# Patient Record
Sex: MALE | Race: ASIAN | NOT HISPANIC OR LATINO | Employment: FULL TIME | URBAN - METROPOLITAN AREA
[De-identification: names, ages, dates, MRNs, and addresses within clinical notes are randomized per-mention and may not be internally consistent; named-entity substitution may affect disease eponyms.]

---

## 2017-03-23 DIAGNOSIS — F51.01 PRIMARY INSOMNIA: ICD-10-CM

## 2017-03-23 NOTE — TELEPHONE ENCOUNTER
traZODone (DESYREL) 50 MG tablet  Last Written Prescription Date: 2/1/16  Last Fill Quantity: 90; # refills: 3  Last Office Visit with [facility], [facility] or [facility] prescribing provider:  2/1/16        Last PHQ-9 score on record= No flowsheet data found.    No results found for: AST  No results found for: ALT

## 2017-03-27 RX ORDER — TRAZODONE HYDROCHLORIDE 50 MG/1
TABLET, FILM COATED ORAL
Qty: 30 TABLET | Refills: 0 | Status: SHIPPED | OUTPATIENT
Start: 2017-03-27 | End: 2017-06-27

## 2017-03-27 NOTE — TELEPHONE ENCOUNTER
Medication is being filled for 1 time refill only due to:  Patient needs to be seen because it has been more than one year since last visit.   Please contact patient to schedule medication check.   Maame Moran RN  Triage Nurse

## 2017-04-03 ENCOUNTER — OFFICE VISIT (OUTPATIENT)
Dept: PEDIATRICS | Facility: CLINIC | Age: 37
End: 2017-04-03
Payer: COMMERCIAL

## 2017-04-03 VITALS
SYSTOLIC BLOOD PRESSURE: 102 MMHG | TEMPERATURE: 98.3 F | HEART RATE: 75 BPM | HEIGHT: 68 IN | BODY MASS INDEX: 23.64 KG/M2 | WEIGHT: 156 LBS | OXYGEN SATURATION: 97 % | DIASTOLIC BLOOD PRESSURE: 60 MMHG

## 2017-04-03 DIAGNOSIS — M79.2 NEUROPATHIC PAIN OF HAND, LEFT: ICD-10-CM

## 2017-04-03 DIAGNOSIS — F51.01 PRIMARY INSOMNIA: ICD-10-CM

## 2017-04-03 DIAGNOSIS — Z00.00 ENCOUNTER FOR ROUTINE ADULT HEALTH EXAMINATION WITHOUT ABNORMAL FINDINGS: Primary | ICD-10-CM

## 2017-04-03 DIAGNOSIS — S68.629S: ICD-10-CM

## 2017-04-03 DIAGNOSIS — N52.9 ERECTILE DYSFUNCTION, UNSPECIFIED ERECTILE DYSFUNCTION TYPE: ICD-10-CM

## 2017-04-03 PROCEDURE — 99213 OFFICE O/P EST LOW 20 MIN: CPT | Mod: 25 | Performed by: INTERNAL MEDICINE

## 2017-04-03 PROCEDURE — 99395 PREV VISIT EST AGE 18-39: CPT | Performed by: INTERNAL MEDICINE

## 2017-04-03 RX ORDER — SILDENAFIL CITRATE 20 MG/1
TABLET ORAL
Qty: 30 TABLET | Refills: 3 | Status: SHIPPED | OUTPATIENT
Start: 2017-04-03 | End: 2017-06-27

## 2017-04-03 RX ORDER — ZOLPIDEM TARTRATE 10 MG/1
TABLET ORAL
Qty: 90 TABLET | Refills: 1 | Status: SHIPPED | OUTPATIENT
Start: 2017-04-03 | End: 2017-06-27
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
## 2017-04-03 NOTE — NURSING NOTE
"Chief Complaint   Patient presents with     Physical       Initial /60 (Cuff Size: Adult Regular)  Pulse 75  Temp 98.3  F (36.8  C) (Oral)  Ht 5' 8\" (1.727 m)  Wt 156 lb (70.8 kg)  SpO2 97%  BMI 23.72 kg/m2 Estimated body mass index is 23.72 kg/(m^2) as calculated from the following:    Height as of this encounter: 5' 8\" (1.727 m).    Weight as of this encounter: 156 lb (70.8 kg).  Medication Reconciliation: isiah Cho CMA    "

## 2017-04-03 NOTE — MR AVS SNAPSHOT
After Visit Summary   4/3/2017    Edgardo Mustafa    MRN: [id number]           Patient Information     Date Of Birth          1980        Visit Information        Provider Department      4/3/2017 3:40 PM Mesfin Rosales MD [place] [facility]        Today's Diagnoses     Encounter for routine adult health examination without abnormal findings    -  1    Primary insomnia        Erectile dysfunction, unspecified erectile dysfunction type          Care Instructions      Preventive Health Recommendations  Male Ages 26 - 39    Yearly exam:             See your health care provider every year in order to  o   Review health changes.   o   Discuss preventive care.    o   Review your medicines if your doctor has prescribed any.    You should be tested each year for STDs (sexually transmitted diseases), if you re at risk.     After age 35, talk to your provider about cholesterol testing. If you are at risk for heart disease, have your cholesterol tested at least every 5 years.     If you are at risk for diabetes, you should have a diabetes test (fasting glucose).  Shots: Get a flu shot each year. Get a tetanus shot every 10 years.     Nutrition:    Eat at least 5 servings of fruits and vegetables daily.     Eat whole-grain bread, whole-wheat pasta and brown rice instead of white grains and rice.     Talk to your provider about Calcium and Vitamin D.     Lifestyle    Exercise for at least 150 minutes a week (30 minutes a day, 5 days a week). This will help you control your weight and prevent disease.     Limit alcohol to one drink per day.     No smoking.     Wear sunscreen to prevent skin cancer.     See your dentist every six months for an exam and cleaning.           Follow-ups after your visit        Who to contact     If you have questions or need follow up information about today's clinic visit or your schedule please contact [facility] [place] directly at [phone].  Normal or  "non-critical lab and imaging results will be communicated to you by MyChart, letter or phone within 4 business days after the clinic has received the results. If you do not hear from us within 7 days, please contact the clinic through [employer] or phone. If you have a critical or abnormal lab result, we will notify you by phone as soon as possible.  Submit refill requests through [employer] or call your pharmacy and they will forward the refill request to us. Please allow 3 business days for your refill to be completed.          Additional Information About Your Visit        Cytoo Information     [employer]too lets you send messages to your doctor, view your test results, renew your prescriptions, schedule appointments and more. To sign up, go to [email or web address] . Click on \"Log in\" on the left side of the screen, which will take you to the Welcome page. Then click on \"Sign up Now\" on the right side of the page.     You will be asked to enter the access code listed below, as well as some personal information. Please follow the directions to create your username and password.     Your access code is: [email or web address]  Expires: 2017  4:36 PM     Your access code will  in 90 days. If you need help or a new code, please call your [place] clinic or [phone].        Care EveryWhere ID     This is your Care EveryWhere ID. This could be used by other organizations to access your [place] medical records  [id number]        Your Vitals Were     Pulse Temperature Height Pulse Oximetry BMI (Body Mass Index)       75 98.3  F (36.8  C) (Oral) 5' 8\" (1.727 m) 97% 23.72 kg/m2        Blood Pressure from Last 3 Encounters:   17 102/60   16 (!) 138/92   16 120/82    Weight from Last 3 Encounters:   17 156 lb (70.8 kg)   16 155 lb (70.3 kg)   16 151 lb 6.4 oz (68.7 kg)              Today, you had the following     No orders found for display         Today's Medication Changes        "   These changes are accurate as of: 4/3/17  4:36 PM.  If you have any questions, ask your nurse or doctor.               Start taking these medicines.        Dose/Directions    sildenafil 20 MG tablet   Commonly known as:  REVATIO/VIAGRA   Used for:  Erectile dysfunction, unspecified erectile dysfunction type   Started by:  Mesfin Rosales MD        Take 1-3 tablets daily as needed 1-2 hours prior to activity   Quantity:  30 tablet   Refills:  3       zolpidem 10 MG tablet   Commonly known as:  AMBIEN   Used for:  Primary insomnia   Started by:  Mesfin Rosales MD        One half to one tab QHS prn   Quantity:  90 tablet   Refills:  1         Stop taking these medicines if you haven't already. Please contact your care team if you have questions.     oxyCODONE-acetaminophen 5-325 MG per tablet   Commonly known as:  PERCOCET   Stopped by:  Mesfin Rosales MD                Where to get your medicines      These medications were sent to [id number] - [place] - [address] [facility]  [address] [place] [address] [address]     Phone:  [phone]     sildenafil 20 MG tablet         Some of these will need a paper prescription and others can be bought over the counter.  Ask your nurse if you have questions.     Bring a paper prescription for each of these medications     zolpidem 10 MG tablet                Primary Care Provider Office Phone # Fax #    Mesfin Rosales -[phone]       [address] [place] [address]        Thank you!     Thank you for choosing [facility]  for your care. Our goal is always to provide you with excellent care. Hearing back from our patients is one way we can continue to improve our services. Please take a few minutes to complete the written survey that you may receive in the mail after your visit with us. Thank you!             Your Updated Medication List - Protect others  around you: Learn how to safely use, store and throw away your medicines at www.disposemymeds.org.          This list is accurate as of: 4/3/17  4:36 PM.  Always use your most recent med list.                   Brand Name Dispense Instructions for use    aluminum chloride 20 % external solution    DRYSOL    60 mL    To improve effect, cover area of application with plastic wrap,  and wash area in morning. As sweating improves, decrease use to 1-2 times weekly.       betamethasone dipropionate 0.05 % cream    DIPROSONE    45 g    Apply sparingly to affected area twice daily as needed.  Do not apply to face.       sildenafil 20 MG tablet    REVATIO/VIAGRA    30 tablet    Take 1-3 tablets daily as needed 1-2 hours prior to activity       traZODone 50 MG tablet    DESYREL    30 tablet    One half to one tab QHS prn       zolpidem 10 MG tablet    AMBIEN    90 tablet    One half to one tab QHS prn

## 2017-05-08 DIAGNOSIS — H57.052: Primary | ICD-10-CM

## 2017-05-08 RX ORDER — PILOCARPINE HYDROCHLORIDE 10 MG/ML
1 SOLUTION/ DROPS OPHTHALMIC 2 TIMES DAILY
Qty: 1 BOTTLE | Refills: 3 | Status: SHIPPED | OUTPATIENT
Start: 2017-05-08 | End: 2018-07-18

## 2017-05-08 NOTE — TELEPHONE ENCOUNTER
Patient calling to see if Dr. Rosales would prescribe his eye drops now that his eye physician has retired? Confirmed dosage. He gives verbal ok to speak to wife if necessary. Her number is: [phone] His number is [phone] but he will be in meetings most of the day.   Routing refill request to provider for review/approval because:  Medication is reported/historical  Jessie Claire RN

## 2017-06-08 ENCOUNTER — TELEPHONE (OUTPATIENT)
Dept: PEDIATRICS | Facility: CLINIC | Age: 37
End: 2017-06-08

## 2017-06-08 DIAGNOSIS — G47.00 INSOMNIA, UNSPECIFIED TYPE: Primary | ICD-10-CM

## 2017-06-08 NOTE — TELEPHONE ENCOUNTER
"Patient doesn't thinks the Ambien is working well.  Takes about one hour to fall asleep, and then doesn't feel like he sleeps well.  Feels like a \"zombie\" the next morning, and gets more and more tired as the day goes on.    Has tried Trazodone in the past and had similar symptoms.  Never had sleep study done, and would like to proceed with this now.  Order pending for sleep study, please sign if in agreement.  MITZI Tomlinson RN    "

## 2017-06-26 ENCOUNTER — OFFICE VISIT (OUTPATIENT)
Dept: SLEEP MEDICINE | Facility: CLINIC | Age: 37
End: 2017-06-26
Attending: INTERNAL MEDICINE
Payer: COMMERCIAL

## 2017-06-26 VITALS
BODY MASS INDEX: 22.73 KG/M2 | RESPIRATION RATE: 15 BRPM | HEART RATE: 77 BPM | WEIGHT: 150 LBS | DIASTOLIC BLOOD PRESSURE: 68 MMHG | OXYGEN SATURATION: 100 % | HEIGHT: 68 IN | SYSTOLIC BLOOD PRESSURE: 121 MMHG

## 2017-06-26 DIAGNOSIS — F51.04 PSYCHOPHYSIOLOGIC INSOMNIA: Primary | ICD-10-CM

## 2017-06-26 PROCEDURE — 99204 OFFICE O/P NEW MOD 45 MIN: CPT | Performed by: INTERNAL MEDICINE

## 2017-06-26 NOTE — PATIENT INSTRUCTIONS
"MY TREATMENT INFORMATION FOR INSOMNIA and SLEEP HYGIENE-  Edgardo Mustafa    MY CONTACT NUMBERS ARE:  [phone]  DOCTOR : Yosef Evans  SLEEP CENTER :  [facility]    BASELINE -- No insomnia  SHORT TERM INSOMNIA: Develops because of changes or events in life  CHRONIC INSOMNIA: Is maintained by habits and routines that develop over time.    THE SOLUTION: is to change those habits and routines to improve your sleep over long caroline.    Sleep is controlled by 2 factors:  1. Sleep drive: The longer you are awake, the more sleepy you feel. You get hungry for sleep as time passes.    2. Biological clock: This part of your brain tells you to be alert at some times of day and allows you to sleep at other times.    \"This best course: is to line up your sleep drive with your biological clock so you can fall and stay asleep more easily\".    Recommendations:  CBT-I: Stimulus control  Fill sleep logs.  Good sleep hygiene  Follow up dr. Rosales for evaluation for anxiety and the need for treatment.      General recommendations for sleep problems (Insomnia)  Allow 2-4 weeks to see results     Establish a regular sleep schedule   Go to bed at same time each night   Get up at same time each day   Avoid sleeping-in on Sunday morning   Cut down time in bed (if not asleep, get up)   Avoid trying to force yourself to sleep   Use your bed only for sleep and sex   Do not read or watch television in bed   Make the bedroom comfortable   Keep temperature in your bedroom comfortable   Keep bedroom quiet when sleeping   Consider ear plugs (silicon)   Keep bedroom dark enough   Use dark blinds or wear an eye mask if needed     Relax at bedtime   Relax each muscle group individually   Begin with your feet   Work toward your head     Deal with your worries before bedtime   Set aside a worry time earlier in the day for 15 minutes.    You may either list or journal about your concerns.    Thought stopping methods include:  1.  Acknowledge " the thought and let it go.  2.  Visual imagery.    Listen to relaxation tapes   Classical Music or Nature sounds   Imagine a tranquil scene (e.g. waterfall or beach)   Back Massage   Gentle 5-minute back rub prior to bedtime   Perform measures to make you tired at bedtime   Get regular exercise each day (6 hours before bedtime)   Take medications only as directed   Eat a light bedtime snack or warm drink   Warm milk   Warm herbal tea (non-caffeinated)     Stimulus Control   Do not lie awake for more than 15-20 minutes   Get out of bed and perform a quiet activity   Return to bed when sleepy   Repeat as many times per night as needed   No napping during day       Things to avoid   Do not exercise just before bedtime   No overstimulating activities just before bed   No competitive games before bedtime   No exciting television programs before bedtime   Avoid caffeine after lunchtime   Avoid chocolate   Avoid coffee, tea, or soda   Do not use alcohol to induce sleep (worsens Insomnia)   Do not take someone else's sleeping pills   Do not look at the clock when awakening   Do not turn on light when getting up to use bathroom     Read the book Say Good Night To Insomnia      Anxiety/rumination tools: Daytime use- https://www.inMarket.Dyn/            Evening use- https://www.youtube.com/watch?v=Kptd8vwAeZh      Good Sleep hygiene tips (American Academy of Sleep Medicine):  Maintain a regular sleep-wake routine    Go to bed at the same time. Wake up at the same time. Ideally, your schedule will remain the same (+/- 20 minutes) every night of the week.  Avoid naps if possible    Naps decrease the  Sleep Debt  that is so necessary for easy sleep onset.    Each of us needs a certain amount of sleep per 24-hour period. We need that amount, and we don t need more than that.    When we take naps, it decreases the amount of sleep that we need the next night - which may cause sleep fragmentation and difficulty initiating sleep, and  may lead to insomnia.  Don t stay in bed awake for more than 15-20 minutes. STIMULUS CONTROL.    If you find your mind racing, or worrying about not being able to sleep during the middle of the night, get out of bed, and sit in a chair in the dark. Do your mind racing in the chair until you are sleepy, then return to bed. No TV or internet or eat during these periods! That will just stimulate you more than desired.    If this happens several times during the night, that is OK. Just maintain your regular wake time, and try to avoid naps.  Don t watch TV, IPad, Phone or read in bed or eat in bed.    When you watch TV or read in bed or eat in bed, you associate the bed with wakefulness.    The bed is reserved for two things - sleep and hanky panky.  Drink caffeinated drinks with caution    The effects of caffeine may last for several hours after ingestion. Caffeine can fragment sleep, and cause difficulty initiating sleep. If you drink caffeine, use it only before noon.    Remember that soda and tea contain caffeine as well.  Avoid inappropriate substances that interfere with sleep    Cigarettes, alcohol, and over-the-counter medications may cause fragmented sleep.  Exercise regularly    Exercise before 2 pm every day. Exercise promotes continuous sleep.    Avoid rigorous exercise before bedtime. Rigorous exercise circulates endorphins into the body which may cause difficulty initiating sleep.   Have a quiet, comfortable bedroom    Set your bedroom thermostat at a comfortable temperature. Generally, a little cooler is better than a little warmer.    Turn off the TV and other extraneous noise that may disrupt sleep. Background  white noise  like a fan is OK.    If your pets awaken you, keep them outside the bedroom.    Your bedroom should be dark. Turn off bright lights.    Have a comfortable mattress.  If you are a  clock watcher  at night, hide the clock.      Have a comfortable pre-bedtime routine    A warm bath,  shower    Meditation, or quiet time    Wind-down 20 minutes prior of bedtime.            Your BMI is Body mass index is 22.81 kg/(m^2).  Weight management is a personal decision.  If you are interested in exploring weight loss strategies, the following discussion covers the approaches that may be successful. Body mass index (BMI) is one way to tell whether you are at a healthy weight, overweight, or obese. It measures your weight in relation to your height.  A BMI of 18.5 to 24.9 is in the healthy range. A person with a BMI of 25 to 29.9 is considered overweight, and someone with a BMI of 30 or greater is considered obese. More than two-thirds of American adults are considered overweight or obese.  Being overweight or obese increases the risk for further weight gain. Excess weight may lead to heart disease and diabetes.  Creating and following plans for healthy eating and physical activity may help you improve your health.  Weight control is part of healthy lifestyle and includes exercise, emotional health, and healthy eating habits. Careful eating habits lifelong are the mainstay of weight control. Though there are significant health benefits from weight loss, long-term weight loss with diet alone may be very difficult to achieve- studies show long-term success with dietary management in less than 10% of people. Attaining a healthy weight may be especially difficult to achieve in those with severe obesity. In some cases, medications, devices and surgical management might be considered.  What can you do?  If you are overweight or obese and are interested in methods for weight loss, you should discuss this with your provider.     Consider reducing daily calorie intake by 500 calories.     Keep a food journal.     Avoiding skipping meals, consider cutting portions instead.    Diet combined with exercise helps maintain muscle while optimizing fat loss. Strength training is particularly important for building and  maintaining muscle mass. Exercise helps reduce stress, increase energy, and improves fitness. Increasing exercise without diet control, however, may not burn enough calories to loose weight.       Start walking three days a week 10-20 minutes at a time    Work towards walking thirty minutes five days a week     Eventually, increase the speed of your walking for 1-2 minutes at time    In addition, we recommend that you review healthy lifestyles and methods for weight loss available through the National Institutes of Health patient information sites:  http://win.niddk.nih.gov/publications/index.htm    And look into health and wellness programs that may be available through your health insurance provider, employer, local community center, or nahid club.      Your blood pressure was checked while you were in clinic today.  Please read the guidelines below about what these numbers mean and what you should do about them.  Your systolic blood pressure is the top number.  This is the pressure when the heart is pumping.  Your diastolic blood pressure is the bottom number.  This is the pressure in between beats.  If your systolic blood pressure is less than 120 and your diastolic blood pressure is less than 80, then your blood pressure is normal. There is nothing more that you need to do about it  If your systolic blood pressure is 120-139 or your diastolic blood pressure is 80-89, your blood pressure may be higher than it should be.  You should have your blood pressure re-checked within a year by a primary care provider.  If your systolic blood pressure is 140 or greater or your diastolic blood pressure is 90 or greater, you may have high blood pressure.  High blood pressure is treatable, but if left untreated over time it can put you at risk for heart attack, stroke, or kidney failure.  You should have your blood pressure re-checked by a primary care provider within the next four weeks.
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
## 2017-06-26 NOTE — MR AVS SNAPSHOT
"              After Visit Summary   6/26/2017    Edgardo Mustafa    MRN: [id number]           Patient Information     Date Of Birth          1980        Visit Information        Provider Department      6/26/2017 9:00 AM Yosfe Evans MD [facility]        Today's Diagnoses     Insomnia, unspecified type          Care Instructions    MY TREATMENT INFORMATION FOR INSOMNIA and SLEEP HYGIENE-  Edgardo Mustafa    MY CONTACT NUMBERS ARE:  [phone]  DOCTOR : Yosef GOMEZ. Cristina  SLEEP CENTER :  [facility]    BASELINE -- No insomnia  SHORT TERM INSOMNIA: Develops because of changes or events in life  CHRONIC INSOMNIA: Is maintained by habits and routines that develop over time.    THE SOLUTION: is to change those habits and routines to improve your sleep over long caroline.    Sleep is controlled by 2 factors:  1. Sleep drive: The longer you are awake, the more sleepy you feel. You get hungry for sleep as time passes.    2. Biological clock: This part of your brain tells you to be alert at some times of day and allows you to sleep at other times.    \"This best course: is to line up your sleep drive with your biological clock so you can fall and stay asleep more easily\".    Recommendations:  CBT-I: Stimulus control  Fill sleep logs.  Good sleep hygiene  Follow up dr. Rosales for evaluation for anxiety and the need for treatment.      General recommendations for sleep problems (Insomnia)  Allow 2-4 weeks to see results     Establish a regular sleep schedule   Go to bed at same time each night   Get up at same time each day   Avoid sleeping-in on Sunday morning   Cut down time in bed (if not asleep, get up)   Avoid trying to force yourself to sleep   Use your bed only for sleep and sex   Do not read or watch television in bed   Make the bedroom comfortable   Keep temperature in your bedroom comfortable   Keep bedroom quiet when sleeping   Consider ear plugs (silicon)   Keep bedroom dark " enough   Use dark blinds or wear an eye mask if needed     Relax at bedtime   Relax each muscle group individually   Begin with your feet   Work toward your head     Deal with your worries before bedtime   Set aside a worry time earlier in the day for 15 minutes.    You may either list or journal about your concerns.    Thought stopping methods include:  1.  Acknowledge the thought and let it go.  2.  Visual imagery.    Listen to relaxation tapes   Classical Music or Nature sounds   Imagine a tranquil scene (e.g. waterfall or beach)   Back Massage   Gentle 5-minute back rub prior to bedtime   Perform measures to make you tired at bedtime   Get regular exercise each day (6 hours before bedtime)   Take medications only as directed   Eat a light bedtime snack or warm drink   Warm milk   Warm herbal tea (non-caffeinated)     Stimulus Control   Do not lie awake for more than 15-20 minutes   Get out of bed and perform a quiet activity   Return to bed when sleepy   Repeat as many times per night as needed   No napping during day       Things to avoid   Do not exercise just before bedtime   No overstimulating activities just before bed   No competitive games before bedtime   No exciting television programs before bedtime   Avoid caffeine after lunchtime   Avoid chocolate   Avoid coffee, tea, or soda   Do not use alcohol to induce sleep (worsens Insomnia)   Do not take someone else's sleeping pills   Do not look at the clock when awakening   Do not turn on light when getting up to use bathroom     Read the book Say Good Night To Insomnia      Anxiety/rumination tools: Daytime use- https://www.[employer].ABC Live/            Evening use- https://www.youtube.com/watch?v=Ttuz8guFkYr      Good Sleep hygiene tips (American Academy of Sleep Medicine):  Maintain a regular sleep-wake routine    Go to bed at the same time. Wake up at the same time. Ideally, your schedule will remain the same (+/- 20 minutes) every night of the week.  Avoid  naps if possible    Naps decrease the  Sleep Debt  that is so necessary for easy sleep onset.    Each of us needs a certain amount of sleep per 24-hour period. We need that amount, and we don t need more than that.    When we take naps, it decreases the amount of sleep that we need the next night - which may cause sleep fragmentation and difficulty initiating sleep, and may lead to insomnia.  Don t stay in bed awake for more than 15-20 minutes. STIMULUS CONTROL.    If you find your mind racing, or worrying about not being able to sleep during the middle of the night, get out of bed, and sit in a chair in the dark. Do your mind racing in the chair until you are sleepy, then return to bed. No TV or internet or eat during these periods! That will just stimulate you more than desired.    If this happens several times during the night, that is OK. Just maintain your regular wake time, and try to avoid naps.  Don t watch TV, IPad, Phone or read in bed or eat in bed.    When you watch TV or read in bed or eat in bed, you associate the bed with wakefulness.    The bed is reserved for two things - sleep and hanky panky.  Drink caffeinated drinks with caution    The effects of caffeine may last for several hours after ingestion. Caffeine can fragment sleep, and cause difficulty initiating sleep. If you drink caffeine, use it only before noon.    Remember that soda and tea contain caffeine as well.  Avoid inappropriate substances that interfere with sleep    Cigarettes, alcohol, and over-the-counter medications may cause fragmented sleep.  Exercise regularly    Exercise before 2 pm every day. Exercise promotes continuous sleep.    Avoid rigorous exercise before bedtime. Rigorous exercise circulates endorphins into the body which may cause difficulty initiating sleep.   Have a quiet, comfortable bedroom    Set your bedroom thermostat at a comfortable temperature. Generally, a little cooler is better than a little  warmer.    Turn off the TV and other extraneous noise that may disrupt sleep. Background  white noise  like a fan is OK.    If your pets awaken you, keep them outside the bedroom.    Your bedroom should be dark. Turn off bright lights.    Have a comfortable mattress.  If you are a  clock watcher  at night, hide the clock.      Have a comfortable pre-bedtime routine    A warm bath, shower    Meditation, or quiet time    Wind-down 20 minutes prior of bedtime.            Your BMI is Body mass index is 22.81 kg/(m^2).  Weight management is a personal decision.  If you are interested in exploring weight loss strategies, the following discussion covers the approaches that may be successful. Body mass index (BMI) is one way to tell whether you are at a healthy weight, overweight, or obese. It measures your weight in relation to your height.  A BMI of 18.5 to 24.9 is in the healthy range. A person with a BMI of 25 to 29.9 is considered overweight, and someone with a BMI of 30 or greater is considered obese. More than two-thirds of American adults are considered overweight or obese.  Being overweight or obese increases the risk for further weight gain. Excess weight may lead to heart disease and diabetes.  Creating and following plans for healthy eating and physical activity may help you improve your health.  Weight control is part of healthy lifestyle and includes exercise, emotional health, and healthy eating habits. Careful eating habits lifelong are the mainstay of weight control. Though there are significant health benefits from weight loss, long-term weight loss with diet alone may be very difficult to achieve- studies show long-term success with dietary management in less than 10% of people. Attaining a healthy weight may be especially difficult to achieve in those with severe obesity. In some cases, medications, devices and surgical management might be considered.  What can you do?  If you are overweight or obese and  are interested in methods for weight loss, you should discuss this with your provider.     Consider reducing daily calorie intake by 500 calories.     Keep a food journal.     Avoiding skipping meals, consider cutting portions instead.    Diet combined with exercise helps maintain muscle while optimizing fat loss. Strength training is particularly important for building and maintaining muscle mass. Exercise helps reduce stress, increase energy, and improves fitness. Increasing exercise without diet control, however, may not burn enough calories to loose weight.       Start walking three days a week 10-20 minutes at a time    Work towards walking thirty minutes five days a week     Eventually, increase the speed of your walking for 1-2 minutes at time    In addition, we recommend that you review healthy lifestyles and methods for weight loss available through the National Institutes of Health patient information sites:  http://win.niddk.nih.gov/publications/index.htm    And look into health and wellness programs that may be available through your health insurance provider, employer, local community center, or nahid club.      Your blood pressure was checked while you were in clinic today.  Please read the guidelines below about what these numbers mean and what you should do about them.  Your systolic blood pressure is the top number.  This is the pressure when the heart is pumping.  Your diastolic blood pressure is the bottom number.  This is the pressure in between beats.  If your systolic blood pressure is less than 120 and your diastolic blood pressure is less than 80, then your blood pressure is normal. There is nothing more that you need to do about it  If your systolic blood pressure is 120-139 or your diastolic blood pressure is 80-89, your blood pressure may be higher than it should be.  You should have your blood pressure re-checked within a year by a primary care provider.  If your systolic blood pressure  "is 140 or greater or your diastolic blood pressure is 90 or greater, you may have high blood pressure.  High blood pressure is treatable, but if left untreated over time it can put you at risk for heart attack, stroke, or kidney failure.  You should have your blood pressure re-checked by a primary care provider within the next four weeks.                Follow-ups after your visit        Who to contact     If you have questions or need follow up information about today's clinic visit or your schedule please contact [facility] directly at [phone].  Normal or non-critical lab and imaging results will be communicated to you by BrainSINShart, letter or phone within 4 business days after the clinic has received the results. If you do not hear from us within 7 days, please contact the clinic through Synthesiot or phone. If you have a critical or abnormal lab result, we will notify you by phone as soon as possible.  Submit refill requests through Babelgum or call your pharmacy and they will forward the refill request to us. Please allow 3 business days for your refill to be completed.          Additional Information About Your Visit        Babelgum Information     Babelgum lets you send messages to your doctor, view your test results, renew your prescriptions, schedule appointments and more. To sign up, go to [email or web address] . Click on \"Log in\" on the left side of the screen, which will take you to the Welcome page. Then click on \"Sign up Now\" on the right side of the page.     You will be asked to enter the access code listed below, as well as some personal information. Please follow the directions to create your username and password.     Your access code is: [email or web address]  Expires: 2017  4:36 PM     Your access code will  in 90 days. If you need help or a new code, please call your [place] clinic or [phone].        Care EveryWhere ID     This is your Care EveryWhere ID. This " "could be used by other organizations to access your [facility] medical records  [id number]        Your Vitals Were     Pulse Respirations Height Pulse Oximetry BMI (Body Mass Index)       77 15 1.727 m (5' 8\") 100% 22.81 kg/m2        Blood Pressure from Last 3 Encounters:   06/26/17 121/68   04/03/17 102/60   12/03/16 (!) 138/92    Weight from Last 3 Encounters:   06/26/17 68 kg (150 lb)   04/03/17 70.8 kg (156 lb)   12/03/16 70.3 kg (155 lb)              We Performed the Following     SLEEP EVALUATION & MANAGEMENT REFERRAL - ADULT        Primary Care Provider Office Phone # Fax #    Mesfin Rosales [phone]       [facility] [address] [place] [address]        Equal Access to Services     [facility] SHIVA : Hadii aad ku hadasho Soomaali, waaxda luqadaha, qaybta kaalmada adeegyada, joleen brennan hayharrison herrera . So [facility] [place] [phone].    ATENCIÓN: Si habla español, tiene a montemayor disposición servicios gratuitos de asistencia lingüística. Jani al [phone].    We comply with applicable federal civil rights laws and [place] laws. We do not discriminate on the basis of race, color, national origin, age, disability sex, sexual orientation or gender identity.            Thank you!     Thank you for choosing [place] [facility]  for your care. Our goal is always to provide you with excellent care. Hearing back from our patients is one way we can continue to improve our services. Please take a few minutes to complete the written survey that you may receive in the mail after your visit with us. Thank you!             Your Updated Medication List - Protect others around you: Learn how to safely use, store and throw away your medicines at www.disposemymeds.org.          This list is accurate as of: 6/26/17 10:04 AM.  Always use your most recent med list.                   Brand Name Dispense Instructions for use Diagnosis    aluminum chloride 20 % " external solution    DRYSOL    60 mL    To improve effect, cover area of application with plastic wrap,  and wash area in morning. As sweating improves, decrease use to 1-2 times weekly.    Generalized hyperhidrosis       betamethasone dipropionate 0.05 % cream    DIPROSONE    45 g    Apply sparingly to affected area twice daily as needed.  Do not apply to face.    Seborrheic dermatitis       OXYCODONE HCL PO      Take 5 mg by mouth daily as needed        pilocarpine 1 % ophthalmic solution    PILOCAR    1 Bottle    Place 1 drop Into the left eye 2 times daily    Adie's pupil, left       sildenafil 20 MG tablet    REVATIO/VIAGRA    30 tablet    Take 1-3 tablets daily as needed 1-2 hours prior to activity    Erectile dysfunction, unspecified erectile dysfunction type       traZODone 50 MG tablet    DESYREL    30 tablet    One half to one tab QHS prn    Primary insomnia       zolpidem 10 MG tablet    AMBIEN    90 tablet    One half to one tab QHS prn    Primary insomnia

## 2017-06-26 NOTE — NURSING NOTE
"Chief Complaint   Patient presents with     Consult     Insomnia, Ref by Dr Rosales,       Initial /68  Pulse 77  Resp 15  Ht 1.727 m (5' 8\")  Wt 68 kg (150 lb)  SpO2 100%  BMI 22.81 kg/m2 Estimated body mass index is 22.81 kg/(m^2) as calculated from the following:    Height as of this encounter: 1.727 m (5' 8\").    Weight as of this encounter: 68 kg (150 lb).  Medication Reconciliation: complete       Neck 38cm  15in  Ess 2      Morenita Zuñiga LPN/MA  "

## 2017-06-27 ENCOUNTER — OFFICE VISIT (OUTPATIENT)
Dept: PEDIATRICS | Facility: CLINIC | Age: 37
End: 2017-06-27
Payer: COMMERCIAL

## 2017-06-27 VITALS
HEART RATE: 60 BPM | SYSTOLIC BLOOD PRESSURE: 114 MMHG | WEIGHT: 150 LBS | TEMPERATURE: 98.1 F | BODY MASS INDEX: 22.81 KG/M2 | DIASTOLIC BLOOD PRESSURE: 70 MMHG

## 2017-06-27 DIAGNOSIS — G47.00 INSOMNIA, UNSPECIFIED TYPE: ICD-10-CM

## 2017-06-27 DIAGNOSIS — F41.9 ANXIETY: Primary | ICD-10-CM

## 2017-06-27 PROCEDURE — 99214 OFFICE O/P EST MOD 30 MIN: CPT | Performed by: INTERNAL MEDICINE

## 2017-06-27 RX ORDER — LORAZEPAM 0.5 MG/1
TABLET ORAL
Qty: 20 TABLET | Refills: 0 | Status: SHIPPED | OUTPATIENT
Start: 2017-06-27 | End: 2017-08-02

## 2017-06-27 RX ORDER — ESCITALOPRAM OXALATE 10 MG/1
TABLET ORAL
Qty: 30 TABLET | Refills: 1 | Status: SHIPPED | OUTPATIENT
Start: 2017-06-27 | End: 2017-08-02

## 2017-06-27 ASSESSMENT — PATIENT HEALTH QUESTIONNAIRE - PHQ9: 5. POOR APPETITE OR OVEREATING: SEVERAL DAYS

## 2017-06-27 ASSESSMENT — ANXIETY QUESTIONNAIRES
1. FEELING NERVOUS, ANXIOUS, OR ON EDGE: MORE THAN HALF THE DAYS
GAD7 TOTAL SCORE: 8
5. BEING SO RESTLESS THAT IT IS HARD TO SIT STILL: SEVERAL DAYS
6. BECOMING EASILY ANNOYED OR IRRITABLE: SEVERAL DAYS
2. NOT BEING ABLE TO STOP OR CONTROL WORRYING: SEVERAL DAYS
7. FEELING AFRAID AS IF SOMETHING AWFUL MIGHT HAPPEN: NOT AT ALL
3. WORRYING TOO MUCH ABOUT DIFFERENT THINGS: MORE THAN HALF THE DAYS
IF YOU CHECKED OFF ANY PROBLEMS ON THIS QUESTIONNAIRE, HOW DIFFICULT HAVE THESE PROBLEMS MADE IT FOR YOU TO DO YOUR WORK, TAKE CARE OF THINGS AT HOME, OR GET ALONG WITH OTHER PEOPLE: SOMEWHAT DIFFICULT

## 2017-06-27 NOTE — PATIENT INSTRUCTIONS
INSTRUCTIONS FOR TODAY:     stop ambien.    start lexapro   lorazepam as needed for insomnia   follow-up visit in 4-6 weeks    Dr Rosales

## 2017-06-27 NOTE — PROGRESS NOTES
"  SUBJECTIVE:                                                    Edgardo Mustafa is a 36 year old male who presents to clinic today for the following health issues:    Anxiety, poor sleep      Duration: on going problem    Description:  Depression: no  Anxiety: YES  Panic attacks: possible     Accompanying signs and symptoms: see PHQ-9 and MARU scores    History (similar episodes/previous evaluation):   36-year-old [employer] presents today for evaluation of ongoing sleep difficulty and concerns regarding anxiety. Patient has previously used Ambien for sleep. Complains of residual somnolence during the daytime after taking Ambien, interested in stopping the medication. Significant ongoing problems with insomnia. Recently referred back to primary care by sleep center regarding possible anxiety. Patient is unclear whether he feels anxious but does state his \"mind keeps running\" at night when he tries to sleep. 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 Does have difficult memories related to combat but has never been evaluated for PTSD. Does have a prior history of anxiety and had previously taken Paxil with some improvement. He is a bit concerned regarding the stigma around depression and anxiety /reluctant regarding medication today.    Precipitating or alleviating factors: None    Therapies tried and outcome: none      Patient Active Problem List   Diagnosis     Primary insomnia     Generalized hyperhidrosis     Adie's pupil, left     Partial traumatic transphalangeal amputation of finger, sequela (H)     Neuropathic pain of hand, left     Past Surgical History:   Procedure Laterality Date     SURGICAL PATHOLOGY EXAM      repair deviated septum       Social History   Substance Use Topics     Smoking status: Never Smoker     Smokeless tobacco: Never Used     Alcohol use Yes      Comment: rare     Family History   Problem Relation Age of Onset "     DIABETES No family hx of      Coronary Artery Disease No family hx of      Cancer - colorectal No family hx of      Prostate Cancer No family hx of      Hypertension Mother          Current Outpatient Prescriptions   Medication Sig Dispense Refill     escitalopram (LEXAPRO) 10 MG tablet One half tab daily for 2 weeks then increase to 1 tab daily 30 tablet 1     LORazepam (ATIVAN) 0.5 MG tablet 1 tab QHS prn insomnia 20 tablet 0     pilocarpine (PILOCAR) 1 % ophthalmic solution Place 1 drop Into the left eye 2 times daily 1 Bottle 3     aluminum chloride (DRYSOL) 20 % external solution To improve effect, cover area of application with plastic wrap,  and wash area in morning. As sweating improves, decrease use to 1-2 times weekly. 60 mL 6     betamethasone dipropionate (DIPROSONE) 0.05 % cream Apply sparingly to affected area twice daily as needed.  Do not apply to face. 45 g 2       ROS: The following systems have been completely reviewed and are negative except as noted in the HPI: CONSTITUTIONAL, CARDIOVASCULAR, PULMONARY, GASTROINTESTINAL, PSYCHIATRIC.     OBJECTIVE:                                                    /70 (Cuff Size: Adult Regular)  Pulse 60  Temp 98.1  F (36.7  C) (Oral)  Wt 150 lb (68 kg)  BMI 22.81 kg/m2 Body mass index is 22.81 kg/(m^2).  GENERAL:  alert,  no distress  Psychiatric: Normal affect  NECK: no tenderness, no adenopathy thyroid exam normal  RESP: lungs clear to auscultation - no rales, no rhonchi, no wheezes  CV: regular rates and rhythm, normal S1 S2, no S3 or S4 and no murmur, no click or rub   MS: extremities- no edema     ASSESSMENT/PLAN:                                                        ICD-10-CM    1. Anxiety F41.9 escitalopram (LEXAPRO) 10 MG tablet      Anxiety, possibly generalized anxiety disorder. May also be a component of PTSD related to  service. Recommended trial of Lexapro-side effects reviewed. Return for clinic follow-up 4-6 weeks or call  sooner if side effects become a problem.      2. Insomnia, unspecified type G47.00 LORazepam (ATIVAN) 0.5 MG tablet     Suspect insomnia will improve with anxiety management. Limited amount of lorazepam to use as needed-side effects reviewed. Instructed to discontinue Ambien.        25 minutes spent face-to-face with patient today with greater than 50% of that time spent in counseling and coordination of care regarding the issues above.     Mesfin Rosales MD  [facility]

## 2017-06-27 NOTE — NURSING NOTE
"Chief Complaint   Patient presents with     Anxiety       Initial /70 (Cuff Size: Adult Regular)  Pulse 60  Temp 98.1  F (36.7  C) (Oral)  Wt 150 lb (68 kg)  BMI 22.81 kg/m2 Estimated body mass index is 22.81 kg/(m^2) as calculated from the following:    Height as of 6/26/17: 5' 8\" (1.727 m).    Weight as of this encounter: 150 lb (68 kg).  Medication Reconciliation: isiah Cho CMA    "

## 2017-06-28 ASSESSMENT — ANXIETY QUESTIONNAIRES: GAD7 TOTAL SCORE: 8

## 2017-06-28 ASSESSMENT — PATIENT HEALTH QUESTIONNAIRE - PHQ9: SUM OF ALL RESPONSES TO PHQ QUESTIONS 1-9: 10

## 2017-08-02 ENCOUNTER — OFFICE VISIT (OUTPATIENT)
Dept: PEDIATRICS | Facility: CLINIC | Age: 37
End: 2017-08-02
Payer: COMMERCIAL

## 2017-08-02 VITALS
DIASTOLIC BLOOD PRESSURE: 70 MMHG | BODY MASS INDEX: 24.94 KG/M2 | HEART RATE: 64 BPM | WEIGHT: 164 LBS | TEMPERATURE: 98 F | SYSTOLIC BLOOD PRESSURE: 118 MMHG

## 2017-08-02 DIAGNOSIS — M54.9 UPPER BACK PAIN: ICD-10-CM

## 2017-08-02 DIAGNOSIS — F41.1 GAD (GENERALIZED ANXIETY DISORDER): Primary | ICD-10-CM
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
## 2017-08-02 DIAGNOSIS — G47.00 INSOMNIA, UNSPECIFIED TYPE: ICD-10-CM

## 2017-08-02 PROCEDURE — 99214 OFFICE O/P EST MOD 30 MIN: CPT | Performed by: INTERNAL MEDICINE

## 2017-08-02 RX ORDER — LORAZEPAM 0.5 MG/1
TABLET ORAL
Qty: 20 TABLET | Refills: 0 | Status: SHIPPED | OUTPATIENT
Start: 2017-08-02 | End: 2017-10-17

## 2017-08-02 RX ORDER — BUSPIRONE HYDROCHLORIDE 5 MG/1
TABLET ORAL
Qty: 150 TABLET | Refills: 1 | Status: SHIPPED | OUTPATIENT
Start: 2017-08-02 | End: 2017-08-14

## 2017-08-02 NOTE — NURSING NOTE
"Chief Complaint   Patient presents with     Anxiety       Initial /70 (Cuff Size: Adult Regular)  Pulse 64  Temp 98  F (36.7  C) (Oral)  Wt 164 lb (74.4 kg)  BMI 24.94 kg/m2 Estimated body mass index is 24.94 kg/(m^2) as calculated from the following:    Height as of 6/26/17: 5' 8\" (1.727 m).    Weight as of this encounter: 164 lb (74.4 kg).  Medication Reconciliation: isiah Cho CMA    "

## 2017-08-02 NOTE — PATIENT INSTRUCTIONS
INSTRUCTIONS FOR TODAY:     stop lexapro   start buspar and return for follow-up after 5-6 weeks   lorazepam as needed for sleep     Dr Rosales

## 2017-08-02 NOTE — MR AVS SNAPSHOT
"              After Visit Summary   8/2/2017    Edgardo Mustafa    MRN: [id number]           Patient Information     Date Of Birth          1980        Visit Information        Provider Department      8/2/2017 8:20 AM Mesfin Rosales MD [facility]        Today's Diagnoses     MARU (generalized anxiety disorder)    -  1    Insomnia, unspecified type          Care Instructions    INSTRUCTIONS FOR TODAY:     stop lexapro   start buspar and return for follow-up after 5-6 weeks   lorazepam as needed for sleep     Dr Rosales            Follow-ups after your visit        Your next 10 appointments already scheduled     Aug 22, 2017  4:00 PM CDT   Return Sleep Patient with Yosef Evans MD   [facility] ([facility] [place]    [address]  [place] [address] [phone]              Who to contact     If you have questions or need follow up information about today's clinic visit or your schedule please contact [facility] directly at [phone].  Normal or non-critical lab and imaging results will be communicated to you by MyChart, letter or phone within 4 business days after the clinic has received the results. If you do not hear from us within 7 days, please contact the clinic through [employer] or phone. If you have a critical or abnormal lab result, we will notify you by phone as soon as possible.  Submit refill requests through [employer] or call your pharmacy and they will forward the refill request to us. Please allow 3 business days for your refill to be completed.          Additional Information About Your Visit        [facility] Information     [employer] lets you send messages to your doctor, view your test results, renew your prescriptions, schedule appointments and more. To sign up, go to [email or web address] . Click on \"Log in\" on the left side of the screen, which will take you to the Welcome page. Then click " "on \"Sign up Now\" on the right side of the page.     You will be asked to enter the access code listed below, as well as some personal information. Please follow the directions to create your username and password.     Your access code is: [email or web address]  Expires: 10/31/2017  8:48 AM     Your access code will  in 90 days. If you need help or a new code, please call your [place] clinic or [phone].        Care EveryWhere ID     This is your Care EveryWhere ID. This could be used by other organizations to access your [place] medical records  [id number]        Your Vitals Were     Pulse Temperature BMI (Body Mass Index)             64 98  F (36.7  C) (Oral) 24.94 kg/m2          Blood Pressure from Last 3 Encounters:   17 118/70   17 114/70   17 121/68    Weight from Last 3 Encounters:   17 164 lb (74.4 kg)   17 150 lb (68 kg)   17 150 lb (68 kg)              Today, you had the following     No orders found for display         Today's Medication Changes          These changes are accurate as of: 17  8:48 AM.  If you have any questions, ask your nurse or doctor.               Start taking these medicines.        Dose/Directions    busPIRone 5 MG tablet   Commonly known as:  BUSPAR   Used for:  MARU (generalized anxiety disorder)   Started by:  Mesfin Rosales MD        Start at 5 mg twice daily for 3 days, then 7.5 mg (1.5 tabs) twice daily for 3 days, then 10 mg (2 tabs) twice daily for 3 days, then 12.5 mg (2.5 tabs) twice daily for 3 days, then 15 mg (3 tabs) twice daily and stay at that dose   Quantity:  150 tablet   Refills:  1         Stop taking these medicines if you haven't already. Please contact your care team if you have questions.     escitalopram 10 MG tablet   Commonly known as:  LEXAPRO   Stopped by:  Mesfin Rosales MD                Where to get your medicines      Some of these will need a paper prescription and others can be bought over the " counter.  Ask your nurse if you have questions.     Bring a paper prescription for each of these medications     busPIRone 5 MG tablet    LORazepam 0.5 MG tablet                Primary Care Provider Office Phone # Fax #    Mesfin Rosales [phone]       [facility] [address] [place] [address]        Equal Access to Services     [facility]: Hadii aad ku hadasho Soomaali, waaxda luqadaha, qaybta kaalmada adeegyada, waxay idiin hayaan adeeg khshankristy lajose . So [facility] [phone].    ATENCIÓN: Si habla español, tiene a montemayor disposición servicios gratuitos de asistencia lingüística. Llame al [phone].    We comply with applicable federal civil rights laws and [place] laws. We do not discriminate on the basis of race, color, national origin, age, disability sex, sexual orientation or gender identity.            Thank you!     Thank you for choosing [facility]  for your care. Our goal is always to provide you with excellent care. Hearing back from our patients is one way we can continue to improve our services. Please take a few minutes to complete the written survey that you may receive in the mail after your visit with us. Thank you!             Your Updated Medication List - Protect others around you: Learn how to safely use, store and throw away your medicines at www.disposemymeds.org.          This list is accurate as of: 8/2/17  8:48 AM.  Always use your most recent med list.                   Brand Name Dispense Instructions for use Diagnosis    aluminum chloride 20 % external solution    DRYSOL    60 mL    To improve effect, cover area of application with plastic wrap,  and wash area in morning. As sweating improves, decrease use to 1-2 times weekly.    Generalized hyperhidrosis       betamethasone dipropionate 0.05 % cream    DIPROSONE    45 g    Apply sparingly to affected area twice daily as needed.  Do not apply to face.    Seborrheic  dermatitis       busPIRone 5 MG tablet    BUSPAR    150 tablet    Start at 5 mg twice daily for 3 days, then 7.5 mg (1.5 tabs) twice daily for 3 days, then 10 mg (2 tabs) twice daily for 3 days, then 12.5 mg (2.5 tabs) twice daily for 3 days, then 15 mg (3 tabs) twice daily and stay at that dose    MARU (generalized anxiety disorder)       LORazepam 0.5 MG tablet    ATIVAN    20 tablet    1 tab QHS prn insomnia    Insomnia, unspecified type       pilocarpine 1 % ophthalmic solution    PILOCAR    1 Bottle    Place 1 drop Into the left eye 2 times daily    Gregg's pupil, left

## 2017-08-02 NOTE — PROGRESS NOTES
SUBJECTIVE:                                                    Edgardo Mustafa is a 36 year old male who presents to clinic today for the following health issues:    MARU Follow-Up    Status since last visit: Follow-up Lexapro.  Anxiety has improved.  However patient complains of loose stool and recent weight gain also some persistent disruption in sleep pattern.  Has prior history of insomnia.  Continues to have a true panic attacks    Other associated symptoms:None    Complicating factors:   Significant life event: No   Current substance abuse: None  Depression symptoms: No  MARU-7 SCORE 6/27/2017   Total Score 8     Insomnia, unspecified type     Chronic insomnia, has tried lorazepam since most  Recent visit with some improvement.    Did not tolerate Ambien    Upper back pain    Additional concerns regarding pain across upper back, has had intermittent problems with upper back pain for more than 10 years.  Symptoms have been more of a problem over the past few weeks.  Denies numbness paresthesias or radicular symptoms.  Denies recent injury.    Insect bite, initial encounter     Questions about a mosquito bite left axilla-region is pruritic.      Amount of exercise or physical activity: 2-3 days/week for an average of 45-60 minutes    Problems taking medications regularly: No    Medication side effects: none  Diet: regular (no restrictions)      Patient Active Problem List   Diagnosis     Primary insomnia     Generalized hyperhidrosis     Adie's pupil, left     Partial traumatic transphalangeal amputation of finger, sequela (H)     Neuropathic pain of hand, left     MARU (generalized anxiety disorder)     Past Surgical History:   Procedure Laterality Date     SURGICAL PATHOLOGY EXAM      repair deviated septum       Social History   Substance Use Topics     Smoking status: Never Smoker     Smokeless tobacco: Never Used     Alcohol use Yes      Comment: rare     Family History   Problem Relation Age of Onset      DIABETES No family hx of      Coronary Artery Disease No family hx of      Cancer - colorectal No family hx of      Prostate Cancer No family hx of      Hypertension Mother          Current Outpatient Prescriptions   Medication Sig Dispense Refill     LORazepam (ATIVAN) 0.5 MG tablet 1 tab QHS prn insomnia 20 tablet 0     lexapro        pilocarpine (PILOCAR) 1 % ophthalmic solution Place 1 drop Into the left eye 2 times daily 1 Bottle 3     aluminum chloride (DRYSOL) 20 % external solution To improve effect, cover area of application with plastic wrap,  and wash area in morning. As sweating improves, decrease use to 1-2 times weekly. 60 mL 6     betamethasone dipropionate (DIPROSONE) 0.05 % cream Apply sparingly to affected area twice daily as needed.  Do not apply to face. 45 g 2       ROS: The following systems have been completely reviewed and are negative except as noted in the HPI: CONSTITUTIONAL, CARDIOVASCULAR, PULMONARY, NEUROLOGIC    OBJECTIVE:                                                    /70 (Cuff Size: Adult Regular)  Pulse 64  Temp 98  F (36.7  C) (Oral)  Wt 164 lb (74.4 kg)  BMI 24.94 kg/m2 Body mass index is 24.94 kg/(m^2).  GENERAL:  alert,  no distress\  Psych: normal affect  NECK: no tenderness, no adenopathy  RESP: lungs clear to auscultation - no rales, no rhonchi, no wheezes  CV: regular rates and rhythm, normal S1 S2  Back:   Tenderness to palpation over  Thoracic paraspinous muscles without midline thoracic spinous tenderness.  Motor exam upper and lower extremities, DTRs normal  MS: extremities- no edema  Derm: 2cm  erythematous well-demarcated rash left axilla-nontender without fluctuance     ASSESSMENT/PLAN:                                                        ICD-10-CM    1. MARU (generalized anxiety disorder) F41.1 busPIRone (BUSPAR) 5 MG tablet       Discontinue Lexapro secondary to side effects.  Start trial of buspirone-side effects reviewed       Follow-up 5-6  weeks     2. Insomnia, unspecified type G47.00 LORazepam (ATIVAN) 0.5 MG tablet       Chronic insomnia, MARU contributing to ongoing issues with  Insomnia.  Continue lorazepam as needed/ short-term use     3. Upper back pain M54.9 [facility] PT, HAND, AND CHIROPRACTIC REFERRAL        Chronic intermittent upper back pain-recommended course of physical therapy     4. Insect bite, initial encounter W57.XXXA  no evidence of secondary infection, symptomatic cares/follow-up prn        Mesfin Rosales MD  [facility] [place]

## 2017-08-14 ENCOUNTER — TELEPHONE (OUTPATIENT)
Dept: PEDIATRICS | Facility: CLINIC | Age: 37
End: 2017-08-14

## 2017-08-14 NOTE — TELEPHONE ENCOUNTER
"Patient calling because he is experiencing side effects from Buspar.  Initially noted dizziness.  As he increased the dose, started feeling ill-like extreme dehydration (extremely dizzy and nauseous).  Also feeling angry.  Sunday decreased the dose again, and symptoms were not as extreme.  Is getting more \"ups and downs with everyday emotions.\"  Patient was at 12.5 mg, and then yesterday decreased to 5 mg.  Today tried 7.5 mg.  Patient states that Dr. Rosales thought this may help him sleep, but it hasn't helped with sleep issues either.  Patient asking if there may be an alternative medication?  MITZI Tomlinson RN    "

## 2017-08-14 NOTE — TELEPHONE ENCOUNTER
LM with this recommendation.  Advised to call with any other questions or concerns.  MITZI Tomlinson RN

## 2017-08-14 NOTE — TELEPHONE ENCOUNTER
Need more information--please call.  pt can stop buspar   Please recommend telephone visit to discuss other options to manage his anxiety  (consider zoloft, paxil)

## 2017-08-17 ENCOUNTER — VIRTUAL VISIT (OUTPATIENT)
Dept: PEDIATRICS | Facility: CLINIC | Age: 37
End: 2017-08-17
Payer: COMMERCIAL

## 2017-08-17 DIAGNOSIS — F51.01 PRIMARY INSOMNIA: ICD-10-CM

## 2017-08-17 DIAGNOSIS — F41.1 GAD (GENERALIZED ANXIETY DISORDER): Primary | ICD-10-CM

## 2017-08-17 PROCEDURE — 99442 ZZC PHYSICIAN TELEPHONE EVALUATION 11-20 MIN: CPT | Performed by: INTERNAL MEDICINE

## 2017-08-17 RX ORDER — FLUOXETINE 10 MG/1
10 CAPSULE ORAL DAILY
Qty: 30 CAPSULE | Refills: 1 | Status: SHIPPED | OUTPATIENT
Start: 2017-08-17 | End: 2017-10-17

## 2017-08-17 NOTE — PROGRESS NOTES
"SUBJECTIVE:  Edgardo Mustafa is a 36 year old who presents for telephone encounter.  recent nursing notes:    \"...Patient calling because he is experiencing side effects from Buspar.  Initially noted dizziness.  As he increased the dose, started feeling ill- (extremely dizzy and nauseous).  Also feeling angry.  Sunday decreased the dose again, and symptoms were not as extreme.  Is getting more \"ups and downs with everyday emotions.\"  Patient states that Dr. Rosales thought this may help him sleep, but it hasn't helped with sleep issues either...\"    Also resumed ambien for sleep which has helped  Doesn't think ativan is helpful.   Anxiety persists  lexapro stopped due to side effects.    OBJECTIVE:  Deferred    ASSESSMENT:      ICD-10-CM    1. MARU (generalized anxiety disorder) F41.1 FLUoxetine (PROZAC) 10 MG capsule      D/c buspar.  Start fluoxetine--Medication side effects discussed. rtc 4-6 weeks for follow-up\     2. Primary insomnia F51.01 Resume ambien as needed      Mesfin Rosales MD   15 minutes spent with patient by phone today with greater than 50% of that time spent in counseling and coordination of care regarding the issues above.   "

## 2017-08-17 NOTE — MR AVS SNAPSHOT
"              After Visit Summary   8/17/2017    Edagrdo Mustafa    MRN: [id number]           Patient Information     Date Of Birth          1980        Visit Information        Provider Department      8/17/2017 9:20 AM Mesfin Rosales MD [facility]        Today's Diagnoses     MARU (generalized anxiety disorder)    -  1    Primary insomnia           Follow-ups after your visit        Follow-up notes from your care team     Return in about 4 weeks (around 9/14/2017).      Your next 10 appointments already scheduled     Aug 22, 2017  4:00 PM CDT   Return Sleep Patient with Yosef Evans MD   [facility]    [address]  [place] [address] [phone]              Who to contact     If you have questions or need follow up information about today's clinic visit or your schedule please contact [facility] directly at [phone].  Normal or non-critical lab and imaging results will be communicated to you by MyChart, letter or phone within 4 business days after the clinic has received the results. If you do not hear from us within 7 days, please contact the clinic through MyPublisher[employer] or phone. If you have a critical or abnormal lab result, we will notify you by phone as soon as possible.  Submit refill requests through [employer] or call your pharmacy and they will forward the refill request to us. Please allow 3 business days for your refill to be completed.          Additional Information About Your Visit        [employer] Information     [employer] lets you send messages to your doctor, view your test results, renew your prescriptions, schedule appointments and more. To sign up, go to [email or web address] . Click on \"Log in\" on the left side of the screen, which will take you to the Welcome page. Then click on \"Sign up Now\" on the right side of the page.     You will be asked to enter the access " code listed below, as well as some personal information. Please follow the directions to create your username and password.     Your access code is: [email or web address]  Expires: 10/31/2017  8:48 AM     Your access code will  in 90 days. If you need help or a new code, please call your [place] clinic or [phone].        Care EveryWhere ID     This is your Care EveryWhere ID. This could be used by other organizations to access your [place] medical records  [id number]         Blood Pressure from Last 3 Encounters:   17 118/70   17 114/70   17 121/68    Weight from Last 3 Encounters:   17 164 lb (74.4 kg)   17 150 lb (68 kg)   17 150 lb (68 kg)              Today, you had the following     No orders found for display         Today's Medication Changes          These changes are accurate as of: 17 11:08 AM.  If you have any questions, ask your nurse or doctor.               Start taking these medicines.        Dose/Directions    FLUoxetine 10 MG capsule   Commonly known as:  PROzac   Used for:  MARU (generalized anxiety disorder)        Dose:  10 mg   Take 1 capsule (10 mg) by mouth daily   Quantity:  30 capsule   Refills:  1            Where to get your medicines      [facility] [address]  [place], [place] [address] AT [address]   [place], [place] [address]     Phone:  [phone]     FLUoxetine 10 MG capsule                Primary Care Provider Office Phone # Fax #    Mesfin Rosales -[phone]       [facility] [place] [address]        Equal Access to Services     [facility]: Hadii albino millan Soesteban, waaxda luqadaha, qaybta kaalmada enrike, joleen garcia. So [facility] [phone].    ATENCIÓN: Si habla español, tiene a montemayor disposición servicios gratuitos de asistencia lingüística. Llame al [phone].    We comply with applicable federal  civil rights laws and [place] laws. We do not discriminate on the basis of race, color, national origin, age, disability sex, sexual orientation or gender identity.            Thank you!     Thank you for choosing [facility] [place]  for your care. Our goal is always to provide you with excellent care. Hearing back from our patients is one way we can continue to improve our services. Please take a few minutes to complete the written survey that you may receive in the mail after your visit with us. Thank you!             Your Updated Medication List - Protect others around you: Learn how to safely use, store and throw away your medicines at www.disposemymeds.org.          This list is accurate as of: 8/17/17 11:08 AM.  Always use your most recent med list.                   Brand Name Dispense Instructions for use Diagnosis    aluminum chloride 20 % external solution    DRYSOL    60 mL    To improve effect, cover area of application with plastic wrap,  and wash area in morning. As sweating improves, decrease use to 1-2 times weekly.    Generalized hyperhidrosis       betamethasone dipropionate 0.05 % cream    DIPROSONE    45 g    Apply sparingly to affected area twice daily as needed.  Do not apply to face.    Seborrheic dermatitis       FLUoxetine 10 MG capsule    PROzac    30 capsule    Take 1 capsule (10 mg) by mouth daily    MARU (generalized anxiety disorder)       LORazepam 0.5 MG tablet    ATIVAN    20 tablet    1 tab QHS prn insomnia    Insomnia, unspecified type       pilocarpine 1 % ophthalmic solution    PILOCAR    1 Bottle    Place 1 drop Into the left eye 2 times daily    Treverie's pupil, left

## 2017-08-22 ENCOUNTER — OFFICE VISIT (OUTPATIENT)
Dept: SLEEP MEDICINE | Facility: CLINIC | Age: 37
End: 2017-08-22
Payer: COMMERCIAL

## 2017-08-22 VITALS
DIASTOLIC BLOOD PRESSURE: 74 MMHG | WEIGHT: 159 LBS | HEIGHT: 68 IN | BODY MASS INDEX: 24.1 KG/M2 | OXYGEN SATURATION: 100 % | HEART RATE: 60 BPM | SYSTOLIC BLOOD PRESSURE: 115 MMHG | RESPIRATION RATE: 10 BRPM

## 2017-08-22 DIAGNOSIS — F51.12 INSUFFICIENT SLEEP SYNDROME: ICD-10-CM

## 2017-08-22 DIAGNOSIS — F51.04 CHRONIC INSOMNIA: Primary | ICD-10-CM

## 2017-08-22 DIAGNOSIS — G47.21 DELAYED SLEEP PHASE SYNDROME: ICD-10-CM

## 2017-08-22 PROCEDURE — 99214 OFFICE O/P EST MOD 30 MIN: CPT | Performed by: INTERNAL MEDICINE
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
## 2017-08-22 NOTE — NURSING NOTE
"Chief Complaint   Patient presents with     RECHECK     f/u sleep logs       Initial /74  Pulse 60  Resp 10  Ht 1.727 m (5' 7.99\")  Wt 72.1 kg (159 lb)  SpO2 100%  BMI 24.18 kg/m2 Estimated body mass index is 24.18 kg/(m^2) as calculated from the following:    Height as of this encounter: 1.727 m (5' 7.99\").    Weight as of this encounter: 72.1 kg (159 lb).  Medication Reconciliation: complete     Zakiya Mcintyre CNA, Sleep Clinic Specialist  "

## 2017-08-22 NOTE — PATIENT INSTRUCTIONS
"MY TREATMENT INFORMATION FOR INSOMNIA and SLEEP HYGIENE-  Edgardo Mustafa    MY CONTACT NUMBERS ARE:  [phone]  DOCTOR : Yosef Evans  SLEEP CENTER :  [facility]    BASELINE -- No insomnia  SHORT TERM INSOMNIA: Develops because of changes or events in life  CHRONIC INSOMNIA: Is maintained by habits and routines that develop over time.    THE SOLUTION: is to change those habits and routines to improve your sleep over long caroline.    Sleep is controlled by 2 factors:  1. Sleep drive: The longer you are awake, the more sleepy you feel. You get hungry for sleep as time passes.    2. Biological clock: This part of your brain tells you to be alert at some times of day and allows you to sleep at other times.    \"This best course: is to line up your sleep drive with your biological clock so you can fall and stay asleep more easily\".    Recommendations:  Stimulus control  Fill sleep logs and actigraphy to be given for 2 weeks.  Good sleep hygiene      General recommendations for sleep problems (Insomnia)  Allow 2-4 weeks to see results     Establish a regular sleep schedule   Go to bed at same time each night   Get up at same time each day   Avoid sleeping-in on Sunday morning   Cut down time in bed (if not asleep, get up)   Avoid trying to force yourself to sleep   Use your bed only for sleep and sex   Do not read or watch television in bed   Make the bedroom comfortable   Keep temperature in your bedroom comfortable   Keep bedroom quiet when sleeping   Consider ear plugs (silicon)   Keep bedroom dark enough   Use dark blinds or wear an eye mask if needed     Relax at bedtime   Relax each muscle group individually   Begin with your feet   Work toward your head     Deal with your worries before bedtime   Set aside a worry time earlier in the day for 15 minutes.    You may either list or journal about your concerns.    Thought stopping methods include:  1.  Acknowledge the thought and let it go.  2.  Visual " imagery.    Listen to relaxation tapes   Classical Music or Nature sounds   Imagine a tranquil scene (e.g. waterfall or beach)   Back Massage   Gentle 5-minute back rub prior to bedtime   Perform measures to make you tired at bedtime   Get regular exercise each day (6 hours before bedtime)   Take medications only as directed   Eat a light bedtime snack or warm drink   Warm milk   Warm herbal tea (non-caffeinated)     Stimulus Control   Do not lie awake for more than 15-20 minutes   Get out of bed and perform a quiet activity   Return to bed when sleepy   Repeat as many times per night as needed   No napping during day       Things to avoid   Do not exercise just before bedtime   No overstimulating activities just before bed   No competitive games before bedtime   No exciting television programs before bedtime   Avoid caffeine after lunchtime   Avoid chocolate   Avoid coffee, tea, or soda   Do not use alcohol to induce sleep (worsens Insomnia)   Do not take someone else's sleeping pills   Do not look at the clock when awakening   Do not turn on light when getting up to use bathroom     Read the book Say Good Night To Insomnia      Anxiety/rumination tools: Daytime use- https://www.Inside.WOWash/            Evening use- https://www.youAria Analyticsube.com/watch?v=Qqpt7bzYoGu      Good Sleep hygiene tips (American Academy of Sleep Medicine):  Maintain a regular sleep-wake routine    Go to bed at the same time. Wake up at the same time. Ideally, your schedule will remain the same (+/- 20 minutes) every night of the week.  Avoid naps if possible    Naps decrease the  Sleep Debt  that is so necessary for easy sleep onset.    Each of us needs a certain amount of sleep per 24-hour period. We need that amount, and we don t need more than that.    When we take naps, it decreases the amount of sleep that we need the next night - which may cause sleep fragmentation and difficulty initiating sleep, and may lead to insomnia.  Don t stay in  bed awake for more than 15-20 minutes (Stimulus control)    If you find your mind racing, or worrying about not being able to sleep during the middle of the night, get out of bed, and sit in a chair in the dark. Do your mind racing in the chair until you are sleepy, then return to bed. No TV or internet or eat during these periods! That will just stimulate you more than desired.    If this happens several times during the night, that is OK. Just maintain your regular wake time, and try to avoid naps.  Don t watch TV, phone, computer, video games or read in bed or eat in bed.    When you watch TV, phone or read in bed or eat in bed, you associate the bed with wakefulness.    The bed is reserved for two things - sleep and hanky panky.  Drink caffeinated drinks with caution    The effects of caffeine may last for several hours after ingestion. Caffeine can fragment sleep, and cause difficulty initiating sleep. If you drink caffeine, use it only before noon.    Remember that soda and tea contain caffeine as well.  Avoid inappropriate substances that interfere with sleep    Cigarettes, alcohol, and over-the-counter medications may cause fragmented sleep.  Exercise regularly    Exercise promotes continuous sleep.    Avoid rigorous exercise before bedtime. Rigorous exercise circulates endorphins into the body which may cause difficulty initiating sleep.   Have a quiet, comfortable bedroom    Set your bedroom thermostat at a comfortable temperature. Generally, a little cooler is better than a little warmer.    Turn off the TV and other extraneous noise that may disrupt sleep. Background  white noise  like a fan is OK.    If your pets awaken you, keep them outside the bedroom.    Your bedroom should be dark. Turn off bright lights.    Have a comfortable mattress.  If you are a  clock watcher  at night, hide the clock.      Have a comfortable pre-bedtime routine    A warm bath, shower    Meditation, or quiet time    Wind-down  "20 minutes prior of bedtime.      Each of us has a built in tendency to find it easier to stay up late at night or get up early in the morning.  Being a  night owl  or  early bird  is a function of our internal body clock.  When you are forced to keep a sleep schedule that goes against your typical habits (because a job or other responsibilities) insomnia can be the result.     DELAYED SLEEP PHASE  What is it?   Delayed sleep phase disorder (DSP) is a circadian rhythm disorder. It consists of a typical sleep pattern that is \"delayed\" by two or more hours. This delay occurs when one s internal sleep clock (circadian rhythm) is shifted later at night and later in the morning. Once sleep occurs, the sleep is generally normal. But the delay leads to a pattern of sleep that is later than what is desired or what is considered socially acceptable. This pattern can be a problem when it interferes with work or social demands.  A person with DSP is likely to prefer late bedtimes and late wake-up times. When left to his or her own schedule, a person with DSP is likely to have a normal amount and quality of sleep. It simply occurs at a delayed time. One sign of this disorder is difficulty falling asleep until late at night. Another sign is having a hard time getting out of bed in the morning for work or school. These signs can make DSP look like insomnia. Daytime functioning can be severely impaired by DSP. It can lead to excessive sleepiness and fatigue. When able to sleep on their own schedules, people with DSP often stay up until they get tired and then sleep until they awaken late in the morning. In this case, they tend to have no complaint of difficulty falling to sleep or feeling poorly during the day.         Who gets it?   The exact rate of DSP is unknown in the general population. It is much more common in teens and young adults. From 7% to 16% of them may have it. DSP is likely to be found in 10% of people with a " "complaint of chronic insomnia. People who tend to be \"evening types\" or \"night owls\" are likely to develop DSP.     There is likely to be some genetic component. Some environmental factors may also be involved. A lack of exposure to morning sunlight may make it worse. Too much exposure to bright evening sunlight may also increase symptoms of DSP. A family history of DSP is common in about 40% of people with the disorder.     How do I know if I have it?   1. Is there a delay in your sleep pattern in relation to your desired sleep and wake times? Do you have trouble falling asleep at the desired time of night? Are you then unable to awaken at the desired or socially acceptable time?   2. When left to your own sleep schedule, do you have a normal duration and quality of sleep? Does this sleep occur in a stable, but delayed time period in relation to what is desired or socially acceptable?   3. Have you had this kind of stable but delayed sleep time for at least seven days?   If you answered  yes  to these questions, then you may have delayed sleep phase disorder.  It is also important to know if there is something else that is causing your sleep problems. They may be a result of one of the following:  Another sleep disorder   A medical condition   Medication use   A mental health disorder   Substance abuse            Do I need to see a sleep specialist?       LIGHT THERAPY  What is it?  Light therapy is a treatment used for people who suffer from circadian rhythm sleep disorders. Your body has an internal clock that tells it when it is time to be asleep and when it is time to be awake.     This clock is located in the brain just above an area where the nerves travel to the eyes. This area is called the SCN. Your clock controls the  circadian rhythms  in your body. These rhythms include body temperature, alertness and the daily cycle of many hormones.     The word  circadian  means to occur in a cycle of about 24 hours. " Circadian rhythms make you feel sleepy or alert at regular times every day. Some people have a circadian rhythm sleep disorder. This causes their natural sleep time to overlap with regular awake activities such as work or school.   Among other factors, your clock is  set  by your exposure to bright light such as sunlight. Exposure to bright light or  light therapy  is one method used to treat people with a circadian rhythm sleep disorder.     The goal for treating patients who have circadian rhythm problems is to combine a healthy sleep pattern with an internal clock that is set at the right time. This will allow them to enjoy the benefits of good sleep.     Light therapy can help someone  re-set  a clock that is off. Regular sleep patterns help to keep the clock set at the new time. Light therapy is only part of a treatment plan that should be guided by a doctor who is familiar with sleep disorders.   Light therapy is used to expose your eyes to intense but safe amounts of light for a specific and regular length of time. In many places, sunlight is not available at the proper time to be used as treatment.     Artificial light may be used to affect the body clock in the same way that sunlight does. New advances continue to be made in this field. Currently, products that are used for light therapy fit into four basic groups:   1. Light Box   This is the most common tool that is used in light therapy. The box houses several tubes that produce extremely bright light. It sits on top of a table or desk and plugs into the wall.     During a treatment session, you have to keep within a certain distance of the box. Usually, you will be about 18 to 24 inches away from it. It does not require you to look directly into the light. Instead, you simply face in the direction of the box.     You are able to do other activities during the session. Ideally, you would work on papers or read something that is in the area being lit up.  This will allow the light to be received by your eyes. Your body takes in this information and uses it to regulate the rhythms that control when you sleep and when you wake.   Earlier models of light boxes put out 2,500 to 5,000 lux of light. Lux is a measure of how much light falls on your eyes. These sessions could take two or three hours. Now, many boxes produce 10,000 lux of light. This allows sessions to take as little as 15 to 30 minutes.     More than one session may be needed each day. It depends upon your body, your need, and the strength of light being used. The key is to use the light at the right time of day and for the right amount of time. This is based upon the sleep disorder you want to correct.     New models are also safer, protecting you from harmful UV rays. Some models are now focusing on a specific bandwidth of light. Light boxes can be purchased in a variety of makes and models. Some are now being made much smaller so they are easier to take with you. General prices range from $200 - $500 per light box.   2. Desk Lamp   This serves the same purpose as a light box, but it is made to look like a normal lamp. It blends in better when used in an office setting.   3. Light Visor   This is a light source that is worn on your head and hangs over your eyes. It looks much like a tennis visor. It is made so that you can move around during sessions. The strength of visor lights also varies from 3,000 to 10,000 lux.   4. Karon Simulator     These lights gradually make a dark room brighter over a set period of time. This is meant to mimic the sunrise. Some people may find that this helps them wake up in the morning. Models may also slowly dim to copy a sunset.          Who gets it?  Bright light therapy is used for people who suffer from circadian rhythm disorders. The time of day when the light is used will depend upon the disorder it is meant to correct. These disorders include the following:     Your  blood pressure was checked while you were in clinic today.  Please read the guidelines below about what these numbers mean and what you should do about them.  Your systolic blood pressure is the top number.  This is the pressure when the heart is pumping.  Your diastolic blood pressure is the bottom number.  This is the pressure in between beats.  If your systolic blood pressure is less than 120 and your diastolic blood pressure is less than 80, then your blood pressure is normal. There is nothing more that you need to do about it  If your systolic blood pressure is 120-139 or your diastolic blood pressure is 80-89, your blood pressure may be higher than it should be.  You should have your blood pressure re-checked within a year by a primary care provider.  If your systolic blood pressure is 140 or greater or your diastolic blood pressure is 90 or greater, you may have high blood pressure.  High blood pressure is treatable, but if left untreated over time it can put you at risk for heart attack, stroke, or kidney failure.  You should have your blood pressure re-checked by a primary care provider within the next four weeks.    Your BMI is Body mass index is 24.18 kg/(m^2).  Weight management is a personal decision.  If you are interested in exploring weight loss strategies, the following discussion covers the approaches that may be successful. Body mass index (BMI) is one way to tell whether you are at a healthy weight, overweight, or obese. It measures your weight in relation to your height.  A BMI of 18.5 to 24.9 is in the healthy range. A person with a BMI of 25 to 29.9 is considered overweight, and someone with a BMI of 30 or greater is considered obese. More than two-thirds of American adults are considered overweight or obese.  Being overweight or obese increases the risk for further weight gain. Excess weight may lead to heart disease and diabetes.  Creating and following plans for healthy eating and physical  activity may help you improve your health.  Weight control is part of healthy lifestyle and includes exercise, emotional health, and healthy eating habits. Careful eating habits lifelong are the mainstay of weight control. Though there are significant health benefits from weight loss, long-term weight loss with diet alone may be very difficult to achieve- studies show long-term success with dietary management in less than 10% of people. Attaining a healthy weight may be especially difficult to achieve in those with severe obesity. In some cases, medications, devices and surgical management might be considered.  What can you do?  If you are overweight or obese and are interested in methods for weight loss, you should discuss this with your provider.     Consider reducing daily calorie intake by 500 calories.     Keep a food journal.     Avoiding skipping meals, consider cutting portions instead.    Diet combined with exercise helps maintain muscle while optimizing fat loss. Strength training is particularly important for building and maintaining muscle mass. Exercise helps reduce stress, increase energy, and improves fitness. Increasing exercise without diet control, however, may not burn enough calories to loose weight.       Start walking three days a week 10-20 minutes at a time    Work towards walking thirty minutes five days a week     Eventually, increase the speed of your walking for 1-2 minutes at time    In addition, we recommend that you review healthy lifestyles and methods for weight loss available through the National Institutes of Health patient information sites:  http://win.niddk.nih.gov/publications/index.htm    And look into health and wellness programs that may be available through your health insurance provider, employer, local community center, or nahid club.

## 2017-08-22 NOTE — MR AVS SNAPSHOT
"              After Visit Summary   8/22/2017    Edgardo Mustafa    MRN: [id number]           Patient Information     Date Of Birth          1980        Visit Information        Provider Department      8/22/2017 4:00 PM Yosef Evans MD [facility] [place]        Today's Diagnoses     Chronic insomnia    -  1    Delayed sleep phase syndrome          Care Instructions    MY TREATMENT INFORMATION FOR INSOMNIA and SLEEP HYGIENE-  Edgardo Mustafa    MY CONTACT NUMBERS ARE:  [phone]  DOCTOR : Yosef Evans  SLEEP CENTER :  [place]    BASELINE -- No insomnia  SHORT TERM INSOMNIA: Develops because of changes or events in life  CHRONIC INSOMNIA: Is maintained by habits and routines that develop over time.    THE SOLUTION: is to change those habits and routines to improve your sleep over long caroline.    Sleep is controlled by 2 factors:  1. Sleep drive: The longer you are awake, the more sleepy you feel. You get hungry for sleep as time passes.    2. Biological clock: This part of your brain tells you to be alert at some times of day and allows you to sleep at other times.    \"This best course: is to line up your sleep drive with your biological clock so you can fall and stay asleep more easily\".    Recommendations:  Stimulus control  Fill sleep logs and actigraphy to be given for 2 weeks.  Good sleep hygiene      General recommendations for sleep problems (Insomnia)  Allow 2-4 weeks to see results     Establish a regular sleep schedule   Go to bed at same time each night   Get up at same time each day   Avoid sleeping-in on Sunday morning   Cut down time in bed (if not asleep, get up)   Avoid trying to force yourself to sleep   Use your bed only for sleep and sex   Do not read or watch television in bed   Make the bedroom comfortable   Keep temperature in your bedroom comfortable   Keep bedroom quiet when sleeping   Consider ear plugs (silicon)   Keep bedroom dark enough   Use " dark blinds or wear an eye mask if needed     Relax at bedtime   Relax each muscle group individually   Begin with your feet   Work toward your head     Deal with your worries before bedtime   Set aside a worry time earlier in the day for 15 minutes.    You may either list or journal about your concerns.    Thought stopping methods include:  1.  Acknowledge the thought and let it go.  2.  Visual imagery.    Listen to relaxation tapes   Classical Music or Nature sounds   Imagine a tranquil scene (e.g. waterfall or beach)   Back Massage   Gentle 5-minute back rub prior to bedtime   Perform measures to make you tired at bedtime   Get regular exercise each day (6 hours before bedtime)   Take medications only as directed   Eat a light bedtime snack or warm drink   Warm milk   Warm herbal tea (non-caffeinated)     Stimulus Control   Do not lie awake for more than 15-20 minutes   Get out of bed and perform a quiet activity   Return to bed when sleepy   Repeat as many times per night as needed   No napping during day       Things to avoid   Do not exercise just before bedtime   No overstimulating activities just before bed   No competitive games before bedtime   No exciting television programs before bedtime   Avoid caffeine after lunchtime   Avoid chocolate   Avoid coffee, tea, or soda   Do not use alcohol to induce sleep (worsens Insomnia)   Do not take someone else's sleeping pills   Do not look at the clock when awakening   Do not turn on light when getting up to use bathroom     Read the book Say Good Night To Insomnia      Anxiety/rumination tools: Daytime use- https://www.[employer]/            Evening use- https://www.youtube.com/watch?v=Visi1waAkGa      Good Sleep hygiene tips (American Academy of Sleep Medicine):  Maintain a regular sleep-wake routine    Go to bed at the same time. Wake up at the same time. Ideally, your schedule will remain the same (+/- 20 minutes) every night of the week.  Avoid naps if  possible    Naps decrease the  Sleep Debt  that is so necessary for easy sleep onset.    Each of us needs a certain amount of sleep per 24-hour period. We need that amount, and we don t need more than that.    When we take naps, it decreases the amount of sleep that we need the next night - which may cause sleep fragmentation and difficulty initiating sleep, and may lead to insomnia.  Don t stay in bed awake for more than 15-20 minutes (Stimulus control)    If you find your mind racing, or worrying about not being able to sleep during the middle of the night, get out of bed, and sit in a chair in the dark. Do your mind racing in the chair until you are sleepy, then return to bed. No TV or internet or eat during these periods! That will just stimulate you more than desired.    If this happens several times during the night, that is OK. Just maintain your regular wake time, and try to avoid naps.  Don t watch TV, phone, computer, video games or read in bed or eat in bed.    When you watch TV, phone or read in bed or eat in bed, you associate the bed with wakefulness.    The bed is reserved for two things - sleep and hanky panky.  Drink caffeinated drinks with caution    The effects of caffeine may last for several hours after ingestion. Caffeine can fragment sleep, and cause difficulty initiating sleep. If you drink caffeine, use it only before noon.    Remember that soda and tea contain caffeine as well.  Avoid inappropriate substances that interfere with sleep    Cigarettes, alcohol, and over-the-counter medications may cause fragmented sleep.  Exercise regularly    Exercise promotes continuous sleep.    Avoid rigorous exercise before bedtime. Rigorous exercise circulates endorphins into the body which may cause difficulty initiating sleep.   Have a quiet, comfortable bedroom    Set your bedroom thermostat at a comfortable temperature. Generally, a little cooler is better than a little warmer.    Turn off the TV and  "other extraneous noise that may disrupt sleep. Background  white noise  like a fan is OK.    If your pets awaken you, keep them outside the bedroom.    Your bedroom should be dark. Turn off bright lights.    Have a comfortable mattress.  If you are a  clock watcher  at night, hide the clock.      Have a comfortable pre-bedtime routine    A warm bath, shower    Meditation, or quiet time    Wind-down 20 minutes prior of bedtime.      Each of us has a built in tendency to find it easier to stay up late at night or get up early in the morning.  Being a  night owl  or  early bird  is a function of our internal body clock.  When you are forced to keep a sleep schedule that goes against your typical habits (because a job or other responsibilities) insomnia can be the result.     DELAYED SLEEP PHASE  What is it?   Delayed sleep phase disorder (DSP) is a circadian rhythm disorder. It consists of a typical sleep pattern that is \"delayed\" by two or more hours. This delay occurs when one s internal sleep clock (circadian rhythm) is shifted later at night and later in the morning. Once sleep occurs, the sleep is generally normal. But the delay leads to a pattern of sleep that is later than what is desired or what is considered socially acceptable. This pattern can be a problem when it interferes with work or social demands.  A person with DSP is likely to prefer late bedtimes and late wake-up times. When left to his or her own schedule, a person with DSP is likely to have a normal amount and quality of sleep. It simply occurs at a delayed time. One sign of this disorder is difficulty falling asleep until late at night. Another sign is having a hard time getting out of bed in the morning for work or school. These signs can make DSP look like insomnia. Daytime functioning can be severely impaired by DSP. It can lead to excessive sleepiness and fatigue. When able to sleep on their own schedules, people with DSP often stay up until " "they get tired and then sleep until they awaken late in the morning. In this case, they tend to have no complaint of difficulty falling to sleep or feeling poorly during the day.         Who gets it?   The exact rate of DSP is unknown in the general population. It is much more common in teens and young adults. From 7% to 16% of them may have it. DSP is likely to be found in 10% of people with a complaint of chronic insomnia. People who tend to be \"evening types\" or \"night owls\" are likely to develop DSP.     There is likely to be some genetic component. Some environmental factors may also be involved. A lack of exposure to morning sunlight may make it worse. Too much exposure to bright evening sunlight may also increase symptoms of DSP. A family history of DSP is common in about 40% of people with the disorder.     How do I know if I have it?   1. Is there a delay in your sleep pattern in relation to your desired sleep and wake times? Do you have trouble falling asleep at the desired time of night? Are you then unable to awaken at the desired or socially acceptable time?   2. When left to your own sleep schedule, do you have a normal duration and quality of sleep? Does this sleep occur in a stable, but delayed time period in relation to what is desired or socially acceptable?   3. Have you had this kind of stable but delayed sleep time for at least seven days?   If you answered  yes  to these questions, then you may have delayed sleep phase disorder.  It is also important to know if there is something else that is causing your sleep problems. They may be a result of one of the following:  Another sleep disorder   A medical condition   Medication use   A mental health disorder   Substance abuse            Do I need to see a sleep specialist?       LIGHT THERAPY  What is it?  Light therapy is a treatment used for people who suffer from circadian rhythm sleep disorders. Your body has an internal clock that tells it " when it is time to be asleep and when it is time to be awake.     This clock is located in the brain just above an area where the nerves travel to the eyes. This area is called the SCN. Your clock controls the  circadian rhythms  in your body. These rhythms include body temperature, alertness and the daily cycle of many hormones.     The word  circadian  means to occur in a cycle of about 24 hours. Circadian rhythms make you feel sleepy or alert at regular times every day. Some people have a circadian rhythm sleep disorder. This causes their natural sleep time to overlap with regular awake activities such as work or school.   Among other factors, your clock is  set  by your exposure to bright light such as sunlight. Exposure to bright light or  light therapy  is one method used to treat people with a circadian rhythm sleep disorder.     The goal for treating patients who have circadian rhythm problems is to combine a healthy sleep pattern with an internal clock that is set at the right time. This will allow them to enjoy the benefits of good sleep.     Light therapy can help someone  re-set  a clock that is off. Regular sleep patterns help to keep the clock set at the new time. Light therapy is only part of a treatment plan that should be guided by a doctor who is familiar with sleep disorders.   Light therapy is used to expose your eyes to intense but safe amounts of light for a specific and regular length of time. In many places, sunlight is not available at the proper time to be used as treatment.     Artificial light may be used to affect the body clock in the same way that sunlight does. New advances continue to be made in this field. Currently, products that are used for light therapy fit into four basic groups:   1. Light Box   This is the most common tool that is used in light therapy. The box houses several tubes that produce extremely bright light. It sits on top of a table or desk and plugs into the wall.      During a treatment session, you have to keep within a certain distance of the box. Usually, you will be about 18 to 24 inches away from it. It does not require you to look directly into the light. Instead, you simply face in the direction of the box.     You are able to do other activities during the session. Ideally, you would work on papers or read something that is in the area being lit up. This will allow the light to be received by your eyes. Your body takes in this information and uses it to regulate the rhythms that control when you sleep and when you wake.   Earlier models of light boxes put out 2,500 to 5,000 lux of light. Lux is a measure of how much light falls on your eyes. These sessions could take two or three hours. Now, many boxes produce 10,000 lux of light. This allows sessions to take as little as 15 to 30 minutes.     More than one session may be needed each day. It depends upon your body, your need, and the strength of light being used. The key is to use the light at the right time of day and for the right amount of time. This is based upon the sleep disorder you want to correct.     New models are also safer, protecting you from harmful UV rays. Some models are now focusing on a specific bandwidth of light. Light boxes can be purchased in a variety of makes and models. Some are now being made much smaller so they are easier to take with you. General prices range from $200 - $500 per light box.   2. Desk Lamp   This serves the same purpose as a light box, but it is made to look like a normal lamp. It blends in better when used in an office setting.   3. Light Visor   This is a light source that is worn on your head and hangs over your eyes. It looks much like a tennis visor. It is made so that you can move around during sessions. The strength of visor lights also varies from 3,000 to 10,000 lux.   4. Karno Simulator     These lights gradually make a dark room brighter over a set period of  time. This is meant to mimic the sunrise. Some people may find that this helps them wake up in the morning. Models may also slowly dim to copy a sunset.          Who gets it?  Bright light therapy is used for people who suffer from circadian rhythm disorders. The time of day when the light is used will depend upon the disorder it is meant to correct. These disorders include the following:     Your blood pressure was checked while you were in clinic today.  Please read the guidelines below about what these numbers mean and what you should do about them.  Your systolic blood pressure is the top number.  This is the pressure when the heart is pumping.  Your diastolic blood pressure is the bottom number.  This is the pressure in between beats.  If your systolic blood pressure is less than 120 and your diastolic blood pressure is less than 80, then your blood pressure is normal. There is nothing more that you need to do about it  If your systolic blood pressure is 120-139 or your diastolic blood pressure is 80-89, your blood pressure may be higher than it should be.  You should have your blood pressure re-checked within a year by a primary care provider.  If your systolic blood pressure is 140 or greater or your diastolic blood pressure is 90 or greater, you may have high blood pressure.  High blood pressure is treatable, but if left untreated over time it can put you at risk for heart attack, stroke, or kidney failure.  You should have your blood pressure re-checked by a primary care provider within the next four weeks.    Your BMI is Body mass index is 24.18 kg/(m^2).  Weight management is a personal decision.  If you are interested in exploring weight loss strategies, the following discussion covers the approaches that may be successful. Body mass index (BMI) is one way to tell whether you are at a healthy weight, overweight, or obese. It measures your weight in relation to your height.  A BMI of 18.5 to 24.9 is in  the healthy range. A person with a BMI of 25 to 29.9 is considered overweight, and someone with a BMI of 30 or greater is considered obese. More than two-thirds of American adults are considered overweight or obese.  Being overweight or obese increases the risk for further weight gain. Excess weight may lead to heart disease and diabetes.  Creating and following plans for healthy eating and physical activity may help you improve your health.  Weight control is part of healthy lifestyle and includes exercise, emotional health, and healthy eating habits. Careful eating habits lifelong are the mainstay of weight control. Though there are significant health benefits from weight loss, long-term weight loss with diet alone may be very difficult to achieve- studies show long-term success with dietary management in less than 10% of people. Attaining a healthy weight may be especially difficult to achieve in those with severe obesity. In some cases, medications, devices and surgical management might be considered.  What can you do?  If you are overweight or obese and are interested in methods for weight loss, you should discuss this with your provider.     Consider reducing daily calorie intake by 500 calories.     Keep a food journal.     Avoiding skipping meals, consider cutting portions instead.    Diet combined with exercise helps maintain muscle while optimizing fat loss. Strength training is particularly important for building and maintaining muscle mass. Exercise helps reduce stress, increase energy, and improves fitness. Increasing exercise without diet control, however, may not burn enough calories to loose weight.       Start walking three days a week 10-20 minutes at a time    Work towards walking thirty minutes five days a week     Eventually, increase the speed of your walking for 1-2 minutes at time    In addition, we recommend that you review healthy lifestyles and methods for weight loss available through the  "National Institutes of Health patient information sites:  http://win.niddk.nih.gov/publications/index.htm    And look into health and wellness programs that may be available through your health insurance provider, employer, local community center, or nahid club.                Follow-ups after your visit        Future tests that were ordered for you today     Open Future Orders        Priority Expected Expires Ordered    Comprehensive Sleep Study Routine  2018            Who to contact     If you have questions or need follow up information about today's clinic visit or your schedule please contact [facility] directly at [phone].  Normal or non-critical lab and imaging results will be communicated to you by [employer], letter or phone within 4 business days after the clinic has received the results. If you do not hear from us within 7 days, please contact the clinic through [employer] or phone. If you have a critical or abnormal lab result, we will notify you by phone as soon as possible.  Submit refill requests through [employer] or call your pharmacy and they will forward the refill request to us. Please allow 3 business days for your refill to be completed.          Additional Information About Your Visit        MyChart Information     [employer] lets you send messages to your doctor, view your test results, renew your prescriptions, schedule appointments and more. To sign up, go to [email or web address] . Click on \"Log in\" on the left side of the screen, which will take you to the Welcome page. Then click on \"Sign up Now\" on the right side of the page.     You will be asked to enter the access code listed below, as well as some personal information. Please follow the directions to create your username and password.     Your access code is: [email or web address]  Expires: 10/31/2017  8:48 AM     Your access code will  in 90 days. If you need help or a new code, please call your " "[facility] or [phone].        Care EveryWhere ID     This is your Care EveryWhere ID. This could be used by other organizations to access your [place] medical records  [id number]        Your Vitals Were     Pulse Respirations Height Pulse Oximetry BMI (Body Mass Index)       60 10 1.727 m (5' 7.99\") 100% 24.18 kg/m2        Blood Pressure from Last 3 Encounters:   08/22/17 115/74   08/02/17 118/70   06/27/17 114/70    Weight from Last 3 Encounters:   08/22/17 72.1 kg (159 lb)   08/02/17 74.4 kg (164 lb)   06/27/17 68 kg (150 lb)               Primary Care Provider Office Phone # Fax #    Mesfin Rosales [phone] [place] [id number]        Equal Access to Services     [facility]: Hadii aad ku hadasho Soomaali, waaxda luqadaha, qaybta kaalmada adeegyada, joleen dickersonin hayharrison herrera . So [facility] [phone].    ATENCIÓN: Si habla español, tiene a montemayor disposición servicios gratuitos de asistencia lingüística. Angelinafady al [phone].    We comply with applicable federal civil rights laws and [place] laws. We do not discriminate on the basis of race, color, national origin, age, disability sex, sexual orientation or gender identity.            Thank you!     Thank you for choosing [facility]  for your care. Our goal is always to provide you with excellent care. Hearing back from our patients is one way we can continue to improve our services. Please take a few minutes to complete the written survey that you may receive in the mail after your visit with us. Thank you!             Your Updated Medication List - Protect others around you: Learn how to safely use, store and throw away your medicines at www.disposemymeds.org.          This list is accurate as of: 8/22/17  4:35 PM.  Always use your most recent med list.                   Brand Name Dispense Instructions for use Diagnosis    aluminum chloride 20 % external " solution    DRYSOL    60 mL    To improve effect, cover area of application with plastic wrap,  and wash area in morning. As sweating improves, decrease use to 1-2 times weekly.    Generalized hyperhidrosis       betamethasone dipropionate 0.05 % cream    DIPROSONE    45 g    Apply sparingly to affected area twice daily as needed.  Do not apply to face.    Seborrheic dermatitis       FLUoxetine 10 MG capsule    PROzac    30 capsule    Take 1 capsule (10 mg) by mouth daily    MARU (generalized anxiety disorder)       LORazepam 0.5 MG tablet    ATIVAN    20 tablet    1 tab QHS prn insomnia    Insomnia, unspecified type       pilocarpine 1 % ophthalmic solution    PILOCAR    1 Bottle    Place 1 drop Into the left eye 2 times daily    Gregg's pupil, left

## 2017-08-29 ENCOUNTER — OFFICE VISIT (OUTPATIENT)
Dept: SLEEP MEDICINE | Facility: CLINIC | Age: 37
End: 2017-08-29
Payer: COMMERCIAL

## 2017-08-29 DIAGNOSIS — G47.21 DELAYED SLEEP PHASE SYNDROME: Primary | ICD-10-CM

## 2017-08-29 NOTE — PROGRESS NOTES
Patient picked up actiwatch and sleep logs and was instructed on use. They showed understanding by demonstrating it back. HE will return for download and appointment on 9/14/17 @ 3:30P

## 2017-08-29 NOTE — MR AVS SNAPSHOT
"              After Visit Summary   8/29/2017    Edgardo Mustafa    MRN: [id number]           Patient Information     Date Of Birth          1980        Visit Information        Provider Department      8/29/2017 8:00 AM  SLEEP LAB [facility]        Today's Diagnoses     Delayed sleep phase syndrome    -  1       Follow-ups after your visit        Your next 10 appointments already scheduled     Sep 14, 2017  3:30 PM CDT   Return Sleep Patient with Yosef Evans MD   [facility] ([facility])    [address] [place] [id number] [phone]              Who to contact     If you have questions or need follow up information about today's clinic visit or your schedule please contact [facility] directly at [phone].  Normal or non-critical lab and imaging results will be communicated to you by MyChart, letter or phone within 4 business days after the clinic has received the results. If you do not hear from us within 7 days, please contact the clinic through MyChart or phone. If you have a critical or abnormal lab result, we will notify you by phone as soon as possible.  Submit refill requests through OpenWhere or call your pharmacy and they will forward the refill request to us. Please allow 3 business days for your refill to be completed.          Additional Information About Your Visit        MyChart Information     OpenWhere lets you send messages to your doctor, view your test results, renew your prescriptions, schedule appointments and more. To sign up, go to [email or web address] . Click on \"Log in\" on the left side of the screen, which will take you to the Welcome page. Then click on \"Sign up Now\" on the right side of the page.     You will be asked to enter the access code listed below, as well as some personal information. Please follow the directions to create your " username and password.     Your access code is: [email or web address]  Expires: 10/31/2017  8:48 AM     Your access code will  in 90 days. If you need help or a new code, please call your [place] clinic or [phone].        Care EveryWhere ID     This is your Care EveryWhere ID. This could be used by other organizations to access your [place] medical records  [id number]         Blood Pressure from Last 3 Encounters:   17 115/74   17 118/70   17 114/70    Weight from Last 3 Encounters:   17 72.1 kg (159 lb)   17 74.4 kg (164 lb)   17 68 kg (150 lb)              Today, you had the following     No orders found for display       Primary Care Provider Office Phone # Fax #    Mesfin Rosales [phone] [address] [place] [address]        Equal Access to Services     [facility]: Hadii aad ku hadasho Soomaali, waaxda luqadaha, qaybta kaalmada adeegyada, waxay idiin hayaan adeeg kharakristy la'tjn . So [facility] [phone].    ATENCIÓN: Si habla español, tiene a montemayor disposición servicios gratuitos de asistencia lingüística. Ll[facility] [phone].    We comply with applicable federal civil rights laws and [place] laws. We do not discriminate on the basis of race, color, national origin, age, disability sex, sexual orientation or gender identity.            Thank you!     Thank you for choosing [facility]  for your care. Our goal is always to provide you with excellent care. Hearing back from our patients is one way we can continue to improve our services. Please take a few minutes to complete the written survey that you may receive in the mail after your visit with us. Thank you!             Your Updated Medication List - Protect others around you: Learn how to safely use, store and throw away your medicines at www.disposemymeds.org.          This list is accurate as of: 17  8:47 AM.  Always use your most recent med  list.                   Brand Name Dispense Instructions for use Diagnosis    aluminum chloride 20 % external solution    DRYSOL    60 mL    To improve effect, cover area of application with plastic wrap,  and wash area in morning. As sweating improves, decrease use to 1-2 times weekly.    Generalized hyperhidrosis       betamethasone dipropionate 0.05 % cream    DIPROSONE    45 g    Apply sparingly to affected area twice daily as needed.  Do not apply to face.    Seborrheic dermatitis       FLUoxetine 10 MG capsule    PROzac    30 capsule    Take 1 capsule (10 mg) by mouth daily    MARU (generalized anxiety disorder)       LORazepam 0.5 MG tablet    ATIVAN    20 tablet    1 tab QHS prn insomnia    Insomnia, unspecified type       pilocarpine 1 % ophthalmic solution    PILOCAR    1 Bottle    Place 1 drop Into the left eye 2 times daily    Gregg's pupil, left

## 2017-09-14 ENCOUNTER — OFFICE VISIT (OUTPATIENT)
Dept: SLEEP MEDICINE | Facility: CLINIC | Age: 37
End: 2017-09-14
Payer: COMMERCIAL

## 2017-09-14 VITALS
OXYGEN SATURATION: 97 % | HEART RATE: 65 BPM | DIASTOLIC BLOOD PRESSURE: 74 MMHG | RESPIRATION RATE: 12 BRPM | BODY MASS INDEX: 23.04 KG/M2 | SYSTOLIC BLOOD PRESSURE: 116 MMHG | WEIGHT: 152 LBS | HEIGHT: 68 IN

## 2017-09-14 DIAGNOSIS — F51.04 PSYCHOPHYSIOLOGIC INSOMNIA: Primary | ICD-10-CM

## 2017-09-14 PROCEDURE — 95803 ACTIGRAPHY TESTING: CPT | Performed by: INTERNAL MEDICINE

## 2017-09-14 PROCEDURE — 99214 OFFICE O/P EST MOD 30 MIN: CPT | Performed by: INTERNAL MEDICINE
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
## 2017-09-14 NOTE — PATIENT INSTRUCTIONS
Your blood pressure was checked while you were in clinic today.  Please read the guidelines below about what these numbers mean and what you should do about them.  Your systolic blood pressure is the top number.  This is the pressure when the heart is pumping.  Your diastolic blood pressure is the bottom number.  This is the pressure in between beats.  If your systolic blood pressure is less than 120 and your diastolic blood pressure is less than 80, then your blood pressure is normal. There is nothing more that you need to do about it  If your systolic blood pressure is 120-139 or your diastolic blood pressure is 80-89, your blood pressure may be higher than it should be.  You should have your blood pressure re-checked within a year by a primary care provider.  If your systolic blood pressure is 140 or greater or your diastolic blood pressure is 90 or greater, you may have high blood pressure.  High blood pressure is treatable, but if left untreated over time it can put you at risk for heart attack, stroke, or kidney failure.  You should have your blood pressure re-checked by a primary care provider within the next four weeks.  Your BMI is Body mass index is 23.12 kg/(m^2).  Weight management is a personal decision.  If you are interested in exploring weight loss strategies, the following discussion covers the approaches that may be successful. Body mass index (BMI) is one way to tell whether you are at a healthy weight, overweight, or obese. It measures your weight in relation to your height.  A BMI of 18.5 to 24.9 is in the healthy range. A person with a BMI of 25 to 29.9 is considered overweight, and someone with a BMI of 30 or greater is considered obese. More than two-thirds of American adults are considered overweight or obese.  Being overweight or obese increases the risk for further weight gain. Excess weight may lead to heart disease and diabetes.  Creating and following plans for healthy eating and  physical activity may help you improve your health.  Weight control is part of healthy lifestyle and includes exercise, emotional health, and healthy eating habits. Careful eating habits lifelong are the mainstay of weight control. Though there are significant health benefits from weight loss, long-term weight loss with diet alone may be very difficult to achieve- studies show long-term success with dietary management in less than 10% of people. Attaining a healthy weight may be especially difficult to achieve in those with severe obesity. In some cases, medications, devices and surgical management might be considered.  What can you do?  If you are overweight or obese and are interested in methods for weight loss, you should discuss this with your provider.     Consider reducing daily calorie intake by 500 calories.     Keep a food journal.     Avoiding skipping meals, consider cutting portions instead.    Diet combined with exercise helps maintain muscle while optimizing fat loss. Strength training is particularly important for building and maintaining muscle mass. Exercise helps reduce stress, increase energy, and improves fitness. Increasing exercise without diet control, however, may not burn enough calories to loose weight.       Start walking three days a week 10-20 minutes at a time    Work towards walking thirty minutes five days a week     Eventually, increase the speed of your walking for 1-2 minutes at time    In addition, we recommend that you review healthy lifestyles and methods for weight loss available through the National Institutes of Health patient information sites:  http://win.niddk.nih.gov/publications/index.htm    And look into health and wellness programs that may be available through your health insurance provider, employer, local community center, or nahid club.

## 2017-09-14 NOTE — NURSING NOTE
"Chief Complaint   Patient presents with     RECHECK     d/l actiwatch, sleep diary       Initial /74  Pulse 65  Resp 12  Ht 1.727 m (5' 7.99\")  Wt 68.9 kg (152 lb)  SpO2 97%  BMI 23.12 kg/m2 Estimated body mass index is 23.12 kg/(m^2) as calculated from the following:    Height as of this encounter: 1.727 m (5' 7.99\").    Weight as of this encounter: 68.9 kg (152 lb).  Medication Reconciliation: complete     Zakiya Mcintyre CNA, Sleep Clinic Specialist    "

## 2017-09-14 NOTE — MR AVS SNAPSHOT
After Visit Summary   9/14/2017    Edgardo Mustafa    MRN: [id number]           Patient Information     Date Of Birth          1980        Visit Information        Provider Department      9/14/2017 3:30 PM Yosef Evans MD [facility]        Today's Diagnoses     Psychophysiologic insomnia    -  1      Care Instructions    Your blood pressure was checked while you were in clinic today.  Please read the guidelines below about what these numbers mean and what you should do about them.  Your systolic blood pressure is the top number.  This is the pressure when the heart is pumping.  Your diastolic blood pressure is the bottom number.  This is the pressure in between beats.  If your systolic blood pressure is less than 120 and your diastolic blood pressure is less than 80, then your blood pressure is normal. There is nothing more that you need to do about it  If your systolic blood pressure is 120-139 or your diastolic blood pressure is 80-89, your blood pressure may be higher than it should be.  You should have your blood pressure re-checked within a year by a primary care provider.  If your systolic blood pressure is 140 or greater or your diastolic blood pressure is 90 or greater, you may have high blood pressure.  High blood pressure is treatable, but if left untreated over time it can put you at risk for heart attack, stroke, or kidney failure.  You should have your blood pressure re-checked by a primary care provider within the next four weeks.  Your BMI is Body mass index is 23.12 kg/(m^2).  Weight management is a personal decision.  If you are interested in exploring weight loss strategies, the following discussion covers the approaches that may be successful. Body mass index (BMI) is one way to tell whether you are at a healthy weight, overweight, or obese. It measures your weight in relation to your height.  A BMI of 18.5 to 24.9 is in the healthy range. A  person with a BMI of 25 to 29.9 is considered overweight, and someone with a BMI of 30 or greater is considered obese. More than two-thirds of American adults are considered overweight or obese.  Being overweight or obese increases the risk for further weight gain. Excess weight may lead to heart disease and diabetes.  Creating and following plans for healthy eating and physical activity may help you improve your health.  Weight control is part of healthy lifestyle and includes exercise, emotional health, and healthy eating habits. Careful eating habits lifelong are the mainstay of weight control. Though there are significant health benefits from weight loss, long-term weight loss with diet alone may be very difficult to achieve- studies show long-term success with dietary management in less than 10% of people. Attaining a healthy weight may be especially difficult to achieve in those with severe obesity. In some cases, medications, devices and surgical management might be considered.  What can you do?  If you are overweight or obese and are interested in methods for weight loss, you should discuss this with your provider.     Consider reducing daily calorie intake by 500 calories.     Keep a food journal.     Avoiding skipping meals, consider cutting portions instead.    Diet combined with exercise helps maintain muscle while optimizing fat loss. Strength training is particularly important for building and maintaining muscle mass. Exercise helps reduce stress, increase energy, and improves fitness. Increasing exercise without diet control, however, may not burn enough calories to loose weight.       Start walking three days a week 10-20 minutes at a time    Work towards walking thirty minutes five days a week     Eventually, increase the speed of your walking for 1-2 minutes at time    In addition, we recommend that you review healthy lifestyles and methods for weight loss available through the National Institutes  "of Health patient information sites:  http://win.niddk.nih.gov/publications/index.htm    And look into health and wellness programs that may be available through your health insurance provider, employer, local community center, or [employer].                Follow-ups after your visit        Who to contact     If you have questions or need follow up information about today's clinic visit or your schedule please contact [facility] directly at [phone].  Normal or non-critical lab and imaging results will be communicated to you by MyChart, letter or phone within 4 business days after the clinic has received the results. If you do not hear from us within 7 days, please contact the clinic through BitAccesshart or phone. If you have a critical or abnormal lab result, we will notify you by phone as soon as possible.  Submit refill requests through Obsorb or call your pharmacy and they will forward the refill request to us. Please allow 3 business days for your refill to be completed.          Additional Information About Your Visit        BitAccessharTele[employer] Communications Information     Obsorb lets you send messages to your doctor, view your test results, renew your prescriptions, schedule appointments and more. To sign up, go to [email or web address] . Click on \"Log in\" on the left side of the screen, which will take you to the Welcome page. Then click on \"Sign up Now\" on the right side of the page.     You will be asked to enter the access code listed below, as well as some personal information. Please follow the directions to create your username and password.     Your access code is: [email or web address]  Expires: 10/31/2017  8:48 AM     Your access code will  in 90 days. If you need help or a new code, please call your [place] clinic or [phone].        Care EveryWhere ID     This is your Care EveryWhere ID. This could be used by other organizations to access your [place] medical records  [id number]      " "  Your Vitals Were     Pulse Respirations Height Pulse Oximetry BMI (Body Mass Index)       65 12 1.727 m (5' 7.99\") 97% 23.12 kg/m2        Blood Pressure from Last 3 Encounters:   09/14/17 116/74   08/22/17 115/74   08/02/17 118/70    Weight from Last 3 Encounters:   09/14/17 68.9 kg (152 lb)   08/22/17 72.1 kg (159 lb)   08/02/17 74.4 kg (164 lb)              Today, you had the following     No orders found for display       Primary Care Provider Office Phone # Fax #    Mesfin Rosales [phone] [address] [place] [address]        Equal Access to Services     [facility] : Hadii albino szymanski hadasho Soomaali, waaxda luqadaha, qaybta kaalmada adeegyada, waxaleksander dickersonin solomon herrera . So [facility] [phone].    ATENCIÓN: Si habla español, tiene a montemayor disposición servicios gratuitos de asistencia lingüística. Ll[facility] [phone].    We comply with applicable federal civil rights laws and [place] laws. We do not discriminate on the basis of race, color, national origin, age, disability sex, sexual orientation or gender identity.            Thank you!     Thank you for choosing [facility]  for your care. Our goal is always to provide you with excellent care. Hearing back from our patients is one way we can continue to improve our services. Please take a few minutes to complete the written survey that you may receive in the mail after your visit with us. Thank you!             Your Updated Medication List - Protect others around you: Learn how to safely use, store and throw away your medicines at www.disposemymeds.org.          This list is accurate as of: 9/14/17  4:41 PM.  Always use your most recent med list.                   Brand Name Dispense Instructions for use Diagnosis    aluminum chloride 20 % external solution    DRYSOL    60 mL    To improve effect, cover area of application with plastic wrap,  and wash area in morning. As " sweating improves, decrease use to 1-2 times weekly.    Generalized hyperhidrosis       AMBIEN PO      Take 10 mg by mouth At Bedtime        betamethasone dipropionate 0.05 % cream    DIPROSONE    45 g    Apply sparingly to affected area twice daily as needed.  Do not apply to face.    Seborrheic dermatitis       FLUoxetine 10 MG capsule    PROzac    30 capsule    Take 1 capsule (10 mg) by mouth daily    MARU (generalized anxiety disorder)       LORazepam 0.5 MG tablet    ATIVAN    20 tablet    1 tab QHS prn insomnia    Insomnia, unspecified type       pilocarpine 1 % ophthalmic solution    PILOCAR    1 Bottle    Place 1 drop Into the left eye 2 times daily    Gregg's pupil, left

## 2017-09-14 NOTE — NURSING NOTE
"Chief Complaint   Patient presents with     RECHECK     d/l actiwatch, sleep diary       Initial /74  Pulse 65  Resp 12  Ht 1.727 m (5' 7.99\")  Wt 72.1 kg (159 lb)  SpO2 97%  BMI 24.18 kg/m2 Estimated body mass index is 24.18 kg/(m^2) as calculated from the following:    Height as of this encounter: 1.727 m (5' 7.99\").    Weight as of this encounter: 72.1 kg (159 lb).  Medication Reconciliation: complete     Zakiya Mcintyre CNA, SLeep Clinic specialist  "

## 2017-09-14 NOTE — PROCEDURES
PHYSICIAN INTERPRETATION  [facility] Sleep Schedule-Actigraphy      Patient:  Edgardo Mustafa  MRN:  [id number]   Ext. ID: [id number]   YOB: 1980  Study Date:  8/29/17 to 9/14/17  Referring Physician:  Mesfin Rosales      Dates of recording: from 8/29/17 to 9/14/17                 Quality: good      Sleep diary:   correlates well    Sleep onset typically/or range at 2 minutes to 97 minutes        Sleep efficiency  78%         Total sleep time estimated is 6.15 hrs, with shortest sleep period time of 4.58 hrs and longest sleep period time of 8.09 hrs.    Sleep periods are consolidated    Light exposure periods are appropriately timed to sleep schedule    Bedtime: average  11.15 PM (range 10.10 PM to 12:50 AM)    Get up time:  Average  7.14 AM (range 6.11 AM to 8.32 AM)    Napping is not noted    Interpretation: Sleep wake pattern is consistent with       regular sleep pattern with insufficient sleep      Interpreted and electronically signed by:  Yosef Evans MD

## 2017-10-16 DIAGNOSIS — F51.01 PRIMARY INSOMNIA: ICD-10-CM

## 2017-10-16 DIAGNOSIS — F41.1 GAD (GENERALIZED ANXIETY DISORDER): ICD-10-CM

## 2017-10-16 RX ORDER — FLUOXETINE 10 MG/1
CAPSULE ORAL
Qty: 30 CAPSULE | Refills: 0 | Status: CANCELLED | OUTPATIENT
Start: 2017-10-16

## 2017-10-16 NOTE — TELEPHONE ENCOUNTER
Patient is due for follow-up appointment after beginning Fluoxetine.  Appointment scheduled tomorrow am.  Has enough medication to last until appointment.  MITZI Tomlinson RN

## 2017-10-16 NOTE — TELEPHONE ENCOUNTER
FLUoxetine (PROZAC) 10 MG capsule     Last Written Prescription Date: 8/17/2017  Last Fill Quantity: 30, # refills: 1  Last Office Visit with [facility] primary care provider:  8/17/2017   Next 5 appointments (look out 90 days)     Oct 17, 2017  7:40 AM CDT   [facility] Mes[facility]   [facility]    [address] [place] [id number] [phone]                   Last PHQ-9 score on record=   PHQ-9 SCORE 6/27/2017   Total Score 10

## 2017-10-17 ENCOUNTER — OFFICE VISIT (OUTPATIENT)
Dept: PEDIATRICS | Facility: CLINIC | Age: 37
End: 2017-10-17
Payer: COMMERCIAL

## 2017-10-17 VITALS
SYSTOLIC BLOOD PRESSURE: 100 MMHG | HEART RATE: 62 BPM | TEMPERATURE: 98.7 F | BODY MASS INDEX: 23.57 KG/M2 | WEIGHT: 155 LBS | DIASTOLIC BLOOD PRESSURE: 60 MMHG

## 2017-10-17 DIAGNOSIS — G47.00 INSOMNIA, UNSPECIFIED TYPE: ICD-10-CM

## 2017-10-17 DIAGNOSIS — F41.1 GAD (GENERALIZED ANXIETY DISORDER): Primary | ICD-10-CM

## 2017-10-17 DIAGNOSIS — R21 RASH: ICD-10-CM

## 2017-10-17 PROCEDURE — 99213 OFFICE O/P EST LOW 20 MIN: CPT | Performed by: INTERNAL MEDICINE

## 2017-10-17 RX ORDER — ZOLPIDEM TARTRATE 10 MG/1
TABLET ORAL
Qty: 30 TABLET | Refills: 0 | Status: SHIPPED | OUTPATIENT
Start: 2017-10-17 | End: 2017-11-14

## 2017-10-17 RX ORDER — FLUOXETINE 10 MG/1
10 CAPSULE ORAL DAILY
Qty: 90 CAPSULE | Refills: 3 | Status: SHIPPED | OUTPATIENT
Start: 2017-10-17 | End: 2018-10-15

## 2017-10-17 ASSESSMENT — ANXIETY QUESTIONNAIRES
7. FEELING AFRAID AS IF SOMETHING AWFUL MIGHT HAPPEN: NOT AT ALL
2. NOT BEING ABLE TO STOP OR CONTROL WORRYING: SEVERAL DAYS
GAD7 TOTAL SCORE: 6
5. BEING SO RESTLESS THAT IT IS HARD TO SIT STILL: MORE THAN HALF THE DAYS
IF YOU CHECKED OFF ANY PROBLEMS ON THIS QUESTIONNAIRE, HOW DIFFICULT HAVE THESE PROBLEMS MADE IT FOR YOU TO DO YOUR WORK, TAKE CARE OF THINGS AT HOME, OR GET ALONG WITH OTHER PEOPLE: NOT DIFFICULT AT ALL
1. FEELING NERVOUS, ANXIOUS, OR ON EDGE: SEVERAL DAYS
3. WORRYING TOO MUCH ABOUT DIFFERENT THINGS: SEVERAL DAYS
6. BECOMING EASILY ANNOYED OR IRRITABLE: NOT AT ALL

## 2017-10-17 ASSESSMENT — PATIENT HEALTH QUESTIONNAIRE - PHQ9
5. POOR APPETITE OR OVEREATING: SEVERAL DAYS
SUM OF ALL RESPONSES TO PHQ QUESTIONS 1-9: 6

## 2017-10-17 NOTE — PATIENT INSTRUCTIONS
INSTRUCTIONS FOR TODAY:     continue fluoxetine 10mg daily.   In 1 month if anxiety persists we can increase to 2 tabs daily or 20mg   ambien as needed, try to wean off if possible, can transition to melatonin 10mg each evening as needed       Dr Rosales

## 2017-10-17 NOTE — NURSING NOTE
"Chief Complaint   Patient presents with     Anxiety       Initial /60 (Cuff Size: Adult Regular)  Pulse 62  Temp 98.7  F (37.1  C) (Oral)  Wt 155 lb (70.3 kg)  BMI 23.57 kg/m2 Estimated body mass index is 23.57 kg/(m^2) as calculated from the following:    Height as of 9/14/17: 5' 7.99\" (1.727 m).    Weight as of this encounter: 155 lb (70.3 kg).  Medication Reconciliation: isiah Cho CMA    "

## 2017-10-17 NOTE — PROGRESS NOTES
SUBJECTIVE:   Edgardo Mustafa is a 37 year old male who presents to clinic today for the following health issues:    Anxiety Follow-Up    Status since last visit: Improved.  Current rx fluoxetine 10mg    Started improving after 1 month, better at 2 months--some recurrent sx in past 1-2 weeks    Other associated symptoms:   Insomnia-continues ambien prn    Complicating factors:   Significant life event: No   Current substance abuse: None  Depression symptoms: No  MARU-7 SCORE 6/27/2017 10/17/2017   Total Score 8 6         Amount of exercise or physical activity: 2-3 days/week for an average of 45-60 minutes    Problems taking medications regularly: No    Medication side effects: none  Diet: regular (no restrictions)      Patient Active Problem List   Diagnosis     Primary insomnia     Generalized hyperhidrosis     Adie's pupil, left     Partial traumatic transphalangeal amputation of finger, sequela (H)     Neuropathic pain of hand, left     MARU (generalized anxiety disorder)     Past Surgical History:   Procedure Laterality Date     SURGICAL PATHOLOGY EXAM      repair deviated septum       Social History   Substance Use Topics     Smoking status: Never Smoker     Smokeless tobacco: Never Used     Alcohol use Yes      Comment: rare     Family History   Problem Relation Age of Onset     DIABETES No family hx of      Coronary Artery Disease No family hx of      Cancer - colorectal No family hx of      Prostate Cancer No family hx of      Hypertension Mother          Current Outpatient Prescriptions   Medication Sig Dispense Refill     zolpidem (AMBIEN) 10 MG tablet One half to one tab QHS prn 30 tablet 0     FLUoxetine (PROZAC) 10 MG capsule Take 1 capsule (10 mg) by mouth daily 90 capsule 3     pilocarpine (PILOCAR) 1 % ophthalmic solution Place 1 drop Into the left eye 2 times daily 1 Bottle 3     aluminum chloride (DRYSOL) 20 % external solution To improve effect, cover area of application with plastic wrap,   and wash area in morning. As sweating improves, decrease use to 1-2 times weekly. 60 mL 6     betamethasone dipropionate (DIPROSONE) 0.05 % cream Apply sparingly to affected area twice daily as needed.  Do not apply to face. 45 g 2     OBJECTIVE:                                                    /60 (Cuff Size: Adult Regular)  Pulse 62  Temp 98.7  F (37.1  C) (Oral)  Wt 155 lb (70.3 kg)  BMI 23.57 kg/m2 Body mass index is 23.57 kg/(m^2).  GENERAL:  alert,  no distress  Psych: normal affect     ASSESSMENT/PLAN:                                                        ICD-10-CM    1. MARU (generalized anxiety disorder) F41.1 FLUoxetine (PROZAC) 10 MG capsule   2. Insomnia, unspecified type G47.00 zolpidem (AMBIEN) 10 MG tablet      Patient Instructions   INSTRUCTIONS FOR TODAY:     continue fluoxetine 10mg daily.   In 1 month if anxiety persists, pt will increase to 20mg daily   insomnia.   ambien as needed, try to wean off , can transition to melatonin 10mg each evening as needed   15 minutes spent face-to-face with patient today with greater than 50% of that time spent in counseling and coordination of care regarding the issues above.      Mesfin Rosales MD  [facility]

## 2017-10-17 NOTE — MR AVS SNAPSHOT
"              After Visit Summary   10/17/2017    Edgardo Mustafa    MRN: [id number]           Patient Information     Date Of Birth          1980        Visit Information        Provider Department      10/17/2017 7:40 AM [facility] MD [facility]        Today's Diagnoses     Insomnia, unspecified type    -  1    MARU (generalized anxiety disorder)          Care Instructions    INSTRUCTIONS FOR TODAY:     continue fluoxetine 10mg daily.   In 1 month if anxiety persists we can increase to 2 tabs daily or 20mg   ambien as needed, try to wean off if possible, can transition to melatonin 10mg each evening as needed       Dr Rosales            Follow-ups after your visit        Who to contact     If you have questions or need follow up information about today's clinic visit or your schedule please contact [facility] directly at [phone].  Normal or non-critical lab and imaging results will be communicated to you by Soundstachehart, letter or phone within 4 business days after the clinic has received the results. If you do not hear from us within 7 days, please contact the clinic through Soundstachehart or phone. If you have a critical or abnormal lab result, we will notify you by phone as soon as possible.  Submit refill requests through Jolicloud or call your pharmacy and they will forward the refill request to us. Please allow 3 business days for your refill to be completed.          Additional Information About Your Visit        MyChart Information     Jolicloud lets you send messages to your doctor, view your test results, renew your prescriptions, schedule appointments and more. To sign up, go to [email or web address] . Click on \"Log in\" on the left side of the screen, which will take you to the Welcome page. Then click on \"Sign up Now\" on the right side of the page.     You will be asked to enter the access code listed below, as well as some personal information. Please follow the " directions to create your username and password.     Your access code is: [email or web address]  Expires: 10/31/2017  8:48 AM     Your access code will  in 90 days. If you need help or a new code, please call your [facility] clinic or [phone].        Care EveryWhere ID     This is your Care EveryWhere ID. This could be used by other organizations to access your [facility] medical records  [id number]        Your Vitals Were     Pulse Temperature BMI (Body Mass Index)             62 98.7  F (37.1  C) (Oral) 23.57 kg/m2          Blood Pressure from Last 3 Encounters:   10/17/17 100/60   17 116/74   17 115/74    Weight from Last 3 Encounters:   10/17/17 155 lb (70.3 kg)   17 152 lb (68.9 kg)   17 159 lb (72.1 kg)              Today, you had the following     No orders found for display         Today's Medication Changes          These changes are accurate as of: 10/17/17  8:07 AM.  If you have any questions, ask your nurse or doctor.               These medicines have changed or have updated prescriptions.        Dose/Directions    zolpidem 10 MG tablet   Commonly known as:  AMBIEN   This may have changed:    - medication strength  - how much to take  - how to take this  - when to take this  - additional instructions   Used for:  Insomnia, unspecified type   Changed by:  Mesfin Rosales MD        One half to one tab QHS prn   Quantity:  30 tablet   Refills:  0         Stop taking these medicines if you haven't already. Please contact your care team if you have questions.     LORazepam 0.5 MG tablet   Commonly known as:  ATIVAN   Stopped by:  Mesfin Rosales MD                Where to get your medicines      These medications were sent to [facility] [id number] [facility] [address]   [place] [address] [address]     Phone:  [phone]     FLUoxetine 10 MG capsule         Some of these will need a paper prescription and others can be bought  over the counter.  Ask your nurse if you have questions.     Bring a paper prescription for each of these medications     zolpidem 10 MG tablet                Primary Care Provider Office Phone # Fax #    Mesfin Rosales [phone] [address] [place] [address]        Equal Access to Services     [facility]: Hadii aad ku hadasho Soomaali, waaxda luqadaha, qaybta kaalmada adeegyada, waxay jaylain haytjn adejody abdias sharon garcia. So [facility] [phone].    ATENCIÓN: Si habla español, tiene a montemayor disposición servicios gratuitos de asistencia lingüística. [facility] [phone].    We comply with applicable federal civil rights laws and [place] laws. We do not discriminate on the basis of race, color, national origin, age, disability, sex, sexual orientation, or gender identity.            Thank you!     Thank you for choosing [facility] [place]  for your care. Our goal is always to provide you with excellent care. Hearing back from our patients is one way we can continue to improve our services. Please take a few minutes to complete the written survey that you may receive in the mail after your visit with us. Thank you!             Your Updated Medication List - Protect others around you: Learn how to safely use, store and throw away your medicines at www.disposemymeds.org.          This list is accurate as of: 10/17/17  8:07 AM.  Always use your most recent med list.                   Brand Name Dispense Instructions for use Diagnosis    aluminum chloride 20 % external solution    DRYSOL    60 mL    To improve effect, cover area of application with plastic wrap,  and wash area in morning. As sweating improves, decrease use to 1-2 times weekly.    Generalized hyperhidrosis       betamethasone dipropionate 0.05 % cream    DIPROSONE    45 g    Apply sparingly to affected area twice daily as needed.  Do not apply to face.    Seborrheic dermatitis       FLUoxetine 10 MG capsule     PROzac    90 capsule    Take 1 capsule (10 mg) by mouth daily    MARU (generalized anxiety disorder)       pilocarpine 1 % ophthalmic solution    PILOCAR    1 Bottle    Place 1 drop Into the left eye 2 times daily    Adie's pupil, left       zolpidem 10 MG tablet    AMBIEN    30 tablet    One half to one tab QHS prn    Insomnia, unspecified type

## 2017-10-18 ASSESSMENT — ANXIETY QUESTIONNAIRES: GAD7 TOTAL SCORE: 6

## 2017-11-07 ENCOUNTER — OFFICE VISIT (OUTPATIENT)
Dept: FAMILY MEDICINE | Facility: CLINIC | Age: 37
End: 2017-11-07
Payer: COMMERCIAL

## 2017-11-07 DIAGNOSIS — L30.9 DERMATITIS: ICD-10-CM

## 2017-11-07 DIAGNOSIS — B36.0 TINEA VERSICOLOR: Primary | ICD-10-CM

## 2017-11-07 LAB
KOH PREP SPEC: ABNORMAL
SPECIMEN SOURCE: ABNORMAL

## 2017-11-07 PROCEDURE — 87220 TISSUE EXAM FOR FUNGI: CPT | Performed by: FAMILY MEDICINE

## 2017-11-07 PROCEDURE — 99214 OFFICE O/P EST MOD 30 MIN: CPT | Performed by: FAMILY MEDICINE

## 2017-11-07 RX ORDER — FLUCONAZOLE 150 MG/1
300 TABLET ORAL
Qty: 8 TABLET | Refills: 0 | Status: SHIPPED | OUTPATIENT
Start: 2017-11-07 | End: 2017-11-29
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
## 2017-11-07 NOTE — PATIENT INSTRUCTIONS
FUTURE APPOINTMENTS  Follow up as needed.    FLUCONAZOLE FOR TINEA VERSICOLOR INSTRUCTIONS  1. Take 2 capsules/tablets of fluconazole 150 mg with a carbonated beverage or orange juice. This increases the absorption by your body of the medication.    2. One hour after taking, exercise to a light sweat, as the medication works best when secreted in sweat. Do not shower for 6 hours, to allow the medication time to work.    3. Repeat this same process every 7 days for 4 weeks in total.     Note: Although the problem has been adequately treated, the skin discoloration may not resolve for several weeks.    Recurrent Cases:  It is very common for this to recur with exercise or hot weather. If this continues to be a problem, Selsun blue, ketoconazole or another shampoo may be used as a body wash once a week or so to prevent it from coming back.

## 2017-11-07 NOTE — MR AVS SNAPSHOT
After Visit Summary   11/7/2017    Edgardo Mustafa    MRN: [id number]           Patient Information     Date Of Birth          1980        Visit Information        Provider Department      11/7/2017 2:20 PM Heather Last MD [facility] - Primary Care Skin        Today's Diagnoses     Dermatitis    -  1      Care Instructions    FUTURE APPOINTMENTS  Follow up as needed.    FLUCONAZOLE FOR TINEA VERSICOLOR INSTRUCTIONS  1. Take 2 capsules/tablets of fluconazole 150 mg with a carbonated beverage or orange juice. This increases the absorption by your body of the medication.    2. One hour after taking, exercise to a light sweat, as the medication works best when secreted in sweat. Do not shower for 6 hours, to allow the medication time to work.    3. Repeat this same process every 7 days for 4 weeks in total.     Note: Although the problem has been adequately treated, the skin discoloration may not resolve for several weeks.    Recurrent Cases:  It is very common for this to recur with exercise or hot weather. If this continues to be a problem, Selsun blue, ketoconazole or another shampoo may be used as a body wash once a week or so to prevent it from coming back.          Follow-ups after your visit        Who to contact     If you have questions or need follow up information about today's clinic visit or your schedule please contact [facility] - PRIMARY CARE SKIN directly at [phone].  Normal or non-critical lab and imaging results will be communicated to you by MyChart, letter or phone within 4 business days after the clinic has received the results. If you do not hear from us within 7 days, please contact the clinic through MyChart or phone. If you have a critical or abnormal lab result, we will notify you by phone as soon as possible.  Submit refill requests through [employer] or call your pharmacy and they will forward the refill request to us. Please allow 3  "business days for your refill to be completed.          Additional Information About Your Visit        MyChart Information     [employer] lets you send messages to your doctor, view your test results, renew your prescriptions, schedule appointments and more. To sign up, go to [email or web address] . Click on \"Log in\" on the left side of the screen, which will take you to the Welcome page. Then click on \"Sign up Now\" on the right side of the page.     You will be asked to enter the access code listed below, as well as some personal information. Please follow the directions to create your username and password.     Your access code is: [email or web address]  Expires: 2018  2:53 PM     Your access code will  in 90 days. If you need help or a new code, please call your [facility] clinic or [phone].        Care EveryWhere ID     This is your Care EveryWhere ID. This could be used by other organizations to access your [facility] medical records  [id number]         Blood Pressure from Last 3 Encounters:   10/17/17 100/60   17 116/74   17 115/74    Weight from Last 3 Encounters:   10/17/17 155 lb (70.3 kg)   17 152 lb (68.9 kg)   17 159 lb (72.1 kg)              We Performed the Following     KOH skin hair or nails        Primary Care Provider Office Phone # Fax #    Mesfin Rosales [phone] [address] [place] [address]        Equal Access to Services     [facility] : Hadii albino ku hadasho Sonikolaiali, waaxda luqadaha, qaybta kaalmada adeegyavalerie, joleen garcia. So [facility] [phone].    ATENCIÓN: Si habla español, tiene a montemayor disposición servicios gratuitos de asistencia lingüística. Llame al [phone].    We comply with applicable federal civil rights laws and [place] laws. We do not discriminate on the basis of race, color, national origin, age, disability, sex, sexual orientation, or gender identity.            Thank you! "     Thank you for choosing [facility] - PRIMARY CARE SKIN  for your care. Our goal is always to provide you with excellent care. Hearing back from our patients is one way we can continue to improve our services. Please take a few minutes to complete the written survey that you may receive in the mail after your visit with us. Thank you!             Your Updated Medication List - Protect others around you: Learn how to safely use, store and throw away your medicines at www.disposemymeds.org.          This list is accurate as of: 11/7/17  2:53 PM.  Always use your most recent med list.                   Brand Name Dispense Instructions for use Diagnosis    aluminum chloride 20 % external solution    DRYSOL    60 mL    To improve effect, cover area of application with plastic wrap,  and wash area in morning. As sweating improves, decrease use to 1-2 times weekly.    Generalized hyperhidrosis       betamethasone dipropionate 0.05 % cream    DIPROSONE    45 g    Apply sparingly to affected area twice daily as needed.  Do not apply to face.    Seborrheic dermatitis       FLUoxetine 10 MG capsule    PROzac    90 capsule    Take 1 capsule (10 mg) by mouth daily    MARU (generalized anxiety disorder)       pilocarpine 1 % ophthalmic solution    PILOCAR    1 Bottle    Place 1 drop Into the left eye 2 times daily    Adie's pupil, left       zolpidem 10 MG tablet    AMBIEN    30 tablet    One half to one tab QHS prn    Insomnia, unspecified type
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
## 2017-11-14 DIAGNOSIS — F51.04 CHRONIC INSOMNIA: Primary | ICD-10-CM

## 2017-11-14 RX ORDER — ZOLPIDEM TARTRATE 10 MG/1
TABLET ORAL
Qty: 90 TABLET | Refills: 0 | Status: SHIPPED | OUTPATIENT
Start: 2017-11-14 | End: 2018-02-09

## 2017-11-14 NOTE — TELEPHONE ENCOUNTER
pts wife called back with info on faxing the script.  [facility] in the town. Telephone # [phone] she does not have the fax #   Pharmacy line # [phone]. Will have to call.         [facility] [place] [address]

## 2017-11-14 NOTE — TELEPHONE ENCOUNTER
Notified patient rx is ready but he doesn't know what pharmacy to fax it to. He is in [place] and will call with the info.  EMILIANO Cho

## 2017-11-14 NOTE — TELEPHONE ENCOUNTER
Patient calling that he had been advised to try to wean off the Ambien and he has been trying for about a month and when he tries he has difficulty falling asleep but if he doesn't he is doing fine. Wants to continue on it. Needs a refill at this time. He is going to be out of town until the end of the week but will be out of Ambien tomorrow. [phone] ok to .  He will call back with what pharmacy to send to if ok'd.   Jessie Claire, RN

## 2017-12-19 ENCOUNTER — TRANSFERRED RECORDS (OUTPATIENT)
Dept: HEALTH INFORMATION MANAGEMENT | Facility: CLINIC | Age: 37
End: 2017-12-19

## 2018-02-09 DIAGNOSIS — F51.04 CHRONIC INSOMNIA: ICD-10-CM

## 2018-02-09 NOTE — TELEPHONE ENCOUNTER
Patient states he has enough to get through the weekend.  Controlled Substance Refill Request for Ambien 10 MG    Last refill: 11/14/17    Last clinic visit: 11/7/17     Clinic visit frequency required: not mentioned  Next appt: none    Controlled substance agreement on file: No.    Documentation in problem list reviewed:  Yes    Processing:  Fax Rx to [facility] pharmacy    [place] reviewed:  reviewed- no concerns  [place]:  [email or web address]

## 2018-02-12 RX ORDER — ZOLPIDEM TARTRATE 10 MG/1
TABLET ORAL
Qty: 90 TABLET | Refills: 0 | Status: SHIPPED | OUTPATIENT
Start: 2018-02-12 | End: 2018-05-08

## 2018-02-27 ENCOUNTER — OFFICE VISIT (OUTPATIENT)
Dept: PEDIATRICS | Facility: CLINIC | Age: 38
End: 2018-02-27
Payer: COMMERCIAL

## 2018-02-27 ENCOUNTER — TELEPHONE (OUTPATIENT)
Dept: PEDIATRICS | Facility: CLINIC | Age: 38
End: 2018-02-27

## 2018-02-27 VITALS
BODY MASS INDEX: 24.86 KG/M2 | TEMPERATURE: 98.3 F | DIASTOLIC BLOOD PRESSURE: 76 MMHG | OXYGEN SATURATION: 99 % | WEIGHT: 164 LBS | HEART RATE: 58 BPM | HEIGHT: 68 IN | SYSTOLIC BLOOD PRESSURE: 110 MMHG

## 2018-02-27 DIAGNOSIS — N50.812 TESTICULAR PAIN, LEFT: ICD-10-CM

## 2018-02-27 DIAGNOSIS — M54.50 ACUTE BILATERAL LOW BACK PAIN WITHOUT SCIATICA: Primary | ICD-10-CM

## 2018-02-27 PROCEDURE — 99214 OFFICE O/P EST MOD 30 MIN: CPT | Performed by: PHYSICIAN ASSISTANT

## 2018-02-27 RX ORDER — HYDROCODONE BITARTRATE AND ACETAMINOPHEN 5; 325 MG/1; MG/1
1 TABLET ORAL EVERY 6 HOURS PRN
Qty: 15 TABLET | Refills: 0 | Status: SHIPPED | OUTPATIENT
Start: 2018-02-27 | End: 2018-03-13

## 2018-02-27 NOTE — MR AVS SNAPSHOT
After Visit Summary   2/27/2018    Edgardo Mustafa    MRN: [id number]           Patient Information     Date Of Birth          1980        Visit Information        Provider Department      2/27/2018 2:30 PM Jorge Rehman PA-C [facility]        Today's Diagnoses     Acute bilateral low back pain without sciatica    -  1    Testicular pain, left          Care Instructions             Low Back Pain            What is low back pain?   Low back pain is pain and stiffness in the lower back. It is one of the most common reasons people miss work.   How does it occur?   Your lower back is called your lumbar spine. It is made up of 5 bones called lumbar vertebrae. In between the vertebrae are shock absorbers called disks. Back pain can occur from an injury to the vertebrae or when a disk bulges or herniates.   Low back pain is usually caused when a ligament or muscle holding a vertebra in its proper position is strained. Vertebrae are bones that make up the spinal column through which the spinal cord passes. When these muscles or ligaments become weak or strained, the spine loses its stability, resulting in pain.   Low back pain can occur if your job involves lifting and carrying heavy objects, or if you spend a lot of time sitting or standing in one position or bending over. It can be caused by a fall or by unusually strenuous exercise. It can be brought on by the tension and stress that cause headaches in some people. It can even be brought on by violent sneezing or coughing.   People who are overweight may have low back pain because of the added stress on their back.   Back pain may occur when the muscles, joints, bones, and connective tissues of the back become inflamed as a result of an infection or an immune system problem. Arthritic disorders as well as some congenital and degenerative conditions may cause back pain.   Back pain accompanied by loss of bladder or bowel  control, trouble moving your legs, or numbness or tingling in your arms or legs requires immediate medical treatment.   What are the symptoms?   Symptoms include:   pain in the back or legs   stiffness, spasm, or limited motion   The pain may be constant or may happen only in certain positions. It may get worse when you cough, sneeze, bend, twist, or strain during a bowel movement. The pain may be in only one spot or may spread to other areas, most commonly down the buttocks and into the back of the thigh.   A low back strain typically does not produce pain past the knee into the calf or foot. Tingling or numbness in the calf or foot may indicate a herniated disk or pinched nerve.   Be sure to see your healthcare provider if:   You have weakness in your leg, especially if you cannot lift your foot, because this may be a sign of nerve damage.   You have new bowel or bladder problems as well as back pain, which may be a sign of severe injury to your spinal cord.   You have pain that gets worse despite treatment.   How is it diagnosed?   Your healthcare provider will review your medical history and examine you. You may have X-rays, an MRI, CT scan, or a bone scan.   How is it treated?   To treat this condition:   Put an ice pack, gel pack, or package of frozen vegetables, wrapped in a cloth on the area every 3 to 4 hours, for up to 20 minutes at a time for the first 2 or 3 days.   Use a heating pad or hot water bottle. Don't let the heating pad get too hot, and don't fall asleep with it. You could get a burn.   Rest in bed on a firm mattress. Often it helps to lie on your back with your knees raised on a pillow. However, some people prefer to lie on their side with their knees bent. It's best to try to stay active, so try not to rest in bed longer than 1 to 2 days.   Take muscle relaxants as recommended by your healthcare provider.   Take an anti-inflammatory such as ibuprofen, or other medicine as directed by your  provider. Nonsteroidal anti-inflammatory medicines (NSAIDs) may cause stomach bleeding and other problems. These risks increase with age. Read the label and take as directed. Unless recommended by your healthcare provider, do not take for more than 10 days.   Get a back massage by a trained person.   Wear a belt or corset to support your back.   Do the exercises recommended by your provider. Your provider may also prescribe physical therapy.   Talk with a counselor, if your back pain is related to tension caused by emotional problems.   When the pain is gone, ask your healthcare provider about starting an exercise program such as the following:   Exercise moderately every day, using stretching and warm-up exercises suggested by your provider or physical therapist.   Exercise vigorously for about 30 minutes 3 times a week by walking, swimming, using a stationary bicycle, or doing low-impact aerobics.   Exercising regularly will not only help your back, it will also help keep you healthier overall.   How long will the effects last?   The effects of back pain last as long as the cause exists or until your body recovers from the strain, usually a day or two but sometimes weeks.   How can I take care of myself?   In addition to the treatment described above, keep in mind these suggestions:   Practice good posture. Stand with your head up, shoulders straight, chest forward, weight balanced evenly on both feet, and pelvis tucked in.   Lose weight if you are overweight   Keep your core muscles strong. These are your abdominal and back muscles.   Sleep without a pillow under your head.   Pain is the best way to  the pace you should set in increasing your activity and exercise. Minor discomfort, stiffness, soreness, and mild aches need not interfere with activity. However, limit your activities temporarily if:   Your symptoms return.   The pain increases when you are more active.   The pain increases within 24 hours  after a new or higher level of activity.   When can I return to my normal activities?   Everyone recovers from an injury at a different rate. Return to your activities depends on how soon your back recovers, not by how many days or weeks it has been since your injury has occurred. In general, the longer you have symptoms before you start treatment, the longer it will take to get better. The goal is to return to your normal activities as soon as is safely possible. If you return too soon you may worsen your injury.   It is important that you have fully recovered from your low back pain before you return to any strenuous activity. You must be able to have the same range of motion that you had before your injury. You must be able to walk and twist without pain.   What can I do to help prevent low back pain?   You can reduce the strain on your back by doing the following:   Don't push with your arms when you move a heavy object. Turn around and push backwards so the strain is taken by your legs.   Whenever you sit, sit in a straight-backed chair and hold your spine against the back of the chair.   Bend your knees and hips and keep your back straight when you lift a heavy object.   Avoid lifting heavy objects higher than your waist.   Hold packages you carry close to your body, with your arms bent.   Use a footrest for one foot when you stand or sit in one spot for a long time. This keeps your back straight.   Bend your knees when you bend over.   Sit close to the pedals when you drive and use your seat belt and a hard backrest or pillow.   Lie on your side with your knees bent when you sleep or rest. It may help to put a pillow between your knees.   Put a pillow under your knees when you sleep on your back.   Raise the foot of the bed 8 inches to discourage sleeping on your stomach unless you have other problems that require that you keep your head elevated.   To rest your back, hold each of these positions for  5?minutes or longer:   Lie on your back, bend your knees, and put pillows under your knees.   Lie on your back on the floor with a pillow under your neck. Bend your knees to a 90-degree angle, and put your lower legs and feet on a chair.   Lie on your back, bend your knees, and bring one knee up to your chest and hold it there. Repeat with the other knee, then bring both knees to your chest. When holding your knee to your chest, grab your thigh rather than your lower leg to avoid over flexing your knee.     Published by [employer].  This content is reviewed periodically and is subject to change as new health information becomes available. The information is intended to inform and educate and is not a replacement for medical evaluation, advice, diagnosis or treatment by a healthcare professional.   Developed by Daisha Hou RN, [place], and [employer].   ? 2010 [facility] and/or its affiliates. All Rights Reserved.           Low Back Pain Exercise          Standing hamstring stretch: Put the heel of one leg on a stool about 15 inches high. Keep your leg straight. Lean forward, bending at the hips until you feel a mild stretch in the back of your thigh. Make sure you do not roll your shoulders or bend at the waist when doing this. You want to stretch your leg, not your lower back. Hold the stretch for 15 to 30 seconds. Repeat with each leg 3 times.   Cat and camel: Get down on your hands and knees. Let your stomach sag, allowing your back to curve downward. Hold this position for 5 seconds. Then arch your back and hold for 5 seconds. Do 3 sets of 10.   Quadruped arm and leg raise: Get down on your hands and knees. Pull in your belly button and tighten your abdominal muscles to stiffen your spine. While keeping your abdominals tight, raise one arm and the opposite leg away from you. Hold this position for 5 seconds. Lower your arm and leg slowly and change sides. Do this 10 times on each side.   Pelvic tilt: Lie on  your back with your knees bent and your feet flat on the floor. Tighten your abdominal muscles and push your lower back into the floor. Hold this position for 5 seconds, then relax. Do 3 sets of 10.   Partial curl: Lie on your back with your knees bent and your feet flat on the floor. Tighten your stomach muscles. Tuck your chin to your chest. With your hands stretched out in front of you, curl your upper body forward until your shoulders clear the floor. Hold this position for 3 seconds. Don't hold your breath. It helps to breathe out as you lift your shoulders up. Relax back to the floor. Repeat 10 times. Build to 3 sets of 10. To challenge yourself, clasp your hands behind your head and keep your elbows out to the side.   Gluteal stretch: Lie on your back with both knees bent. Rest the ankle of one leg over the knee of your other leg. Grasp the thigh of the bottom leg and pull toward your chest. You will feel a stretch along the buttocks and possibly along the outside of your hip. Hold the stretch for 15 to 30 seconds. Repeat 3 times with each leg.   Extension exercise:   0. Lie face down on the floor for 5 minutes. If this hurts too much, lie face down with a pillow under your stomach. This should relieve your leg or back pain. When you can lie on your stomach for 5 minutes without a pillow, you can continue with Part B of this exercise.   0. After lying on your stomach for 5 minutes, prop yourself up on your elbows for another 5 minutes. If you can do this without having more leg or buttock pain, you can start doing part C of this exercise.   0. Lie on your stomach with your hands under your shoulders. Then press down on your hands and extend your elbows while keeping your hips flat on the floor. Hold for 1 second and lower yourself to the floor. Do 3 to 5 sets of 10 repetitions. Rest for 1 minute between sets. You should have no pain in your legs when you do this, but it is normal to feel some pain in your  lower back.   Do this exercise several times a day.   Side plank: Lie on your side with your legs, hips, and shoulders in a straight line. Prop yourself up onto your forearm so your elbow is directly under your shoulder. Lift your hips off the floor and balance on your forearm and the outside of your foot. Try to hold this position for 15 seconds, then slowly lower your hip to the ground. Switch sides and repeat. Work up to holding for 1 minute or longer. This exercise can be made easier by starting with your knees and hips flexed toward your chest.   Published by [employer].  This content is reviewed periodically and is subject to change as new health information becomes available. The information is intended to inform and educate and is not a replacement for medical evaluation, advice, diagnosis or treatment by a healthcare professional.   Written by Katie Lindo MS, PT, and Daisha Burleson PT, [facility], [facility], for [facility]   ? 2010 [facility] and/or its affiliates. All Rights Reserved.         Copyright   [employer] 2011                      Follow-ups after your visit        Who to contact     If you have questions or need follow up information about today's clinic visit or your schedule please contact [facility] directly at [phone].  Normal or non-critical lab and imaging results will be communicated to you by MyChart, letter or phone within 4 business days after the clinic has received the results. If you do not hear from us within 7 days, please contact the clinic through MyChart or phone. If you have a critical or abnormal lab result, we will notify you by phone as soon as possible.  Submit refill requests through [employer] or call your pharmacy and they will forward the refill request to us. Please allow 3 business days for your refill to be completed.          Additional Information About Your Visit        [employer] Information     [employer] [facility] lets you send messages to your doctor,  "view your test results, renew your prescriptions, schedule appointments and more. To sign up, go to [email or web address] . Click on \"Log in\" on the left side of the screen, which will take you to the Welcome page. Then click on \"Sign up Now\" on the right side of the page.     You will be asked to enter the access code listed below, as well as some personal information. Please follow the directions to create your username and password.     Your access code is: [email or web address]  Expires: 2018  3:20 PM     Your access code will  in 90 days. If you need help or a new code, please call your [facility] clinic or [phone].        Care EveryWhere ID     This is your Care EveryWhere ID. This could be used by other organizations to access your [facility] medical records  [id number]        Your Vitals Were     Pulse Temperature Height Pulse Oximetry BMI (Body Mass Index)       58 98.3  F (36.8  C) (Oral) 5' 8\" (1.727 m) 99% 24.94 kg/m2        Blood Pressure from Last 3 Encounters:   18 110/76   10/17/17 100/60   17 116/74    Weight from Last 3 Encounters:   18 164 lb (74.4 kg)   10/17/17 155 lb (70.3 kg)   17 152 lb (68.9 kg)              Today, you had the following     No orders found for display         Today's Medication Changes          These changes are accurate as of 18  3:20 PM.  If you have any questions, ask your nurse or doctor.               Start taking these medicines.        Dose/Directions    HYDROcodone-acetaminophen 5-325 MG per tablet   Commonly known as:  NORCO   Used for:  Acute bilateral low back pain without sciatica, Testicular pain, left   Started by:  Jorge Rehman PA-C        Dose:  1 tablet   Take 1 tablet by mouth every 6 hours as needed for moderate to severe pain   Quantity:  15 tablet   Refills:  0            Where to get your medicines      Some of these will need a paper prescription and others can be bought over the counter.  Ask your " nurse if you have questions.     Bring a paper prescription for each of these medications     HYDROcodone-acetaminophen 5-325 MG per tablet                Primary Care Provider Office Phone # Fax #    Mesfin Rosales [phone] [address] [place] [address]        Equal Access to Services     [facility]: Hadii aad ku hadasho Soomaali, waaxda luqadaha, qaybta kaalmada adeegyada, waxay jaylain haytjn lisha washingtonshankristy herrera . So [facility] [phone].    ATENCIÓN: Si habla español, tiene a montemayor disposición servicios gratuitos de asistencia lingüística. [facility] [phone].    We comply with applicable federal civil rights laws and [place] laws. We do not discriminate on the basis of race, color, national origin, age, disability, sex, sexual orientation, or gender identity.            Thank you!     Thank you for choosing [facility]  for your care. Our goal is always to provide you with excellent care. Hearing back from our patients is one way we can continue to improve our services. Please take a few minutes to complete the written survey that you may receive in the mail after your visit with us. Thank you!             Your Updated Medication List - Protect others around you: Learn how to safely use, store and throw away your medicines at www.disposemymeds.org.          This list is accurate as of 2/27/18  3:20 PM.  Always use your most recent med list.                   Brand Name Dispense Instructions for use Diagnosis    aluminum chloride 20 % external solution    DRYSOL    60 mL    To improve effect, cover area of application with plastic wrap,  and wash area in morning. As sweating improves, decrease use to 1-2 times weekly.    Generalized hyperhidrosis       FLUoxetine 10 MG capsule    PROzac    90 capsule    Take 1 capsule (10 mg) by mouth daily    MARU (generalized anxiety disorder)       HYDROcodone-acetaminophen 5-325 MG per tablet    NORCO    15 tablet     Take 1 tablet by mouth every 6 hours as needed for moderate to severe pain    Acute bilateral low back pain without sciatica, Testicular pain, left       pilocarpine 1 % ophthalmic solution    PILOCAR    1 Bottle    Place 1 drop Into the left eye 2 times daily    Adie's pupil, left       zolpidem 10 MG tablet    AMBIEN    90 tablet    One half to one tab QHS prn    Chronic insomnia

## 2018-02-27 NOTE — TELEPHONE ENCOUNTER
Patient reports lower back pain, that intialy started 2 weeks ago.     Sx went away after a little awhile but then tried to do some light weight lifting (15lbs), pain came back. Took IBU and completed stretches but pain did not resolve. Tried Ice/heat and topical pain relief. Also tried Styrophome roller but no change.      LBP 8/10 pain, comes and goes. Feels ocassional nausea d/t back pain. Can walk and stand up straight. Denies numbness or tingling down extremeties. When cough and sneezes LBP will exacerbate.  Also feels pain in testes or that his groin is bruised. Cannot tell if there is any swelling but does not feel it is. Denies change in urine stream or ability to urinate or have a bowel movement.     Funmi ALMEIDA RN, BSN, PHN  Dulac Flex RN

## 2018-02-27 NOTE — PROGRESS NOTES
"  SUBJECTIVE:   Edgardo Mustafa is a 37 year old male who presents to clinic today for the following health issues:    Back Pain     Duration: 2 weeks        Specific cause: none    Description:   Location of pain: low back bilateral, hip bilateral and groin over the past weekend  Progressive  Character of pain: sharp, dull ache, throbbing and constant  Pain radiation: groin  New numbness or weakness in legs, not attributed to pain:  no     Intensity: Currently 7/10, At its worst 9/10    History:     Pain causes nausea due to severity    History of kidney stones    No masses, hernias     No urinary symptoms   Pain interferes with job: YES  History of back problems: no prior back problems  Any previous MRI or X-rays: None  Sees a specialist for back pain:  No  Therapies tried without relief: acetaminophen (Tylenol), ibu, cold, heat, rest and stretch    Alleviating factors:   Improved by: none      Precipitating factors:  Worsened by: Bending, sitting    Functional and Psychosocial Screen (Mallika STarT Back):      Most recent score:    MALLIKA START BACK TOTAL SCORE 2/27/2018   Total Score (all 9) 3       Accompanying Signs & Symptoms:  Risk of Fracture:  None  Risk of Cauda Equina:  None  Risk of Infection:  None  Risk of Cancer:  None  Risk of Ankylosing Spondylitis:  Onset at age <35, male, AND morning back stiffness. no     Work: sales management--office setting.   No new activities.   Exercises regularly.     ROS:  ROS otherwise negative    OBJECTIVE:                                                    /76 (BP Location: Right arm, Cuff Size: Adult Regular)  Pulse 58  Temp 98.3  F (36.8  C) (Oral)  Ht 5' 8\" (1.727 m)  Wt 164 lb (74.4 kg)  SpO2 99%  BMI 24.94 kg/m2  Body mass index is 24.94 kg/(m^2).   GENERAL: alert, no distress  Lumber/Thoracic Spine Exam: Tender:  left para lumbar muscles, right para lumbar muscles  Range of Motion: limited with flexion  Strength: full  Special tests:  positive left " straight leg raise  - male: testicles- no swelling, redness, skin changes; pain over the left testicle. No masses palpated. no inguinal hernias    Diagnostic test results:  No results found for this or any previous visit (from the past 24 hour(s)).     ASSESSMENT/PLAN:                                                    (M54.5) Acute bilateral low back pain without sciatica  (primary encounter diagnosis)  Comment: continue with modification of activities, heat, ibuprofen. norco PRN severe pain. Call if symptoms persist.   Plan: HYDROcodone-acetaminophen (NORCO) 5-325 MG per         tablet            (N50.812) Testicular pain, left  Comment: continue to monitor. Begin ice, elevation and ibuprofen.   Plan:     See Patient Instructions    Jorge Rehman PA-C  [facility]

## 2018-02-27 NOTE — PATIENT INSTRUCTIONS
Low Back Pain            What is low back pain?   Low back pain is pain and stiffness in the lower back. It is one of the most common reasons people miss work.   How does it occur?   Your lower back is called your lumbar spine. It is made up of 5 bones called lumbar vertebrae. In between the vertebrae are shock absorbers called disks. Back pain can occur from an injury to the vertebrae or when a disk bulges or herniates.   Low back pain is usually caused when a ligament or muscle holding a vertebra in its proper position is strained. Vertebrae are bones that make up the spinal column through which the spinal cord passes. When these muscles or ligaments become weak or strained, the spine loses its stability, resulting in pain.   Low back pain can occur if your job involves lifting and carrying heavy objects, or if you spend a lot of time sitting or standing in one position or bending over. It can be caused by a fall or by unusually strenuous exercise. It can be brought on by the tension and stress that cause headaches in some people. It can even be brought on by violent sneezing or coughing.   People who are overweight may have low back pain because of the added stress on their back.   Back pain may occur when the muscles, joints, bones, and connective tissues of the back become inflamed as a result of an infection or an immune system problem. Arthritic disorders as well as some congenital and degenerative conditions may cause back pain.   Back pain accompanied by loss of bladder or bowel control, trouble moving your legs, or numbness or tingling in your arms or legs requires immediate medical treatment.   What are the symptoms?   Symptoms include:   pain in the back or legs   stiffness, spasm, or limited motion   The pain may be constant or may happen only in certain positions. It may get worse when you cough, sneeze, bend, twist, or strain during a bowel movement. The pain may be in only one spot or may  spread to other areas, most commonly down the buttocks and into the back of the thigh.   A low back strain typically does not produce pain past the knee into the calf or foot. Tingling or numbness in the calf or foot may indicate a herniated disk or pinched nerve.   Be sure to see your healthcare provider if:   You have weakness in your leg, especially if you cannot lift your foot, because this may be a sign of nerve damage.   You have new bowel or bladder problems as well as back pain, which may be a sign of severe injury to your spinal cord.   You have pain that gets worse despite treatment.   How is it diagnosed?   Your healthcare provider will review your medical history and examine you. You may have X-rays, an MRI, CT scan, or a bone scan.   How is it treated?   To treat this condition:   Put an ice pack, gel pack, or package of frozen vegetables, wrapped in a cloth on the area every 3 to 4 hours, for up to 20 minutes at a time for the first 2 or 3 days.   Use a heating pad or hot water bottle. Don't let the heating pad get too hot, and don't fall asleep with it. You could get a burn.   Rest in bed on a firm mattress. Often it helps to lie on your back with your knees raised on a pillow. However, some people prefer to lie on their side with their knees bent. It's best to try to stay active, so try not to rest in bed longer than 1 to 2 days.   Take muscle relaxants as recommended by your healthcare provider.   Take an anti-inflammatory such as ibuprofen, or other medicine as directed by your provider. Nonsteroidal anti-inflammatory medicines (NSAIDs) may cause stomach bleeding and other problems. These risks increase with age. Read the label and take as directed. Unless recommended by your healthcare provider, do not take for more than 10 days.   Get a back massage by a trained person.   Wear a belt or corset to support your back.   Do the exercises recommended by your provider. Your provider may also prescribe  physical therapy.   Talk with a counselor, if your back pain is related to tension caused by emotional problems.   When the pain is gone, ask your healthcare provider about starting an exercise program such as the following:   Exercise moderately every day, using stretching and warm-up exercises suggested by your provider or physical therapist.   Exercise vigorously for about 30 minutes 3 times a week by walking, swimming, using a stationary bicycle, or doing low-impact aerobics.   Exercising regularly will not only help your back, it will also help keep you healthier overall.   How long will the effects last?   The effects of back pain last as long as the cause exists or until your body recovers from the strain, usually a day or two but sometimes weeks.   How can I take care of myself?   In addition to the treatment described above, keep in mind these suggestions:   Practice good posture. Stand with your head up, shoulders straight, chest forward, weight balanced evenly on both feet, and pelvis tucked in.   Lose weight if you are overweight   Keep your core muscles strong. These are your abdominal and back muscles.   Sleep without a pillow under your head.   Pain is the best way to  the pace you should set in increasing your activity and exercise. Minor discomfort, stiffness, soreness, and mild aches need not interfere with activity. However, limit your activities temporarily if:   Your symptoms return.   The pain increases when you are more active.   The pain increases within 24 hours after a new or higher level of activity.   When can I return to my normal activities?   Everyone recovers from an injury at a different rate. Return to your activities depends on how soon your back recovers, not by how many days or weeks it has been since your injury has occurred. In general, the longer you have symptoms before you start treatment, the longer it will take to get better. The goal is to return to your normal  activities as soon as is safely possible. If you return too soon you may worsen your injury.   It is important that you have fully recovered from your low back pain before you return to any strenuous activity. You must be able to have the same range of motion that you had before your injury. You must be able to walk and twist without pain.   What can I do to help prevent low back pain?   You can reduce the strain on your back by doing the following:   Don't push with your arms when you move a heavy object. Turn around and push backwards so the strain is taken by your legs.   Whenever you sit, sit in a straight-backed chair and hold your spine against the back of the chair.   Bend your knees and hips and keep your back straight when you lift a heavy object.   Avoid lifting heavy objects higher than your waist.   Hold packages you carry close to your body, with your arms bent.   Use a footrest for one foot when you stand or sit in one spot for a long time. This keeps your back straight.   Bend your knees when you bend over.   Sit close to the pedals when you drive and use your seat belt and a hard backrest or pillow.   Lie on your side with your knees bent when you sleep or rest. It may help to put a pillow between your knees.   Put a pillow under your knees when you sleep on your back.   Raise the foot of the bed 8 inches to discourage sleeping on your stomach unless you have other problems that require that you keep your head elevated.   To rest your back, hold each of these positions for 5?minutes or longer:   Lie on your back, bend your knees, and put pillows under your knees.   Lie on your back on the floor with a pillow under your neck. Bend your knees to a 90-degree angle, and put your lower legs and feet on a chair.   Lie on your back, bend your knees, and bring one knee up to your chest and hold it there. Repeat with the other knee, then bring both knees to your chest. When holding your knee to your chest,  grab your thigh rather than your lower leg to avoid over flexing your knee.     Published by [employer].  This content is reviewed periodically and is subject to change as new health information becomes available. The information is intended to inform and educate and is not a replacement for medical evaluation, advice, diagnosis or treatment by a healthcare professional.   Developed by Daisha Hou RN, [place], and [employer] [facility].   ? 2010 [facility] and/or its affiliates. All Rights Reserved.           Low Back Pain Exercise          Standing hamstring stretch: Put the heel of one leg on a stool about 15 inches high. Keep your leg straight. Lean forward, bending at the hips until you feel a mild stretch in the back of your thigh. Make sure you do not roll your shoulders or bend at the waist when doing this. You want to stretch your leg, not your lower back. Hold the stretch for 15 to 30 seconds. Repeat with each leg 3 times.   Cat and camel: Get down on your hands and knees. Let your stomach sag, allowing your back to curve downward. Hold this position for 5 seconds. Then arch your back and hold for 5 seconds. Do 3 sets of 10.   Quadruped arm and leg raise: Get down on your hands and knees. Pull in your belly button and tighten your abdominal muscles to stiffen your spine. While keeping your abdominals tight, raise one arm and the opposite leg away from you. Hold this position for 5 seconds. Lower your arm and leg slowly and change sides. Do this 10 times on each side.   Pelvic tilt: Lie on your back with your knees bent and your feet flat on the floor. Tighten your abdominal muscles and push your lower back into the floor. Hold this position for 5 seconds, then relax. Do 3 sets of 10.   Partial curl: Lie on your back with your knees bent and your feet flat on the floor. Tighten your stomach muscles. Tuck your chin to your chest. With your hands stretched out in front of you, curl your upper body forward until your  shoulders clear the floor. Hold this position for 3 seconds. Don't hold your breath. It helps to breathe out as you lift your shoulders up. Relax back to the floor. Repeat 10 times. Build to 3 sets of 10. To challenge yourself, clasp your hands behind your head and keep your elbows out to the side.   Gluteal stretch: Lie on your back with both knees bent. Rest the ankle of one leg over the knee of your other leg. Grasp the thigh of the bottom leg and pull toward your chest. You will feel a stretch along the buttocks and possibly along the outside of your hip. Hold the stretch for 15 to 30 seconds. Repeat 3 times with each leg.   Extension exercise:   0. Lie face down on the floor for 5 minutes. If this hurts too much, lie face down with a pillow under your stomach. This should relieve your leg or back pain. When you can lie on your stomach for 5 minutes without a pillow, you can continue with Part B of this exercise.   0. After lying on your stomach for 5 minutes, prop yourself up on your elbows for another 5 minutes. If you can do this without having more leg or buttock pain, you can start doing part C of this exercise.   0. Lie on your stomach with your hands under your shoulders. Then press down on your hands and extend your elbows while keeping your hips flat on the floor. Hold for 1 second and lower yourself to the floor. Do 3 to 5 sets of 10 repetitions. Rest for 1 minute between sets. You should have no pain in your legs when you do this, but it is normal to feel some pain in your lower back.   Do this exercise several times a day.   Side plank: Lie on your side with your legs, hips, and shoulders in a straight line. Prop yourself up onto your forearm so your elbow is directly under your shoulder. Lift your hips off the floor and balance on your forearm and the outside of your foot. Try to hold this position for 15 seconds, then slowly lower your hip to the ground. Switch sides and repeat. Work up to holding  for 1 minute or longer. This exercise can be made easier by starting with your knees and hips flexed toward your chest.   Published by [employer].  This content is reviewed periodically and is subject to change as new health information becomes available. The information is intended to inform and educate and is not a replacement for medical evaluation, advice, diagnosis or treatment by a healthcare professional.   Written by Katie Lindo, MS, PT, and Daisha Burleson PT, [facility], [facility], for [facility]   ? 2010 [facility] and/or its affiliates. All Rights Reserved.         Copyright   [employer] 2011

## 2018-03-05 ENCOUNTER — TELEPHONE (OUTPATIENT)
Dept: PEDIATRICS | Facility: CLINIC | Age: 38
End: 2018-03-05

## 2018-03-05 DIAGNOSIS — M54.50 ACUTE BILATERAL LOW BACK PAIN WITHOUT SCIATICA: Primary | ICD-10-CM

## 2018-03-05 RX ORDER — METHYLPREDNISOLONE 4 MG
TABLET, DOSE PACK ORAL
Qty: 21 TABLET | Refills: 0 | Status: SHIPPED | OUTPATIENT
Start: 2018-03-05 | End: 2018-03-13

## 2018-03-05 NOTE — TELEPHONE ENCOUNTER
I'm sorry to hear things have not improved.   I would like to begin a medrol dose kayla, continue with heat/ice and ibuprofen PRN.     Call in two days with an update.     Jorge Rehman PA-C

## 2018-03-05 NOTE — TELEPHONE ENCOUNTER
Called the pt back.     The pain is a lot more significant than it was last week.  He is rating the pain 9.5/10.  He says he could barely walk this morning.  It is in the same general area, but  more in the lower back.  All of the meds he was given are gone.  They helped a little, but he could still feel pain.      Will forward to Amy for advisal.

## 2018-03-05 NOTE — TELEPHONE ENCOUNTER
Reason for call:  Patient reporting a symptom    Symptom or request: was seen last week for back back and was told to call back with an outcome. The back is much worse all the time. Yesterday and today have been the worse.    Duration (how long have symptoms been present): last week    Have you been treated for this before? Yes    Additional comments: please call the pt back    Phone Number patient can be reached at:    [phone]    Best Time:  any    Can we leave a detailed message on this number:  YES    Call taken on 3/5/2018 at 2:18 PM by Elaine Walker

## 2018-03-13 ENCOUNTER — TELEPHONE (OUTPATIENT)
Dept: PEDIATRICS | Facility: CLINIC | Age: 38
End: 2018-03-13

## 2018-03-13 ENCOUNTER — OFFICE VISIT (OUTPATIENT)
Dept: ORTHOPEDICS | Facility: CLINIC | Age: 38
End: 2018-03-13
Payer: COMMERCIAL

## 2018-03-13 VITALS
SYSTOLIC BLOOD PRESSURE: 115 MMHG | DIASTOLIC BLOOD PRESSURE: 77 MMHG | WEIGHT: 164 LBS | BODY MASS INDEX: 24.86 KG/M2 | HEIGHT: 68 IN

## 2018-03-13 DIAGNOSIS — M54.50 ACUTE MIDLINE LOW BACK PAIN WITHOUT SCIATICA: Primary | ICD-10-CM

## 2018-03-13 DIAGNOSIS — M54.42 ACUTE LEFT-SIDED LOW BACK PAIN WITH LEFT-SIDED SCIATICA: ICD-10-CM

## 2018-03-13 DIAGNOSIS — M54.16 LUMBAR RADICULOPATHY: Primary | ICD-10-CM

## 2018-03-13 PROCEDURE — 99203 OFFICE O/P NEW LOW 30 MIN: CPT | Performed by: FAMILY MEDICINE

## 2018-03-13 RX ORDER — HYDROCODONE BITARTRATE AND ACETAMINOPHEN 5; 325 MG/1; MG/1
1 TABLET ORAL EVERY 4 HOURS PRN
Qty: 15 TABLET | Refills: 0 | Status: SHIPPED | OUTPATIENT
Start: 2018-03-13 | End: 2018-12-10

## 2018-03-13 NOTE — PATIENT INSTRUCTIONS
1. Lumbar radiculopathy    2. Acute left-sided low back pain with left-sided sciatica      Discussed exam - suspect pain related to a disc causing nerve irritation  Recommend Aleve 2 tablets twice a day OR Ibuprofen 800mg (4 tablets) three times a day. Take with food x 7-10 days.  Utilize walk in spine  Physical therapy: [facility] - [phone]  Walk-in Spine Locations  [place]: [phone]  [facility]  [address]  [place]: [phone] [address]  Noa Neville    [place]: [phone]  [address] [place] [address], [address]  Noa Galarza    [place]: [phone]  [address]  Monica Alvarez    Follow up after 2-3 PT sessions  or sooner if needed. Call direct clinic number [[phone]] at any time with questions or concerns.      NEW WEBSITE HAS LAUNCHED  [email or web address]    We care about your feedback and use it to improve our services. Please leave feedback on the Testimonials & Reviews page.

## 2018-03-13 NOTE — TELEPHONE ENCOUNTER
Sure. rx at [facility].   Appointment scheduled with ortho today.   Patient contacted.   Jorge Rehman PA-C

## 2018-03-13 NOTE — MR AVS SNAPSHOT
After Visit Summary   3/13/2018    Edgardo Mustafa    MRN: [id number]           Patient Information     Date Of Birth          1980        Visit Information        Provider Department      3/13/2018 5:20 PM Shawn Meyers,  [facility]        Today's Diagnoses     Lumbar radiculopathy    -  1    Acute left-sided low back pain with left-sided sciatica          Care Instructions    1. Lumbar radiculopathy    2. Acute left-sided low back pain with left-sided sciatica      Discussed exam - suspect pain related to a disc causing nerve irritation  Recommend Aleve 2 tablets twice a day OR Ibuprofen 800mg (4 tablets) three times a day. Take with food x 7-10 days.  Utilize walk in spine  Physical therapy: [facility] - [phone]  Walk-in Spine Locations  [place]: [phone]  [facility]  [address]  Christin Eden    [place]: [phone] [address]  Noa Neville    [place]: [phone]  [address] [place] [address], [address]  Noa Michell    [place]: [phone]  [address]  Monica Alvarez    Follow up after 2-3 PT sessions  or sooner if needed. Call direct clinic number [[phone]] at any time with questions or concerns.      NEW WEBSITE HAS LAUNCHED  [email or web address]    We care about your feedback and use it to improve our services. Please leave feedback on the Testimonials & Reviews page.                Follow-ups after your visit        Additional Services     [facility] PT, HAND, AND CHIROPRACTIC REFERRAL       **This order will print in the [facility] Scheduling Office**    Physical Therapy, Hand Therapy and Chiropractic Care are available through:    *[facility]  *[facility]  *[facility]    Call one number to schedule at any of the above locations: [phone].    Your provider has referred you to: Physical Therapy  at [facility] or [facility]    Indication/Reason for Referral: Low Back Pain - L lumbar radic, S1 pattern  Onset of Illness: see chart  Therapy Orders: Evaluate and Treat  Special Programs: None  Special Request: MDT PT please - Christin Eden ([place]), Noa Galarza ([place]), Katja Galvin ([place]), Noa Neville ([place]) or Emigdio Perez ([place])    Mallika Tena      Additional Comments for the Therapist or Chiropractor: Formal physical therapy - specific exercises to include centralization with flexion/extension activities with mobilization/manipulation and core stabilization with use of modalities (ie ice, ultrasound, electrical stimulation, etc.) as needed with home exercise prescription.    Please be aware that coverage of these services is subject to the terms and limitations of your health insurance plan.  Call member services at your health plan with any benefit or coverage questions.      Please bring the following to your appointment:    *Your personal calendar for scheduling future appointments  *Comfortable clothing                  Who to contact     If you have questions or need follow up information about today's clinic visit or your schedule please contact [facility] directly at [phone].  Normal or non-critical lab and imaging results will be communicated to you by [employer], letter or phone within 4 business days after the clinic has received the results. If you do not hear from us within 7 days, please contact the clinic through [employer] or phone. If you have a critical or abnormal lab result, we will notify you by phone as soon as possible.  Submit refill requests through [employer] or call your pharmacy and they will forward the refill request to us. Please allow 3 business days for your refill to be completed.          Additional Information About Your Visit        [employer] Information     [employer] lets you send messages to your doctor, view your test results, renew your  "prescriptions, schedule appointments and more. To sign up, go to [email or web address] . Click on \"Log in\" on the left side of the screen, which will take you to the Welcome page. Then click on \"Sign up Now\" on the right side of the page.     You will be asked to enter the access code listed below, as well as some personal information. Please follow the directions to create your username and password.     Your access code is: [email or web address]  Expires: 2018  4:20 PM     Your access code will  in 90 days. If you need help or a new code, please call your [facility] clinic or [phone].        Care EveryWhere ID     This is your Care EveryWhere ID. This could be used by other organizations to access your [facility] medical records  [id number]        Your Vitals Were     Height BMI (Body Mass Index)                5' 8\" (1.727 m) 24.94 kg/m2           Blood Pressure from Last 3 Encounters:   18 115/77   18 110/76   10/17/17 100/60    Weight from Last 3 Encounters:   18 164 lb (74.4 kg)   18 164 lb (74.4 kg)   10/17/17 155 lb (70.3 kg)              We Performed the Following     [facility] PT, HAND, AND CHIROPRACTIC REFERRAL          Today's Medication Changes          These changes are accurate as of 3/13/18  6:07 PM.  If you have any questions, ask your nurse or doctor.               These medicines have changed or have updated prescriptions.        Dose/Directions    HYDROcodone-acetaminophen 5-325 MG per tablet   Commonly known as:  NORCO   This may have changed:    - when to take this  - reasons to take this   Used for:  Acute midline low back pain without sciatica   Changed by:  Mesfin Rosales MD        Dose:  1 tablet   Take 1 tablet by mouth every 4 hours as needed for pain   Quantity:  15 tablet   Refills:  0         Stop taking these medicines if you haven't already. Please contact your care team if you have questions.     methylPREDNISolone 4 MG tablet   Commonly known as:  " MEDROL DOSEPAK   Stopped by:  Shawn Meyers, DO                Where to get your medicines      Some of these will need a paper prescription and others can be bought over the counter.  Ask your nurse if you have questions.     Bring a paper prescription for each of these medications     HYDROcodone-acetaminophen 5-325 MG per tablet                Primary Care Provider Office Phone # Fax #    Mesfin Rosales [phone] [address] [place] [address]        Equal Access to Services     [facility]: Hadii aad ku hadasho Soomaali, waaxda luqadaha, qaybta kaalmada adeegyada, waxay idiin hayaan adeeg kharash lajose . So [facility] [phone].    ATENCIÓN: Si habla español, tiene a montemayor disposición servicios gratuitos de asistencia lingüística. [facility] [phone].    We comply with applicable federal civil rights laws and [place] laws. We do not discriminate on the basis of race, color, national origin, age, disability, sex, sexual orientation, or gender identity.            Thank you!     Thank you for choosing [facility]  for your care. Our goal is always to provide you with excellent care. Hearing back from our patients is one way we can continue to improve our services. Please take a few minutes to complete the written survey that you may receive in the mail after your visit with us. Thank you!             Your Updated Medication List - Protect others around you: Learn how to safely use, store and throw away your medicines at www.disposemymeds.org.          This list is accurate as of 3/13/18  6:07 PM.  Always use your most recent med list.                   Brand Name Dispense Instructions for use Diagnosis    aluminum chloride 20 % external solution    DRYSOL    60 mL    To improve effect, cover area of application with plastic wrap,  and wash area in morning. As sweating improves, decrease use to 1-2 times weekly.    Generalized hyperhidrosis        FLUoxetine 10 MG capsule    PROzac    90 capsule    Take 1 capsule (10 mg) by mouth daily    MARU (generalized anxiety disorder)       HYDROcodone-acetaminophen 5-325 MG per tablet    NORCO    15 tablet    Take 1 tablet by mouth every 4 hours as needed for pain    Acute midline low back pain without sciatica       pilocarpine 1 % ophthalmic solution    PILOCAR    1 Bottle    Place 1 drop Into the left eye 2 times daily    Adie's pupil, left       zolpidem 10 MG tablet    AMBIEN    90 tablet    One half to one tab QHS prn    Chronic insomnia

## 2018-03-13 NOTE — TELEPHONE ENCOUNTER
Patient is asking if he could get an RX for pain control until he sees Ortho?  MITZI Tomlinson RN

## 2018-03-13 NOTE — LETTER
3/13/2018         RE: Edgardo Mustafa  [address] [place] [address]        Dear Colleague,    Thank you for referring your patient, Edgardo Mustafa, to the [facility]. Please see a copy of my visit note below.    ASSESSMENT & PLAN    1. Lumbar radiculopathy    2. Acute left-sided low back pain with left-sided sciatica      Discussed exam - suspect discogenic pain  Recommend Aleve 2 tablets twice a day OR Ibuprofen 800mg (4 tablets) three times a day. Take with food x 7-10 days.  Utilize walk in spine  Physical therapy: [facility] - [phone]    Follow up after 2-3 PT sessions  or sooner if needed. Call direct clinic number [[phone]] at any time with questions or concerns.    -----    SUBJECTIVE  Edgardo Mustafa is a/an 37 year old male who is seen in consultation at the request of  Jorge Rehman PA-C for evaluation of acute low back pain. The patient is seen by themselves.    Onset: 2.5 week(s) ago. Reports insidious onset without acute precipitating event.  Location of Pain: bilateral low back pain  Rating of Pain at worst: 10/10  Rating of Pain Currently: 7/10  Worsened by: sudden movements, bending and lifting  Better with: Prednisone (moderate temporary relief)  Treatments tried: ice, heat, Ibuprofen and other medications: Hydrocodone/Acetaminophen (Vicodin/Norco) and Prednisone (Medrol)  Quality: aching, cramping  Red flags: Weakness: No, bowel/bladder loss: No, foot drop: No  Associated symptoms: intermittent numbness in left posterior knee, has had intermittent aching pain in the backs of both thighs (not currently having issues), has had some radiation of pain to the testicles as well  Orthopedic history: NO  Relevant surgical history: NO  Patient Social History: works as a     Patient's past medical, surgical, social, and family histories were reviewed today and no changes are noted.    REVIEW OF  "SYSTEMS:  10 point ROS is negative other than symptoms noted above in HPI, Past Medical History or as stated below  Constitutional: NEGATIVE for fever, chills, change in weight  Skin: NEGATIVE for worrisome rashes, moles or lesions  GI/: NEGATIVE for bowel or bladder changes  Neuro: NEGATIVE for weakness, dizziness or paresthesias    OBJECTIVE:  /77  Ht 5' 8\" (1.727 m)  Wt 164 lb (74.4 kg)  BMI 24.94 kg/m2   General: healthy, alert and in no distress  HEENT: no scleral icterus or conjunctival erythema  Skin: no suspicious lesions or rash. No jaundice.  CV: no pedal edema  Resp: normal respiratory effort without conversational dyspnea   Psych: normal mood and affect  Gait: normal steady gait with appropriate coordination and balance  Neuro: normal light touch sensory exam of the bilateral lower extremities.  DTR's 2+ right patella, unable to elicit left patella reflex and 2+ achilles bilaterally.  MSK:  THORACIC/LUMBAR SPINE  Inspection:    No gross deformity/asymmetry  Palpation:    Tender about the lumbar facet joints, left sciatic notch and right sciatic notch. Otherwise remainder of landmarks are nontender.  Range of Motion:     Lumbar flexion limited by pain    Lumbar extension full  Strength:    able to heel walk, able to toe walk, quadriceps 5-/5, hamstrings 5/5, gastrocsoleus 5/5, tibialis anterior 5/5, extensor hallicus longus 5/5  Special Tests:    Positive: slump test (left) - S1 dermatome    BILATERAL HIP  Inspection:    No obvious deformity or asymmetry, pelvis level  Active Range of Motion:     Flexion limited by pain, IR full, ER  full  Strength:    Flexion 5-/5, adduction 5/5, abduction 5/5  Special Tests:    Negative: Logroll, anterior impingement (FADIR)    Independent visualization of the below image:  None indicated at this time    Patient's conditions were thoroughly discussed during today's visit with greater than 50% of the visit spent counseling the patient with total time spent " face-to-face with the patient being 25 minutes.    Shawn Meyers DO [facility]      Again, thank you for allowing me to participate in the care of your patient.        Sincerely,        Shawn Meyers DO

## 2018-03-13 NOTE — PROGRESS NOTES
ASSESSMENT & PLAN    1. Lumbar radiculopathy    2. Acute left-sided low back pain with left-sided sciatica      Discussed exam - suspect discogenic pain  Recommend Aleve 2 tablets twice a day OR Ibuprofen 800mg (4 tablets) three times a day. Take with food x 7-10 days.  Utilize walk in spine  Physical therapy: [facility] - [phone]    Follow up after 2-3 PT sessions  or sooner if needed. Call direct clinic number [[phone]] at any time with questions or concerns.    -----    SUBJECTIVE  Edgardo Mustafa is a/an 37 year old male who is seen in consultation at the request of  Jorge Rehman PA-C for evaluation of acute low back pain. The patient is seen by themselves.    Onset: 2.5 week(s) ago. Reports insidious onset without acute precipitating event.  Location of Pain: bilateral low back pain  Rating of Pain at worst: 10/10  Rating of Pain Currently: 7/10  Worsened by: sudden movements, bending and lifting  Better with: Prednisone (moderate temporary relief)  Treatments tried: ice, heat, Ibuprofen and other medications: Hydrocodone/Acetaminophen (Vicodin/Norco) and Prednisone (Medrol)  Quality: aching, cramping  Red flags: Weakness: No, bowel/bladder loss: No, foot drop: No  Associated symptoms: intermittent numbness in left posterior knee, has had intermittent aching pain in the backs of both thighs (not currently having issues), has had some radiation of pain to the testicles as well  Orthopedic history: NO  Relevant surgical history: NO  Patient Social History: works as a     Patient's past medical, surgical, social, and family histories were reviewed today and no changes are noted.    REVIEW OF SYSTEMS:  10 point ROS is negative other than symptoms noted above in HPI, Past Medical History or as stated below  Constitutional: NEGATIVE for fever, chills, change in weight  Skin: NEGATIVE for worrisome rashes, moles or lesions  GI/: NEGATIVE for bowel or  "bladder changes  Neuro: NEGATIVE for weakness, dizziness or paresthesias    OBJECTIVE:  /77  Ht 5' 8\" (1.727 m)  Wt 164 lb (74.4 kg)  BMI 24.94 kg/m2   General: healthy, alert and in no distress  HEENT: no scleral icterus or conjunctival erythema  Skin: no suspicious lesions or rash. No jaundice.  CV: no pedal edema  Resp: normal respiratory effort without conversational dyspnea   Psych: normal mood and affect  Gait: normal steady gait with appropriate coordination and balance  Neuro: normal light touch sensory exam of the bilateral lower extremities.  DTR's 2+ right patella, unable to elicit left patella reflex and 2+ achilles bilaterally.  MSK:  THORACIC/LUMBAR SPINE  Inspection:    No gross deformity/asymmetry  Palpation:    Tender about the lumbar facet joints, left sciatic notch and right sciatic notch. Otherwise remainder of landmarks are nontender.  Range of Motion:     Lumbar flexion limited by pain    Lumbar extension full  Strength:    able to heel walk, able to toe walk, quadriceps 5-/5, hamstrings 5/5, gastrocsoleus 5/5, tibialis anterior 5/5, extensor hallicus longus 5/5  Special Tests:    Positive: slump test (left) - S1 dermatome    BILATERAL HIP  Inspection:    No obvious deformity or asymmetry, pelvis level  Active Range of Motion:     Flexion limited by pain, IR full, ER  full  Strength:    Flexion 5-/5, adduction 5/5, abduction 5/5  Special Tests:    Negative: Logroll, anterior impingement (FADIR)    Independent visualization of the below image:  None indicated at this time    Patient's conditions were thoroughly discussed during today's visit with greater than 50% of the visit spent counseling the patient with total time spent face-to-face with the patient being 25 minutes.    Shawn Meyers, DO [facility]    "

## 2018-03-13 NOTE — TELEPHONE ENCOUNTER
"Patient completed steroid pack on Sunday.  Low back pain improved some on the steroids but has increased considerably since stopping the steoids.  Pain now is in mid lower back.  Sometimes both legs feel like \"they are asleep\".  No weakness in legs.  Has had pain in bilateral calves at night also.    Referral to spine specialist?  MITZI Tomlinson RN    "

## 2018-03-14 ENCOUNTER — THERAPY VISIT (OUTPATIENT)
Dept: PHYSICAL THERAPY | Facility: CLINIC | Age: 38
End: 2018-03-14
Payer: COMMERCIAL

## 2018-03-14 DIAGNOSIS — M54.59 MECHANICAL LOW BACK PAIN: Primary | ICD-10-CM

## 2018-03-14 PROCEDURE — 97161 PT EVAL LOW COMPLEX 20 MIN: CPT | Mod: GP | Performed by: PHYSICAL THERAPIST

## 2018-03-14 PROCEDURE — 97110 THERAPEUTIC EXERCISES: CPT | Mod: GP | Performed by: PHYSICAL THERAPIST

## 2018-03-14 PROCEDURE — 97112 NEUROMUSCULAR REEDUCATION: CPT | Mod: GP | Performed by: PHYSICAL THERAPIST

## 2018-03-14 NOTE — MR AVS SNAPSHOT
"              After Visit Summary   3/14/2018    Edgardo Mustafa    MRN: [id number]           Patient Information     Date Of Birth          1980        Visit Information        Provider Department      3/14/2018 4:55 PM Christin Eden, PT [facility]        Today's Diagnoses     Mechanical low back pain    -  1       Follow-ups after your visit        Your next 10 appointments already scheduled     Mar 23, 2018  9:40 AM CDT   [facility] ([facility]  )    [address] [address] [address]   [phone]              Who to contact     If you have questions or need follow up information about today's clinic visit or your schedule please contact [facility] directly at [phone].  Normal or non-critical lab and imaging results will be communicated to you by [employer], letter or phone within 4 business days after the clinic has received the results. If you do not hear from us within 7 days, please contact the clinic through MyChart or phone. If you have a critical or abnormal lab result, we will notify you by phone as soon as possible.  Submit refill requests through [facility] or call your pharmacy and they will forward the refill request to us. Please allow 3 business days for your refill to be completed.          Additional Information About Your Visit        MyChart Information     [facility] lets you send messages to your doctor, view your test results, renew your prescriptions, schedule appointments and more. To sign up, go to [email or web address] . Click on \"Log in\" on the left side of the screen, which will take you to the Welcome page. Then click on \"Sign up Now\" on the right side of the page.     You will be asked to enter the access code listed below, as well as some personal information. Please follow the directions to create your username and password.     Your access code is: [email or web address]  Expires: 5/28/2018  4:20 " PM     Your access code will  in 90 days. If you need help or a new code, please call your [place] clinic or [phone].        Care EveryWhere ID     This is your Care EveryWhere ID. This could be used by other organizations to access your [place] medical records  [id number]         Blood Pressure from Last 3 Encounters:   18 115/77   18 110/76   10/17/17 100/60    Weight from Last 3 Encounters:   18 74.4 kg (164 lb)   18 74.4 kg (164 lb)   10/17/17 70.3 kg (155 lb)              We Performed the Following     HC PT EVAL, LOW COMPLEXITY     [facility] INITIAL EVAL REPORT     NEUROMUSCULAR RE-EDUCATION     THERAPEUTIC EXERCISES        Primary Care Provider Office Phone # Fax #    Mesfin Rosales [phone] [address] [place] [address]        Equal Access to Services     [facility]: Hadii aad ku hadasho Soomaali, waaxda luqadaha, qaybta kaalmada adeegyada, waxay idiin hayaan adeeg abdias marien . So [facility] [phone].    ATENCIÓN: Si habla español, tiene a montemayor disposición servicios gratuitos de asistencia lingüística. Angelinaame al [phone].    We comply with applicable federal civil rights laws and [place] laws. We do not discriminate on the basis of race, color, national origin, age, disability, sex, sexual orientation, or gender identity.            Thank you!     Thank you for choosing [facility]  for your care. Our goal is always to provide you with excellent care. Hearing back from our patients is one way we can continue to improve our services. Please take a few minutes to complete the written survey that you may receive in the mail after your visit with us. Thank you!             Your Updated Medication List - Protect others around you: Learn how to safely use, store and throw away your medicines at www.disposemymeds.org.          This list is accurate as of 3/14/18 11:59 PM.  Always use your most recent med list.                    Brand Name Dispense Instructions for use Diagnosis    aluminum chloride 20 % external solution    DRYSOL    60 mL    To improve effect, cover area of application with plastic wrap,  and wash area in morning. As sweating improves, decrease use to 1-2 times weekly.    Generalized hyperhidrosis       FLUoxetine 10 MG capsule    PROzac    90 capsule    Take 1 capsule (10 mg) by mouth daily    MARU (generalized anxiety disorder)       HYDROcodone-acetaminophen 5-325 MG per tablet    NORCO    15 tablet    Take 1 tablet by mouth every 4 hours as needed for pain    Acute midline low back pain without sciatica       pilocarpine 1 % ophthalmic solution    PILOCAR    1 Bottle    Place 1 drop Into the left eye 2 times daily    Adie's pupil, left       zolpidem 10 MG tablet    AMBIEN    90 tablet    One half to one tab QHS prn    Chronic insomnia

## 2018-03-14 NOTE — PROGRESS NOTES
"[facility] Initial Evaluation -- Lumbar    Date: March 14, 2018  Edgardo Mustafa is a 37 year old male with a lumbar condition.   Referral: Dr. 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     Symptoms (improving/unchanging/worsening):uchanging .     Symptoms commenced as a result of: no apparent reason   Condition occurred in the following environment:   home     Symptoms at onset (back/thigh/leg): back  Constant symptoms (back/thigh/leg): back  Intermittent symptoms (back/thigh/leg): L thigh    Symptoms are made worse with the following: Always Bending, Always Sitting, Always Rising and Time of day - No effect   Symptoms are made better with the following: Always and Sometimes Walking and Always On the move    Disturbed sleep (yes/no):  yes Sleeping postures (prone/sup/side R/L): L side    Previous episodes (0/1-5/6-10/11+): 0 Year of first episode: n/a    Previous history: no previous history  Previous treatments: medrol pack was helpful initially but overall NE      Specific Questions:  Cough/Sneeze/Strain (pos/neg): positive  Bowel/Bladder (normal/abnormal): normal  Gait (normal/abnormal): abnormal  Medications (nil/NSAIDS/analg/steroids/anticoag/other):  NSAIDS and Steroids  Medical allergies:  none  General health (excellent/good/fair/poor):  excellent  Pertinent medical history:  None  Imaging (None/Xray/MRI/Other):  none  Recent or major surgery (yes/no):  none  Night pain " "(yes/no): yes  Accidents (yes/no): no  Unexplained weight loss (yes/no): none  Barriers at home: no  Other red flags: none    EXAMINATION    Posture:   Sitting (good/fair/poor): poor  Standing (good/fair/poor):fair  Lordosis (red/acc/normal): red  Correction of posture (better/worse/no effect): better    Lateral Shift (right/left/nil): nil  Relevant (yes/no):  no  Other Observations: none    Neurological:    Motor deficit:  n/a  Reflexes:  n/a  Sensory deficit:  n/a  Dural signs:  n/a    Movement Loss:   Loi Mod Min Nil Pain   Flexion  x x  pdm   Extension   x  erpressure   Side Gliding R   x x erp   Side Gliding L    x      Test Movements:   During: produces, abolishes, increases, decreases, no effect, centralizing, peripheralizing   After: better, worse, no better, no worse, no effect, centralized, peripheralized    Pretest symptoms standing:    Symptoms During Symptoms After ROM increased ROM decreased No Effect   FIS        Rep FIS        EIS        Rep EIS        Pretest symptoms lying: \"pressure front of hips\"    Symptoms During Symptoms After ROM increased ROM decreased No Effect   LIU        Rep LIU        EIL No Effect    No Effect         Rep EIL Decreases    Better    x     If required, pretest symptoms:    Symptoms During Symptoms After ROM increased ROM decreased No Effect   SGIS - R        Rep SGIS - R        SGIS - L        Rep SGIS - L          Static Tests:  Sitting slouched:    Sitting erect:    Standing slouched   Standing erect:    Lying prone in extension:   Long sitting:      Other Tests:     Provisional Classification:  Derangement - Bilateral, symmetrical, symptoms above knee    Principle of Management:  Education:  DP, posture, \"cut knuckle\" analogy, dose of exercise   Equipment provided:  Lumbar roll  Mechanical therapy (Y/N):  Y   Extension principle:  Rep EIL + sag x 10/2 hrs  Lateral Principle:   Flexion principle:    Other:      ASSESSMENT/PLAN:    Patient is a 37 year old male with " lumbar complaints.    Patient has the following significant findings with corresponding treatment plan.                Diagnosis 1:  Mechanical low back pain  Pain -  manual therapy, self management, education, directional preference exercise and home program  Decreased ROM/flexibility - manual therapy and therapeutic exercise  Decreased function - therapeutic activities  Impaired posture - neuro re-education    Therapy Evaluation Codes:   1) History comprised of:   Personal factors that impact the plan of care:      None.    Comorbidity factors that impact the plan of care are:      None.     Medications impacting care: Anti-inflammatory.  2) Examination of Body Systems comprised of:   Body structures and functions that impact the plan of care:      Lumbar spine.   Activity limitations that impact the plan of care are:      Bending and Sitting.  3) Clinical presentation characteristics are:   Stable/Uncomplicated.  4) Decision-Making    Low complexity using standardized patient assessment instrument and/or measureable assessment of functional outcome.  Cumulative Therapy Evaluation is: Low complexity.    Previous and current functional limitations:  (See Goal Flow Sheet for this information)    Short term and Long term goals: (See Goal Flow Sheet for this information)     Communication ability:  Patient appears to be able to clearly communicate and understand verbal and written communication and follow directions correctly.  Treatment Explanation - The following has been discussed with the patient:   RX ordered/plan of care  Anticipated outcomes  Possible risks and side effects  This patient would benefit from PT intervention to resume normal activities.   Rehab potential is good.    Frequency:  1 X week, once daily  Duration:  for 6 weeks  Discharge Plan:  Achieve all LTG.  Independent in home treatment program.  Reach maximal therapeutic benefit.    Please refer to the daily flowsheet for treatment today, total  treatment time and time spent performing 1:1 timed codes.

## 2018-03-14 NOTE — PROGRESS NOTES
[facility] Initial Evaluation -- Lumbar    Date: March 14, 2018  Edgardo Mustafa is a 37 year old male with a *** condition.   Referral: ***  Work mechanical stresses:  ***  Employment status:  ***  Leisure mechanical stresses: ***  Functional disability score (ASHA/STarT Back):  ***  VAS score (0-10): ***  Patient goals/expectations:  ***    HISTORY:    Present symptoms: ***  Pain quality (sharp/shooting/stabbing/aching/burning/cramping):  ***   Paresthesia (yes/no):  ***    Present since (onset date): ***.     Symptoms (improving/unchanging/worsening):  ***.     Symptoms commenced as a result of: ***   Condition occurred in the following environment:   ***     Symptoms at onset (back/thigh/leg): ***  Constant symptoms (back/thigh/leg): ***  Intermittent symptoms (back/thigh/leg): ***    Symptoms are made worse with the following: {[facility] Lumbar Better/Worse:194214}   Symptoms are made better with the following: {JUNE Lumbar Better/Worse:341545}    Disturbed sleep (yes/no):  *** Sleeping postures (prone/sup/side R/L): ***    Previous episodes (0/1-5/6-10/11+): *** Year of first episode: ***    Previous history: ***  Previous treatments: ***      Specific Questions:  Cough/Sneeze/Strain (pos/neg): ***  Bowel/Bladder (normal/abnormal): ***  Gait (normal/abnormal): ***  Medications (nil/NSAIDS/analg/steroids/anticoag/other):  {JUN[facility] Medications:380871}  Medical allergies:  ***  General health (excellent/good/fair/poor):  ***  Pertinent medical history:  {JUNE Past Medical History:060262}  Imaging (None/Xray/MRI/Other):  ***  Recent or major surgery (yes/no):  ***  Night pain (yes/no): ***  Accidents (yes/no): ***  Unexplained weight loss (yes/no): ***  Barriers at home: ***  Other red flags: ***    EXAMINATION    Posture:   Sitting (good/fair/poor): ***  Standing (good/fair/poor):***  Lordosis (red/acc/normal): ***  Correction of posture (better/worse/no effect): ***    Lateral Shift  "(right/left/nil): ***  Relevant (yes/no):  ***  Other Observations: ***    Neurological:    Motor deficit:  ***  Reflexes:  ***  Sensory deficit:  ***  Dural signs:  ***    Movement Loss:   Loi Mod Min Nil Pain   Flexion     ***   Extension     ***   Side Gliding R     ***   Side Gliding L     ***     Test Movements:   During: produces, abolishes, increases, decreases, no effect, centralizing, peripheralizing   After: better, worse, no better, no worse, no effect, centralized, peripheralized    Pretest symptoms standing: ***   Symptoms During Symptoms After ROM increased ROM decreased No Effect   FIS {JUNE MDT During Testin::\" \"} {JUNE MDT After Testin::\" \"}      Rep FIS {JUNE MDT During Testin::\" \"} {JUNE MDT After Testin::\" \"}      EIS {JUNE MDT During Testin::\" \"} {JUNE MDT After Testin::\" \"}      Rep EIS {JUNE MDT During Testin::\" \"} {JUNE MDT After Testin::\" \"}      Pretest symptoms lying: ***    Symptoms During Symptoms After ROM increased ROM decreased No Effect   LIU {JUNE MDT During Testin::\" \"} {JUNE MDT After Testin::\" \"}      Rep LIU {JUNE MDT During Testin::\" \"} {JUNE MDT After Testin::\" \"}      EIL {JUNE MDT During Testin::\" \"} {JUNE MDT After Testin::\" \"}      Rep EIL {JUNE MDT During Testin::\" \"} {JUNE MDT After Testin::\" \"}      If required, pretest symptoms: ***   Symptoms During Symptoms After ROM increased ROM decreased No Effect   SGIS - R {JNUE MDT During Testin::\" \"} {JUNE MDT After Testin::\" \"}      Rep SGIS - R {JUNE MDT During Testin::\" \"} {JUNE MDT After Testin::\" \"}      SGIS - L {JUNE MDT During Testin::\" \"} {JUNE MDT After Testin::\" \"}      Rep SGIS - L {JUNE MDT During Testin::\" \"} {JUNE MDT After Testin::\" \"}        Static Tests:  Sitting slouched:  ***  Sitting erect:  ***  Standing slouched ***  Standing erect:  " ***  Lying prone in extension:  *** Long sitting:  ***    Other Tests: ***    Provisional Classification:  {kimmie mdt lumbar classification:909627}    Principle of Management:  Education:  ***   Equipment provided:  ***  Mechanical therapy (Y/N):  ***   Extension principle:  ***  Lateral Principle:  ***  Flexion principle:  ***  Other:  ***    ASSESSMENT/PLAN:    {REHAB NOTES:218148}

## 2018-03-15 PROBLEM — M54.59 MECHANICAL LOW BACK PAIN: Status: ACTIVE | Noted: 2018-03-15

## 2018-04-04 ENCOUNTER — THERAPY VISIT (OUTPATIENT)
Dept: PHYSICAL THERAPY | Facility: CLINIC | Age: 38
End: 2018-04-04
Payer: COMMERCIAL

## 2018-04-04 DIAGNOSIS — M54.59 MECHANICAL LOW BACK PAIN: ICD-10-CM

## 2018-04-04 PROCEDURE — 97110 THERAPEUTIC EXERCISES: CPT | Mod: GP | Performed by: PHYSICAL THERAPIST

## 2018-04-04 PROCEDURE — 97140 MANUAL THERAPY 1/> REGIONS: CPT | Mod: GP | Performed by: PHYSICAL THERAPIST

## 2018-05-08 DIAGNOSIS — F51.04 CHRONIC INSOMNIA: ICD-10-CM

## 2018-05-08 NOTE — TELEPHONE ENCOUNTER
Zolpidem 10 mg      Last Written Prescription Date:  02/12/18  Last Fill Quantity: 90,   # refills: 0  Last Office Visit: 10/17/17  Future Office visit:       Routing refill request to provider for review/approval because:  Drug not on the [facility], UMP or  Health refill protocol or controlled substance    RX [place] ([place]) reviewed:  reviewed- no concerns  Last dispensed-02/12/18    [place]:  [email or web address]  MITZI Tomlinson RN

## 2018-05-09 RX ORDER — ZOLPIDEM TARTRATE 10 MG/1
TABLET ORAL
Qty: 90 TABLET | Refills: 1 | Status: SHIPPED | OUTPATIENT
Start: 2018-05-09 | End: 2018-10-10

## 2018-05-21 ENCOUNTER — MYC MEDICAL ADVICE (OUTPATIENT)
Dept: PEDIATRICS | Facility: CLINIC | Age: 38
End: 2018-05-21

## 2018-07-06 ENCOUNTER — HOSPITAL ENCOUNTER (OUTPATIENT)
Dept: MRI IMAGING | Facility: CLINIC | Age: 38
Discharge: HOME | End: 2018-07-06
Attending: FAMILY MEDICINE | Admitting: FAMILY MEDICINE
Payer: COMMERCIAL

## 2018-07-06 ENCOUNTER — OFFICE VISIT (OUTPATIENT)
Dept: ORTHOPEDICS | Facility: CLINIC | Age: 38
End: 2018-07-06
Payer: COMMERCIAL

## 2018-07-06 VITALS
SYSTOLIC BLOOD PRESSURE: 108 MMHG | DIASTOLIC BLOOD PRESSURE: 74 MMHG | BODY MASS INDEX: 25.01 KG/M2 | WEIGHT: 165 LBS | HEIGHT: 68 IN

## 2018-07-06 DIAGNOSIS — M54.16 RIGHT LUMBAR RADICULOPATHY: ICD-10-CM

## 2018-07-06 DIAGNOSIS — G89.29 CHRONIC RIGHT-SIDED LOW BACK PAIN WITH RIGHT-SIDED SCIATICA: ICD-10-CM

## 2018-07-06 DIAGNOSIS — M54.41 CHRONIC RIGHT-SIDED LOW BACK PAIN WITH RIGHT-SIDED SCIATICA: ICD-10-CM

## 2018-07-06 DIAGNOSIS — M62.838 MUSCLE SPASM: ICD-10-CM

## 2018-07-06 DIAGNOSIS — M54.16 RIGHT LUMBAR RADICULOPATHY: Primary | ICD-10-CM

## 2018-07-06 PROCEDURE — 72148 MRI LUMBAR SPINE W/O DYE: CPT

## 2018-07-06 PROCEDURE — 99214 OFFICE O/P EST MOD 30 MIN: CPT | Performed by: FAMILY MEDICINE

## 2018-07-06 RX ORDER — HYDROCODONE BITARTRATE AND ACETAMINOPHEN 5; 325 MG/1; MG/1
1 TABLET ORAL
Qty: 10 TABLET | Refills: 0 | Status: SHIPPED | OUTPATIENT
Start: 2018-07-06 | End: 2018-12-10

## 2018-07-06 RX ORDER — CYCLOBENZAPRINE HCL 10 MG
10 TABLET ORAL
Qty: 15 TABLET | Refills: 0 | Status: SHIPPED | OUTPATIENT
Start: 2018-07-06 | End: 2019-02-25

## 2018-07-06 NOTE — PATIENT INSTRUCTIONS
"1. Right lumbar radiculopathy    2. Chronic right-sided low back pain with right-sided sciatica      Given worsening pain recommend imaging  MRI of your lower back has been ordered. Will check for same day otherwise, schedule with Shanta ([phone]).   Rx for Flexeril and Norco. Use at night. No alcohol or driving while you're taking these.  Don't recommend taking together.    Please follow-up with me 2 days after you have completed your MRI with an e-visit.  You can find this option only when you log-in on a computer and is available on the [employer] homepage.  Select \"Online Care\" and \"start an E-visit\" (see screen shots below).   Select \"other\" as the e-visit type, indicate it's for a follow-up in the description and select yes that symptoms have been present before.                  "

## 2018-07-06 NOTE — LETTER
"    7/6/2018         RE: Edgardo Mustafa  [address] [place] [address]        Dear Colleague,    Thank you for referring your patient, Edgardo Mustafa, to the [facility]. Please see a copy of my visit note below.    ASSESSMENT & PLAN    1. Right lumbar radiculopathy    2. Chronic right-sided low back pain with right-sided sciatica      Worsening pain recommend imaging  MRI of your lower back has been ordered. Will check for same day otherwise, schedule with Shanta ([phone]).   Rx for Flexeril and Norco. Use at night. No alcohol or driving while you're taking these.  Don't recommend taking together.    Please follow-up with me 2 days after you have completed your MRI with an e-visit.     -----    SUBJECTIVE:  Edgardo Mustafa is a 37 year old male who is seen in follow-up for low back.They were last seen 3/13/2018.     Since their last visit reports worsening pain. He had some improvement and now the back pain is acting up again. He is having pain in the low back, radiates to right hip. Also notes numbness in the right lateral ankle/foot. They indicate that their current pain level is 9/10, constant, intolerable. They have tried ice, heat, ibuprofen, home exercises and physical therapy (3 visits).      The patient is seen by themselves.    Patient's past medical, surgical, social, and family histories were reviewed today and no changes are noted.    REVIEW OF SYSTEMS:  Constitutional: NEGATIVE for fever, chills, change in weight  Skin: NEGATIVE for worrisome rashes, moles or lesions  GI/: NEGATIVE for bowel or bladder changes  Neuro: NEGATIVE for weakness, dizziness or paresthesias    OBJECTIVE:  /74 (BP Location: Right arm, Patient Position: Chair, Cuff Size: Adult Large)  Ht 5' 8\" (1.727 m)  Wt 165 lb (74.8 kg)  BMI 25.09 kg/m2   General: healthy, alert and in moderate distress 2/2 pain  HEENT: no scleral icterus or conjunctival erythema  Skin: no " suspicious lesions or rash. No jaundice.  CV: no pedal edema  Resp: normal respiratory effort without conversational dyspnea   Psych: normal mood and affect  Gait: slow to rise from chair, antalgic gait, appropriate coordination and balance  Neuro: normal light touch sensory exam of the extremities.    MSK:  THORACIC/LUMBAR SPINE  Inspection:    Superificial redness overlying lumbo sacral region, likely related to heating/thermal burn.   Palpation:    Tender about the lumbar facet joints, paraspinal and b/l sciatic notches. Otherwise remainder of landmarks are nontender.  Range of Motion:     Lumbar flexion limited by pain  Strength:    able to heel walk, able to toe walk, quadriceps 5/5, hamstrings 5/5, gastrocsoleus 5/5, tibialis anterior 5/5, extensor hallicus longus 5/5  Special Tests:    Positive: slump test (bilateral)    Patient's conditions were thoroughly discussed during today's visit with greater than 50% of the visit spent counseling the patient with total time spent face-to-face with the patient being 20 minutes.    Shawn Meyers DO [facility]          Again, thank you for allowing me to participate in the care of your patient.        Sincerely,        Shawn Meyers DO

## 2018-07-06 NOTE — MR AVS SNAPSHOT
"              After Visit Summary   7/6/2018    Edgardo Mustafa    MRN: [id number]           Patient Information     Date Of Birth          1980        Visit Information        Provider Department      7/6/2018 10:20 AM Shawn Meyers,  [facility], [place]        Today's Diagnoses     Right lumbar radiculopathy    -  1    Chronic right-sided low back pain with right-sided sciatica        Muscle spasm          Care Instructions    1. Right lumbar radiculopathy    2. Chronic right-sided low back pain with right-sided sciatica      Given worsening pain recommend imaging  MRI of your lower back has been ordered. Will check for same day otherwise, schedule with [facility] ([phone]).   Rx for Flexeril and Norco. Use at night. No alcohol or driving while you're taking these.  Don't recommend taking together.    Please follow-up with me 2 days after you have completed your MRI with an e-visit.  You can find this option only when you log-in on a computer and is available on the [employer] homepage.  Select \"Online Care\" and \"start an E-visit\" (see screen shots below).   Select \"other\" as the e-visit type, indicate it's for a follow-up in the description and select yes that symptoms have been present before.                          Follow-ups after your visit        Future tests that were ordered for you today     Open Future Orders        Priority Expected Expires Ordered    MRI Lumbar spine w/o contrast Routine  7/6/2019 7/6/2018            Who to contact     If you have questions or need follow up information about today's clinic visit or your schedule please contact [facility], [place] directly at [phone].  Normal or non-critical lab and imaging results will be communicated to you by [employer], letter or phone within 4 business days after the clinic has received the results. If you do not hear from us within 7 days, please contact the clinic through [employer] or phone. If you " "have a critical or abnormal lab result, we will notify you by phone as soon as possible.  Submit refill requests through [employer] or call your pharmacy and they will forward the refill request to us. Please allow 3 business days for your refill to be completed.          Additional Information About Your Visit        [employer] Information     [employer] gives you secure access to your electronic health record. If you see a primary care provider, you can also send messages to your care team and make appointments. If you have questions, please call your primary care clinic.  If you do not have a primary care provider, please call [phone] and they will assist you.        Care EveryWhere ID     This is your Care EveryWhere ID. This could be used by other organizations to access your [facility] medical records  [id number]        Your Vitals Were     Height BMI (Body Mass Index)                5' 8\" (1.727 m) 25.09 kg/m2           Blood Pressure from Last 3 Encounters:   07/06/18 108/74   03/13/18 115/77   02/27/18 110/76    Weight from Last 3 Encounters:   07/06/18 165 lb (74.8 kg)   03/13/18 164 lb (74.4 kg)   02/27/18 164 lb (74.4 kg)                 Today's Medication Changes          These changes are accurate as of 7/6/18 10:51 AM.  If you have any questions, ask your nurse or doctor.               Start taking these medicines.        Dose/Directions    cyclobenzaprine 10 MG tablet   Commonly known as:  FLEXERIL   Used for:  Muscle spasm   Started by:  Shawn Meyers DO        Dose:  10 mg   Take 1 tablet (10 mg) by mouth nightly as needed for muscle spasms   Quantity:  15 tablet   Refills:  0         These medicines have changed or have updated prescriptions.        Dose/Directions    * HYDROcodone-acetaminophen 5-325 MG per tablet   Commonly known as:  NORCO   This may have changed:  Another medication with the same name was added. Make sure you understand how and when to take each.   Used for:  Acute midline " low back pain without sciatica   Changed by:  Shawn Meyers DO        Dose:  1 tablet   Take 1 tablet by mouth every 4 hours as needed for pain   Quantity:  15 tablet   Refills:  0       * HYDROcodone-acetaminophen 5-325 MG per tablet   Commonly known as:  NORCO   This may have changed:  You were already taking a medication with the same name, and this prescription was added. Make sure you understand how and when to take each.   Used for:  Chronic right-sided low back pain with right-sided sciatica, Right lumbar radiculopathy   Changed by:  Shawn Meyers DO        Dose:  1 tablet   Take 1 tablet by mouth nightly as needed for severe pain   Quantity:  10 tablet   Refills:  0       * Notice:  This list has 2 medication(s) that are the same as other medications prescribed for you. Read the directions carefully, and ask your doctor or other care provider to review them with you.         Where to get your medicines      These medications were sent to [id number]  [place] [address] [facility]  [address] [place] [address]     Phone:  [phone]     cyclobenzaprine 10 MG tablet         Some of these will need a paper prescription and others can be bought over the counter.  Ask your nurse if you have questions.     Bring a paper prescription for each of these medications     HYDROcodone-acetaminophen 5-325 MG per tablet               Information about OPIOIDS     PRESCRIPTION OPIOIDS: WHAT YOU NEED TO KNOW   We gave you an opioid (narcotic) pain medicine. It is important to manage your pain, but opioids are not always the best choice. You should first try all the other options your care team gave you. Take this medicine for as short a time (and as few doses) as possible.     These medicines have risks:    DO NOT drive when on new or higher doses of pain medicine. These medicines can affect your alertness and reaction times, and you could be arrested for driving  under the influence (DUI). If you need to use opioids long-term, talk to your care team about driving.    DO NOT operate heave machinery    DO NOT do any other dangerous activities while taking these medicines.     DO NOT drink any alcohol while taking these medicines.      If the opioid prescribed includes acetaminophen, DO NOT take with any other medicines that contain acetaminophen. Read all labels carefully. Look for the word  acetaminophen  or  Tylenol.  Ask your pharmacist if you have questions or are unsure.    You can get addicted to pain medicines, especially if you have a history of addiction (chemical, alcohol or substance dependence). Talk to your care team about ways to reduce this risk.    Store your pills in a secure place, locked if possible. We will not replace any lost or stolen medicine. If you don t finish your medicine, please throw away (dispose) as directed by your pharmacist. The [place] [employer]ution [employer] has more information about safe disposal: https://www.pca.[facility].us/living-green/managing-unwanted-medications.     All opioids tend to cause constipation. Drink plenty of water and eat foods that have a lot of fiber, such as fruits, vegetables, prune juice, apple juice and high-fiber cereal. Take a laxative (Miralax, milk of magnesia, Colace, Senna) if you don t move your bowels at least every other day.          Primary Care Provider Office Phone # Fax #    Mesfin Rosales [phone] [address] [place] [address]        Equal Access to Services     [facility]: Hadii aad ku hadasho Sonikolaiali, waaxda luqadaha, qaybta kaalmada adeegyada, joleen garcia. So [facility] [phone].    ATENCIÓN: Si habla español, tiene a montemayor disposición servicios gratuitos de asistencia lingüística. Llame al [phone].    We comply with applicable federal civil rights laws and [place] laws. We do not discriminate on the basis of  race, color, national origin, age, disability, sex, sexual orientation, or gender identity.            Thank you!     Thank you for choosing [facility]  for your care. Our goal is always to provide you with excellent care. Hearing back from our patients is one way we can continue to improve our services. Please take a few minutes to complete the written survey that you may receive in the mail after your visit with us. Thank you!             Your Updated Medication List - Protect others around you: Learn how to safely use, store and throw away your medicines at www.disposemymeds.org.          This list is accurate as of 7/6/18 10:51 AM.  Always use your most recent med list.                   Brand Name Dispense Instructions for use Diagnosis    aluminum chloride 20 % external solution    DRYSOL    60 mL    To improve effect, cover area of application with plastic wrap,  and wash area in morning. As sweating improves, decrease use to 1-2 times weekly.    Generalized hyperhidrosis       cyclobenzaprine 10 MG tablet    FLEXERIL    15 tablet    Take 1 tablet (10 mg) by mouth nightly as needed for muscle spasms    Muscle spasm       FLUoxetine 10 MG capsule    PROzac    90 capsule    Take 1 capsule (10 mg) by mouth daily    MARU (generalized anxiety disorder)       * HYDROcodone-acetaminophen 5-325 MG per tablet    NORCO    15 tablet    Take 1 tablet by mouth every 4 hours as needed for pain    Acute midline low back pain without sciatica       * HYDROcodone-acetaminophen 5-325 MG per tablet    NORCO    10 tablet    Take 1 tablet by mouth nightly as needed for severe pain    Chronic right-sided low back pain with right-sided sciatica, Right lumbar radiculopathy       pilocarpine 1 % ophthalmic solution    PILOCAR    1 Bottle    Place 1 drop Into the left eye 2 times daily    Adie's pupil, left       zolpidem 10 MG tablet    AMBIEN    90 tablet    One half to one tab QHS prn    Chronic insomnia       *  Notice:  This list has 2 medication(s) that are the same as other medications prescribed for you. Read the directions carefully, and ask your doctor or other care provider to review them with you.

## 2018-07-06 NOTE — PROGRESS NOTES
"ASSESSMENT & PLAN    1. Right lumbar radiculopathy    2. Chronic right-sided low back pain with right-sided sciatica      Worsening pain recommend imaging  MRI of your lower back has been ordered. Will check for same day otherwise, schedule with Shanta ([phone]).   Rx for Flexeril and Norco. Use at night. No alcohol or driving while you're taking these.  Don't recommend taking together.    Please follow-up with me 2 days after you have completed your MRI with an e-visit.     -----    SUBJECTIVE:  Edgardo Mustafa is a 37 year old male who is seen in follow-up for low back.They were last seen 3/13/2018.     Since their last visit reports worsening pain. He had some improvement and now the back pain is acting up again. He is having pain in the low back, radiates to right hip. Also notes numbness in the right lateral ankle/foot. They indicate that their current pain level is 9/10, constant, intolerable. They have tried ice, heat, ibuprofen, home exercises and physical therapy (3 visits).      The patient is seen by themselves.    Patient's past medical, surgical, social, and family histories were reviewed today and no changes are noted.    REVIEW OF SYSTEMS:  Constitutional: NEGATIVE for fever, chills, change in weight  Skin: NEGATIVE for worrisome rashes, moles or lesions  GI/: NEGATIVE for bowel or bladder changes  Neuro: NEGATIVE for weakness, dizziness or paresthesias    OBJECTIVE:  /74 (BP Location: Right arm, Patient Position: Chair, Cuff Size: Adult Large)  Ht 5' 8\" (1.727 m)  Wt 165 lb (74.8 kg)  BMI 25.09 kg/m2   General: healthy, alert and in moderate distress 2/2 pain  HEENT: no scleral icterus or conjunctival erythema  Skin: no suspicious lesions or rash. No jaundice.  CV: no pedal edema  Resp: normal respiratory effort without conversational dyspnea   Psych: normal mood and affect  Gait: slow to rise from chair, antalgic gait, appropriate coordination and balance  Neuro: normal light " touch sensory exam of the extremities.    MSK:  THORACIC/LUMBAR SPINE  Inspection:    Superificial redness overlying lumbo sacral region, likely related to heating/thermal burn.   Palpation:    Tender about the lumbar facet joints, paraspinal and b/l sciatic notches. Otherwise remainder of landmarks are nontender.  Range of Motion:     Lumbar flexion limited by pain  Strength:    able to heel walk, able to toe walk, quadriceps 5/5, hamstrings 5/5, gastrocsoleus 5/5, tibialis anterior 5/5, extensor hallicus longus 5/5  Special Tests:    Positive: slump test (bilateral)    Patient's conditions were thoroughly discussed during today's visit with greater than 50% of the visit spent counseling the patient with total time spent face-to-face with the patient being 20 minutes.    Shawn Meyers DO [facility]

## 2018-07-11 ENCOUNTER — E-VISIT (OUTPATIENT)
Dept: ORTHOPEDICS | Facility: CLINIC | Age: 38
End: 2018-07-11
Payer: COMMERCIAL

## 2018-07-11 DIAGNOSIS — M54.16 RIGHT LUMBAR RADICULOPATHY: ICD-10-CM

## 2018-07-11 DIAGNOSIS — M51.26 LUMBAR HERNIATED DISC: Primary | ICD-10-CM

## 2018-07-11 PROCEDURE — 99207 ZZC NO CHARGE LOS: CPT | Performed by: FAMILY MEDICINE

## 2018-07-11 NOTE — MR AVS SNAPSHOT
After Visit Summary   7/11/2018    Edgardo Mustafa    MRN: [id number]           Patient Information     Date Of Birth          1980        Visit Information        Provider Department      7/11/2018 1:24 PM Shawn Meyers DO [facility]        Today's Diagnoses     Lumbar herniated disc    -  1    Right lumbar radiculopathy           Follow-ups after your visit        Future tests that were ordered for you today     Open Future Orders        Priority Expected Expires Ordered    XR Lumbar Transforaminal Inj Single Routine 7/11/2018 7/12/2019 7/11/2018            Who to contact     If you have questions or need follow up information about today's clinic visit or your schedule please contact [facility] directly at [phone].  Normal or non-critical lab and imaging results will be communicated to you by [employer], letter or phone within 4 business days after the clinic has received the results. If you do not hear from us within 7 days, please contact the clinic through [employer] or phone. If you have a critical or abnormal lab result, we will notify you by phone as soon as possible.  Submit refill requests through [employer]V or call your pharmacy and they will forward the refill request to us. Please allow 3 business days for your refill to be completed.          Additional Information About Your Visit        MyChart Information     [employer] gives you secure access to your electronic health record. If you see a primary care provider, you can also send messages to your care team and make appointments. If you have questions, please call your primary care clinic.  If you do not have a primary care provider, please call [phone] and they will assist you.        Care EveryWhere ID     This is your Care EveryWhere ID. This could be used by other organizations to access your [facility] medical records  [id number]         Blood Pressure from Last 3  Encounters:   07/06/18 108/74   03/13/18 115/77   02/27/18 110/76    Weight from Last 3 Encounters:   07/06/18 165 lb (74.8 kg)   03/13/18 164 lb (74.4 kg)   02/27/18 164 lb (74.4 kg)               Primary Care Provider Office Phone # Fax #    Mesfin Rosales [phone] [address] [place] [address]        Equal Access to Services     [facility]: Hadii aad ku hadasho Soomaali, waaxda luqadaha, qaybta kaalmada adeegyada, waxay idiin hayaan adeeg kharakristy lajose . So [facility] [phone].    ATENCIÓN: Si habla español, tiene a montemayor disposición servicios gratuitos de asistencia lingüística. Llame al [phone].    We comply with applicable federal civil rights laws and [place] laws. We do not discriminate on the basis of race, color, national origin, age, disability, sex, sexual orientation, or gender identity.            Thank you!     Thank you for choosing [facility]  for your care. Our goal is always to provide you with excellent care. Hearing back from our patients is one way we can continue to improve our services. Please take a few minutes to complete the written survey that you may receive in the mail after your visit with us. Thank you!             Your Updated Medication List - Protect others around you: Learn how to safely use, store and throw away your medicines at www.disposemymeds.org.          This list is accurate as of 7/11/18  3:45 PM.  Always use your most recent med list.                   Brand Name Dispense Instructions for use Diagnosis    aluminum chloride 20 % external solution    DRYSOL    60 mL    To improve effect, cover area of application with plastic wrap,  and wash area in morning. As sweating improves, decrease use to 1-2 times weekly.    Generalized hyperhidrosis       cyclobenzaprine 10 MG tablet    FLEXERIL    15 tablet    Take 1 tablet (10 mg) by mouth nightly as needed for muscle spasms    Muscle spasm        FLUoxetine 10 MG capsule    PROzac    90 capsule    Take 1 capsule (10 mg) by mouth daily    MARU (generalized anxiety disorder)       * HYDROcodone-acetaminophen 5-325 MG per tablet    NORCO    15 tablet    Take 1 tablet by mouth every 4 hours as needed for pain    Acute midline low back pain without sciatica       * HYDROcodone-acetaminophen 5-325 MG per tablet    NORCO    10 tablet    Take 1 tablet by mouth nightly as needed for severe pain    Chronic right-sided low back pain with right-sided sciatica, Right lumbar radiculopathy       pilocarpine 1 % ophthalmic solution    PILOCAR    1 Bottle    Place 1 drop Into the left eye 2 times daily    Adie's pupil, left       zolpidem 10 MG tablet    AMBIEN    90 tablet    One half to one tab QHS prn    Chronic insomnia       * Notice:  This list has 2 medication(s) that are the same as other medications prescribed for you. Read the directions carefully, and ask your doctor or other care provider to review them with you.

## 2018-07-11 NOTE — TELEPHONE ENCOUNTER
MRI results communicated through E visit.  Recommend REBECCA and order placed through [facility] to expedite given significant pain level.    Plan is to follow-up 2 weeks after the injection or sooner as indicated    Shawn Meyers DO, JIMENAM  [facility]

## 2018-07-17 ENCOUNTER — TRANSFERRED RECORDS (OUTPATIENT)
Dept: HEALTH INFORMATION MANAGEMENT | Facility: CLINIC | Age: 38
End: 2018-07-17

## 2018-07-18 ENCOUNTER — TELEPHONE (OUTPATIENT)
Dept: ORTHOPEDICS | Facility: CLINIC | Age: 38
End: 2018-07-18

## 2018-07-18 NOTE — TELEPHONE ENCOUNTER
"EXAM: DIAGNOSTIC TRANSFORAMINAL LUMBAR EPIDUROGRAPHY WITH THERAPEUTIC EPIDURAL STEROID AND LOCAL ANESTHETIC INJECTION, RIGHT L4-5    CLINICAL INFORMATION: 37-year-old male with right greater than left back and hip pain. MRI lumbar spine dated 7/6/2018 was reviewed demonstrating lumbar spondylosis at L4-5 with transitional lumbosacral anatomy and a right L5-S1 accessory articulation.    DIAGNOSTIC TRANSFORAMINAL EPIDUROGRAPHY:    TECHNICAL INFORMATION/INTERPRETATION: Following a discussion of the procedure, its benefits, and potential complications, the patient elected to proceed, and written informed consent was obtained.    With the patient in the prone position, the skin of the lower back was prepped and draped in sterile fashion. Under fluoroscopic guidance, a 5\" 22-gauge spinal needle was carefully advanced into the right L4-5 foramen via a dorsolateral approach. 4.0 mL iohexol 240 mg/mL were injected under direct live fluoroscopic observation, opacifying the proximal nerve sheath with reflux into the epidural space. After careful review of the epidurogram, it was determined that a therapeutic epidural steroid injection is appropriate and could be safely performed.    CONCLUSION:    1. Dispersal of contrast within the epidural space as described above.    2. Based upon the epidurogram, it is safe/reasonable to proceed with therapeutic epidural steroid injection at this site.    THERAPEUTIC TRANSFORAMINAL EPIDURAL STEROID INJECTION:    TECHNICAL INFORMATION/INTERPRETATION: Following careful review of the epidurogram, a therapeutic mixture consisting of 3.0 mL lidocaine 1% and 1.0 mL dexamethasone 10 mg/mL was injected. Postinjection radiographs reveal further dispersal of injected materials.    The patient was monitored for response and described 45% relief 20 minutes postinjection.    Total fluoroscopic time for the entire, combined procedure was 16.5 seconds. Total number of fluoroscopic images: " 4.    CONCLUSION:    1. Fluoroscopic-guided right L4-5 transforaminal epidural injection of steroid and local anesthetic, without complication.    2. Initial therapeutic response to injected local anesthetic is rated R1 (partial relief of pain symptoms rated at 45%).    3. Note is made of transitional lumbosacral anatomy with right L5-S1 accessory articulation.    MWJ        Electronically signed on 7/17/2018 3:40:00 PM by Vanessa Dean M.D.

## 2018-07-20 NOTE — TELEPHONE ENCOUNTER
Reviewed documentation of a successful right L4-5 transforaminal epidural steroid injection completed at [facility] on 7/17/18.  He had 45% pain relief after the injection.  Will await 2 weeks to see full efficacy of the injection.    Shawn Meyers DO, [facility]  [facility]

## 2018-08-20 ENCOUNTER — MYC MEDICAL ADVICE (OUTPATIENT)
Dept: ORTHOPEDICS | Facility: CLINIC | Age: 38
End: 2018-08-20

## 2018-08-21 ENCOUNTER — MYC MEDICAL ADVICE (OUTPATIENT)
Dept: ORTHOPEDICS | Facility: CLINIC | Age: 38
End: 2018-08-21

## 2018-08-21 DIAGNOSIS — M54.16 RIGHT LUMBAR RADICULOPATHY: Primary | ICD-10-CM

## 2018-08-23 ENCOUNTER — MYC MEDICAL ADVICE (OUTPATIENT)
Dept: ORTHOPEDICS | Facility: CLINIC | Age: 38
End: 2018-08-23

## 2018-08-30 ENCOUNTER — TRANSFERRED RECORDS (OUTPATIENT)
Dept: HEALTH INFORMATION MANAGEMENT | Facility: CLINIC | Age: 38
End: 2018-08-30

## 2018-08-31 NOTE — TELEPHONE ENCOUNTER
Received and reviewed notes from [facility] of a successful right L5-S1 transforaminal injection completed on 8/30/18.    Of note this is the second injection patient has received.  Patient reports 95% pain relief after the injection.  Previous injection was a right L4-5 and had 45% pain relief after the injection.    Chart review shows that he is reached out to physical therapy to restart home exercise.  Her weight final results/efficacy of injection.    Shawn Meyers DO, CAM  [facility]

## 2018-08-31 NOTE — TELEPHONE ENCOUNTER
EXAM: TRANSFORAMINAL LUMBAR EPIDUROGRAPHY WITH THERAPEUTIC EPIDURAL CORTICOSTEROID AND ANESTHETIC ADMINISTRATION (RIGHT L5-S1 FORAMEN)    CLINICAL INFORMATION: Male, age 37 years, with 5 months lumbar, right buttock, and right lateral thigh pain. No known injury or lumbar spine surgery. 10 weeks physical and oral prescription medication therapy.    COMPARISON: A comparison right L4 transforaminal epidurogram is available from July 17, 2018. A comparison [facility] [place] MRI study is available from July 6, 2018, demonstrating a transitional lumbosacral junction, L5 sacralized on the right, with an L4-L5 disc herniation compressing the proximal right L5 nerve sleeves.    DIAGNOSTIC TRANSFORAMINAL EPIDUROGRAPHY:    TECHNICAL INFORMATION: Right L5-S1 transforaminal injection was chosen for today's study. With the patient prone and the low back sterilely prepared, intermittent fluoroscopic guidance was used to carefully advance a 3.5-inch, 22-gauge spinal needle from a right posterolateral approach until its tip was immediately inferior to the right L5 pedicle. After negative aspiration, under direct live fluoroscopic observation 3 mL of 240 mg/mL iohexol were injected and spot films obtained.    INTERPRETATION: Injected contrast dispersed normally in the right lower lumbar epidural space from L4 through L5 levels and along the right L5 nerve root sleeve.    CONCLUSION:    1. Normal dispersion of contrast in the right lower lumbar epidural space and along the right L5 nerve root sleeve.    2. Based upon the epidurogram, it appeared to be safe/reasonable to proceed with therapeutic epidural steroid injection at this site.    THERAPEUTIC TRANSFORAMINAL EPIDURAL STEROID INJECTION:    TECHNICAL INFORMATION: Following careful review of the epidurogram, 3 mL of 1% lidocaine and 1.0 mL of 10 mg/mL dexamethasone were injected, the needle removed, and additional spot films obtained.    Total fluoroscopy time for the  procedure was 8 sec. Total number of fluoroscopic images: 4.    INTERPRETATION: Injected steroid dispersed contrast normally.    CONCLUSION:    1. Successful therapeutic transforaminal epidural steroid injection performed without complication.    2. The patient reported 95% decrease in typical right-sided pain symptoms immediately after the procedure (R1).    Electronically signed on 8/30/2018 3:22:00 PM by Manan Reyes M.D.

## 2018-10-10 DIAGNOSIS — F51.04 CHRONIC INSOMNIA: ICD-10-CM

## 2018-10-10 NOTE — TELEPHONE ENCOUNTER
Requested Prescriptions   Pending Prescriptions Disp Refills     zolpidem (AMBIEN) 10 MG tablet        Last Written Prescription Date:  5/9/2018  Last Fill Quantity: 90,   # refills: 1  Last Office Visit: 11/7/2017  Future Office visit:       Routing refill request to provider for review/approval because:  Drug not on the [facility], P or [facility] refill protocol or controlled substance   90 tablet 1     Sig: One half to one tab QHS prn    There is no refill protocol information for this order

## 2018-10-11 RX ORDER — ZOLPIDEM TARTRATE 10 MG/1
TABLET ORAL
Qty: 90 TABLET | Refills: 1 | Status: SHIPPED | OUTPATIENT
Start: 2018-10-11 | End: 2019-03-18

## 2018-10-15 DIAGNOSIS — F41.1 GAD (GENERALIZED ANXIETY DISORDER): ICD-10-CM

## 2018-10-15 NOTE — TELEPHONE ENCOUNTER
"Requested Prescriptions   Pending Prescriptions Disp Refills     FLUoxetine (PROZAC) 10 MG capsule [Pharmacy Med Name: FLUOXETINE 10MG CAPSULES]    Last Written Prescription Date:  10/17/2017  Last Fill Quantity: 90,  # refills: 3   Last office visit: 2/27/2018 with prescribing provider:  Mesfin Rosales     Future Office Visit:     90 capsule 0     Sig: TAKE 1 CAPSULE(10 MG) BY MOUTH DAILY    SSRIs Protocol Passed    10/15/2018  3:46 AM       Passed - Recent (12 mo) or future (30 days) visit within the authorizing provider's specialty    Patient had office visit in the last 12 months or has a visit in the next 30 days with authorizing provider or within the authorizing provider's specialty.  See \"Patient Info\" tab in inbasket, or \"Choose Columns\" in Meds & Orders section of the refill encounter.           Passed - Patient is age 18 or older          "

## 2018-10-17 RX ORDER — FLUOXETINE 10 MG/1
CAPSULE ORAL
Qty: 90 CAPSULE | Refills: 0 | Status: SHIPPED | OUTPATIENT
Start: 2018-10-17 | End: 2018-12-10

## 2018-10-17 NOTE — TELEPHONE ENCOUNTER
Routing refill request to provider for review/approval because:  Drug interaction warning  Dx is anxiety  Jaleesa EricksonRN BSN  [facility]  [phone]

## 2018-10-17 NOTE — TELEPHONE ENCOUNTER
rx sent without refills. Due for OV to follow-up anxiety  Jenelle Rogers MD  Internal Medicine/Pediatrics

## 2018-10-18 NOTE — TELEPHONE ENCOUNTER
Called patient and informed him that RX was sent and that he will need an office visit prior to needing next refill.

## 2018-11-27 ENCOUNTER — MYC MEDICAL ADVICE (OUTPATIENT)
Dept: ORTHOPEDICS | Facility: CLINIC | Age: 38
End: 2018-11-27

## 2018-11-27 DIAGNOSIS — M51.16 LUMBAR DISC HERNIATION WITH RADICULOPATHY: Primary | ICD-10-CM

## 2018-12-04 ENCOUNTER — OFFICE VISIT (OUTPATIENT)
Dept: NEUROSURGERY | Facility: CLINIC | Age: 38
End: 2018-12-04
Attending: NEUROLOGICAL SURGERY
Payer: COMMERCIAL

## 2018-12-04 ENCOUNTER — RADIANT APPOINTMENT (OUTPATIENT)
Dept: GENERAL RADIOLOGY | Facility: CLINIC | Age: 38
End: 2018-12-04
Attending: NEUROLOGICAL SURGERY
Payer: COMMERCIAL

## 2018-12-04 VITALS
OXYGEN SATURATION: 97 % | DIASTOLIC BLOOD PRESSURE: 76 MMHG | WEIGHT: 170.2 LBS | BODY MASS INDEX: 25.79 KG/M2 | SYSTOLIC BLOOD PRESSURE: 110 MMHG | HEART RATE: 88 BPM | HEIGHT: 68 IN

## 2018-12-04 DIAGNOSIS — M54.16 LUMBAR RADICULOPATHY: Primary | ICD-10-CM

## 2018-12-04 DIAGNOSIS — M54.16 LUMBAR RADICULOPATHY: ICD-10-CM

## 2018-12-04 DIAGNOSIS — M54.41 CHRONIC BILATERAL LOW BACK PAIN WITH RIGHT-SIDED SCIATICA: ICD-10-CM

## 2018-12-04 DIAGNOSIS — G89.29 CHRONIC BILATERAL LOW BACK PAIN WITH RIGHT-SIDED SCIATICA: ICD-10-CM

## 2018-12-04 PROCEDURE — 72110 X-RAY EXAM L-2 SPINE 4/>VWS: CPT

## 2018-12-04 PROCEDURE — 99204 OFFICE O/P NEW MOD 45 MIN: CPT | Performed by: NEUROLOGICAL SURGERY

## 2018-12-04 PROCEDURE — G0463 HOSPITAL OUTPT CLINIC VISIT: HCPCS

## 2018-12-04 ASSESSMENT — PAIN SCALES - GENERAL: PAINLEVEL: MODERATE PAIN (5)

## 2018-12-04 NOTE — NURSING NOTE
"Edgardo Mustafa is a 38 year old male who presents for:  Chief Complaint   Patient presents with     Neurologic Problem     Lumbar disc herniation with radiculopathy        Vitals:    Vitals:    12/04/18 0833   BP: 110/76   Pulse: 88   SpO2: 97%   Weight: 170 lb 3.2 oz (77.2 kg)   Height: 5' 8\" (1.727 m)       BMI:  Estimated body mass index is 25.88 kg/(m^2) as calculated from the following:    Height as of this encounter: 5' 8\" (1.727 m).    Weight as of this encounter: 170 lb 3.2 oz (77.2 kg).    Pain Score:  Moderate Pain (5)        Shoshana Peralta      "
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
## 2018-12-04 NOTE — PATIENT INSTRUCTIONS
1. X-ray today. We will call you if abnormal.      Please call our clinic with any questions or concerns: [phone]
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
## 2018-12-04 NOTE — MR AVS SNAPSHOT
After Visit Summary   12/4/2018    Edgardo Mustafa    MRN: [id number]           Patient Information     Date Of Birth          1980        Visit Information        Provider Department      12/4/2018 8:30 AM Ernie Hatfield MD [facility]        Today's Diagnoses     Lumbar radiculopathy    -  1      Care Instructions    1. X-ray today. We will call you if abnormal.      Please call our clinic with any questions or concerns: [phone]            Follow-ups after your visit        Your next 10 appointments already scheduled     Dec 10, 2018  2:40 PM CST   SHORT with Mesfin Rosales MD   [facility]    [address] [place] [address]  [address]  [facility] [address] [phone]              Future tests that were ordered for you today     Open Future Orders        Priority Expected Expires Ordered    XR Lumbar Spine G/E 4 Views Routine 12/4/2018 12/4/2019 12/4/2018            Who to contact     If you have questions or need follow up information about today's clinic visit or your schedule please contact [facility] directly at [phone].  Normal or non-critical lab and imaging results will be communicated to you by MyChart, letter or phone within 4 business days after the clinic has received the results. If you do not hear from us within 7 days, please contact the clinic through Quitbithart or phone. If you have a critical or abnormal lab result, we will notify you by phone as soon as possible.  Submit refill requests through DocSpera or call your pharmacy and they will forward the refill request to us. Please allow 3 business days for your refill to be completed.          Additional Information About Your Visit        MyChart Information     DocSpera gives you secure access to your electronic health record. If you see a primary care provider, you can also send messages to your care team and make  "appointments. If you have questions, please call your primary care clinic.  If you do not have a primary care provider, please call [phone] and they will assist you.        Care EveryWhere ID     This is your Care EveryWhere ID. This could be used by other organizations to access your [place] medical records  [id number]        Your Vitals Were     Pulse Height Pulse Oximetry BMI (Body Mass Index)          88 5' 8\" (1.727 m) 97% 25.88 kg/m2         Blood Pressure from Last 3 Encounters:   12/04/18 110/76   07/06/18 108/74   03/13/18 115/77    Weight from Last 3 Encounters:   12/04/18 170 lb 3.2 oz (77.2 kg)   07/06/18 165 lb (74.8 kg)   03/13/18 164 lb (74.4 kg)               Primary Care Provider Office Phone # Fax #    Mesfin Rosales [phone] [address] [place] [address]        Equal Access to Services     [facility]: Hadii aad ku hadasho Soomaali, waaxda luqadaha, qaybta kaalmada adeegyada, waxay idiin hayaan lisha washingtonarakristy herrera . So [facility] [phone].    ATENCIÓN: Si habla español, tiene a montemayor disposición servicios gratuitos de asistencia lingüística. [facility] [phone].    We comply with applicable federal civil rights laws and [place] laws. We do not discriminate on the basis of race, color, national origin, age, disability, sex, sexual orientation, or gender identity.            Thank you!     Thank you for choosing [facility]  for your care. Our goal is always to provide you with excellent care. Hearing back from our patients is one way we can continue to improve our services. Please take a few minutes to complete the written survey that you may receive in the mail after your visit with us. Thank you!             Your Updated Medication List - Protect others around you: Learn how to safely use, store and throw away your medicines at www.disposemymeds.org.          This list is accurate as of 12/4/18  9:12 AM.  Always use your " most recent med list.                   Brand Name Dispense Instructions for use Diagnosis    aluminum chloride 20 % external solution    DRYSOL    60 mL    To improve effect, cover area of application with plastic wrap,  and wash area in morning. As sweating improves, decrease use to 1-2 times weekly.    Generalized hyperhidrosis       cyclobenzaprine 10 MG tablet    FLEXERIL    15 tablet    Take 1 tablet (10 mg) by mouth nightly as needed for muscle spasms    Muscle spasm       FLUoxetine 10 MG capsule    PROzac    90 capsule    TAKE 1 CAPSULE(10 MG) BY MOUTH DAILY    MARU (generalized anxiety disorder)       * HYDROcodone-acetaminophen 5-325 MG tablet    NORCO    15 tablet    Take 1 tablet by mouth every 4 hours as needed for pain    Acute midline low back pain without sciatica       * HYDROcodone-acetaminophen 5-325 MG tablet    NORCO    10 tablet    Take 1 tablet by mouth nightly as needed for severe pain    Chronic right-sided low back pain with right-sided sciatica, Right lumbar radiculopathy       pilocarpine 1 % ophthalmic solution    PILOCAR    1 Bottle    Place 1 drop Into the left eye 2 times daily    Adie's pupil, left       zolpidem 10 MG tablet    AMBIEN    90 tablet    One half to one tab QHS prn    Chronic insomnia       * Notice:  This list has 2 medication(s) that are the same as other medications prescribed for you. Read the directions carefully, and ask your doctor or other care provider to review them with you.

## 2018-12-10 ENCOUNTER — OFFICE VISIT (OUTPATIENT)
Dept: PEDIATRICS | Facility: CLINIC | Age: 38
End: 2018-12-10
Payer: COMMERCIAL

## 2018-12-10 VITALS
DIASTOLIC BLOOD PRESSURE: 60 MMHG | OXYGEN SATURATION: 100 % | SYSTOLIC BLOOD PRESSURE: 102 MMHG | BODY MASS INDEX: 25.39 KG/M2 | WEIGHT: 167 LBS | TEMPERATURE: 98.5 F | HEART RATE: 71 BPM

## 2018-12-10 DIAGNOSIS — K64.8 HEMORRHOIDS, INTERNAL: ICD-10-CM

## 2018-12-10 DIAGNOSIS — F51.04 CHRONIC INSOMNIA: ICD-10-CM

## 2018-12-10 DIAGNOSIS — R61 GENERALIZED HYPERHIDROSIS: ICD-10-CM

## 2018-12-10 DIAGNOSIS — R41.840 INATTENTION: Primary | ICD-10-CM

## 2018-12-10 PROCEDURE — 99214 OFFICE O/P EST MOD 30 MIN: CPT | Performed by: INTERNAL MEDICINE

## 2018-12-10 NOTE — PATIENT INSTRUCTIONS
INSTRUCTIONS FOR TODAY:     schedule visit for ADD testing, follow-up after testing complete   blood in stool suspect hemorrhoid, review handout.   Fiber supplement: Citrucel     Dr Rosales                Hemorrhoids   What are hemorrhoids?   Hemorrhoids are swollen veins and tissue in the lower rectum and anus. The anus is at the end of the rectum and is the opening through which bowel movements pass from your body. Hemorrhoids are a common problem. Another name for them is piles.   Hemorrhoids may be internal (inside the rectum) or external (around the anus). Internal hemorrhoids are often painless but they sometimes cause a lot of bleeding. The internal veins may stretch and even fall out (prolapse) through the anus to outside the body. The veins may then become irritated and painful. External hemorrhoids can be seen or felt easily around the anal opening. When the swollen veins are scratched or broken by straining, rubbing, or wiping, they sometimes bleed.   How do they occur?   Veins in the rectum and around the anus tend to swell under pressure. Hemorrhoids can result from too much pressure on these veins. You may put pressure on these veins by:     straining to have a bowel movement when you are constipated     waiting too long to have a bowel movement     sitting for a long time on the toilet, which causes strain on the anal area     coughing and sneezing often     sitting for a long while.   Hemorrhoids may also develop from:     diarrhea     obesity     injury to the anus, for example, from anal intercourse     some liver diseases.   Flare-ups of hemorrhoids may occur during periods of stress. Some people inherit a tendency to have hemorrhoids.   Pregnant women should try to avoid becoming constipated because they are more likely to have hemorrhoids during pregnancy. In the last trimester of pregnancy, the enlarged uterus may press on blood vessels and cause hemorrhoids. Also, the strain of childbirth  sometimes causes hemorrhoids after the birth.   What are the symptoms?   Symptoms of hemorrhoids include:     itching, mild burning, and bleeding around the anus (for example, you might see bright red blood on toilet paper after wiping)     swelling and tenderness around the anus     pain with bowel movements     painful lumps around the anus ranging in size from a pea to a walnut (in severe cases).   How are they diagnosed?   Your healthcare provider will examine your rectum and anus. Your provider may use a special small light called a proctoscope or anoscope to look inside the rectum.   How is it treated?   The following treatments usually help to relieve most cases of hemorrhoids:     High-fiber diet   Eat more high-fiber foods, which will help prevent constipation. The best sources of fiber are whole-grain cereals, such as shredded wheat or cereals with bran. Fresh fruit and raw or cooked vegetables, especially asparagus, cabbage, carrots, corn, and broccoli are other good sources of fiber.     Fluids   Drink plenty of water. This helps to soften bowel movements so they are easier to pass.     Sitz baths and cold packs   Sitting in lukewarm water 2 or 3 times a day for 15 minutes cleans the anal area and may relieve discomfort. (If the bath water is too hot, swelling around the anus will get worse.) Also, you might try putting a cloth-covered ice pack on the anus for 10 minutes, 4 times a day.     Medications   For mild discomfort, your healthcare provider may prescribe a cream or ointment for the painful area. The cream may contain witch hazel, zinc oxide, or petroleum jelly. Your provider may also prescribe medicated suppositories to put inside the rectum.     Procedures and surgeries   A number of procedures can be used to remove or shrink hemorrhoids. If you have painful, protruding internal hemorrhoids, your healthcare provider can do a procedure called hemorrhoid banding. Your provider will put a tight  band around the enlarged vein and either cut the hemorrhoid open, remove any blood clots, and let the vein heal, or let the hemorrhoid dry up and fall off. This method is effective in most cases. Other methods include destroying the hemorrhoid with freezing, electrical or laser heat, or infrared light. Or your provider may shrink the hemorrhoid by injecting a chemical around the swollen vein.   For severe cases of hemorrhoids, a surgical procedure called a hemorrhoidectomy may be done. For this procedure you are first given an anesthetic to prevent you from feeling pain. Then your surgeon removes the hemorrhoids.   How long will the effects last?   Usually hemorrhoids do not pose a danger to your health. In most cases the symptoms go away in a few days. The painful lumps of more severe cases should get better in a couple of weeks.   How can I take care of myself?   Always tell your healthcare provider when you have rectal bleeding. Although bleeding may be from hemorrhoids, more serious illnesses, such as colon cancer, can also cause bleeding.   Follow these guidelines to help prevent hemorrhoids and to relieve their discomfort:     Do not strain during bowel movements. The straining makes hemorrhoids swell.     Follow your high-fiber diet and drink plenty of water. If necessary, take a stool softener, such as Milady's M-O, psyllium, Metamucil or Citrucel, or mineral oil. Softer stools make it easier to empty the bowels and reduce pressure on the veins.     Don't overuse laxatives. Diarrhea can be as irritating to the anus as constipation.     Ask your healthcare provider what nonprescription product you should buy to relieve pain and itching. Also, ask about any side effects of any medications prescribed for you.     Exercise regularly to help prevent constipation.     Avoid a lot of wiping after a bowel movement if you have hemorrhoids. Wiping with soft, moist toilet paper (or a commercial moist pad or baby wipe)  may relieve discomfort. If necessary, shower instead of wiping, then dry the anus gently.

## 2018-12-10 NOTE — PROGRESS NOTES
SUBJECTIVE:                                                    Edgardo Mustafa is a 38 year old male who presents to clinic today for the following health issues:    Patient presents today for follow-up of recent visit for anxiety.  Since most recent visit, patient has discontinued SSRI.  Specifically patient developed side effects including weight gain, sexual dysfunction, flattened affect which he found bothersome.  In addition, patient had complaints of poor attention which did not improve on SSRI.      Presents today with concerns about continued problems maintaining attention-states that this tends to drive underlying anxiety.  Has questions about an evaluation for ADD.      Additional history of chronic insomnia-patient continues on Ambien, has used Ambien long-term without side effects.  Prior attempts to taper ambien have not gone well.      Additional concerns today regarding  episodes of bright red blood per rectum noted at the end of the stool.  Does note pattern of episodic loose stool and constipation.  Notes blood typically when he tends to be more constipated, not frequent.    Patient Active Problem List   Diagnosis     Generalized hyperhidrosis     Adie's pupil, left     Partial traumatic transphalangeal amputation of finger, sequela (H)     Neuropathic pain of hand, left     MARU (generalized anxiety disorder)     Chronic insomnia     Mechanical low back pain     Past Surgical History:   Procedure Laterality Date     SURGICAL PATHOLOGY EXAM      repair deviated septum       Social History     Tobacco Use     Smoking status: Never Smoker     Smokeless tobacco: Never Used   Substance Use Topics     Alcohol use: Yes     Comment: rare     Family History   Problem Relation Age of Onset     Hypertension Mother      Diabetes No family hx of      Coronary Artery Disease No family hx of      Cancer - colorectal No family hx of      Prostate Cancer No family hx of          Current Outpatient Medications    Medication Sig Dispense Refill     aluminum chloride (DRYSOL) 20 % external solution To improve effect, cover area of application with plastic wrap,  and wash area in morning. As sweating improves, decrease use to 1-2 times weekly. 120 mL 6     pilocarpine (PILOCAR) 1 % ophthalmic solution Place 1 drop Into the left eye 2 times daily 1 Bottle 3     zolpidem (AMBIEN) 10 MG tablet One half to one tab QHS prn 90 tablet 1     cyclobenzaprine (FLEXERIL) 10 MG tablet Take 1 tablet (10 mg) by mouth nightly as needed for muscle spasms (Patient not taking: Reported on 12/10/2018) 15 tablet 0       ROS: The following systems have been completely reviewed and are negative except as noted in the HPI: CONSTITUTIONAL, GASTROINTESTINAL    OBJECTIVE:                                                    /60 (Cuff Size: Adult Regular)   Pulse 71   Temp 98.5  F (36.9  C) (Oral)   Wt 75.8 kg (167 lb)   SpO2 100%   BMI 25.39 kg/m   Body mass index is 25.39 kg/m .  GENERAL:  alert,  no distress  Psych: normal affect       ASSESSMENT/PLAN:                                                        ICD-10-CM    1. Inattention R41.840 MENTAL HEALTH REFERRAL  - Adult; Assessments and Testing; ADHD; Other: [facility] [phone]; We will contact you to schedule the appointment or please call with any questions      Ongoing concerns with regard to inattention which has been a pervasive issue for the past few years.  Referred for ADD evaluation, did discuss a trial of medication depending on results     2. Chronic insomnia F51.04   Continue Ambien as needed     3. Hemorrhoids, internal K64.8  hematochezia likely secondary to hemorrhoids.  Recommended adding dietary fiber supplements, hemorrhoid care discussed     4. Generalized hyperhidrosis R61 aluminum chloride (DRYSOL) 20 % external solution  Refill        Mesfin Rosales MD  [facility]

## 2018-12-11 ENCOUNTER — MYC MEDICAL ADVICE (OUTPATIENT)
Dept: ORTHOPEDICS | Facility: CLINIC | Age: 38
End: 2018-12-11

## 2018-12-11 DIAGNOSIS — M54.16 RIGHT LUMBAR RADICULOPATHY: Primary | ICD-10-CM

## 2018-12-11 NOTE — TELEPHONE ENCOUNTER
Patient would like another REBECCA injection - requested via [employer]    Notes from previous injection:    Received and reviewed notes from [facility] of a successful right L5-S1 transforaminal injection completed on 8/30/18.     Of note this is the second injection patient has received.  Patient reports 95% pain relief after the injection.  Previous injection was a right L4-5 and had 45% pain relief after the injection.       Order pending     Margy Harding ATC     700

## 2018-12-12 NOTE — TELEPHONE ENCOUNTER
Please check with patient and confirm that right L5-S1 injection truly provided the most relief. Alternatively, obtain his current symptoms so that I can determine what level is the most clinically appropriate. Upon gathering this information I can then proceed to order the injection.    Shawn Meyers DO, CAM  [facility]

## 2018-12-13 NOTE — TELEPHONE ENCOUNTER
Repeat right L5-S1 TFESI ordered at [facility].    Shawn Meyers DO, VIKASH  [facility]

## 2019-01-04 ENCOUNTER — TELEPHONE (OUTPATIENT)
Dept: ORTHOPEDICS | Facility: CLINIC | Age: 39
End: 2019-01-04

## 2019-01-04 NOTE — TELEPHONE ENCOUNTER
EXAM: DIAGNOSTIC TRANSFORAMINAL LUMBAR EPIDUROGRAPHY WITH THERAPEUTIC EPIDURAL STEROID AND LOCAL ANESTHETIC INJECTION, RIGHT L5-S1    CLINICAL INFORMATION: 38-year-old male with recurrent low back and right leg pain.    DIAGNOSTIC TRANSFORAMINAL EPIDUROGRAPHY:    TECHNICAL INFORMATION/INTERPRETATION: After thorough sterile preparation and draping of the skin, a 22-gauge spinal needle was carefully advanced into the right L5-S1 neural foramen obliquely using fluoroscopic guidance. 4.0 mL of iohexol, 240 mg/mL was injected under direct live fluoroscopic observation, resulting in opacification of the nerve sheath with reflux into the epidural space to the L4-5 level. After careful review of the epidurogram, it was determined that a therapeutic epidural steroid injection is appropriate and could be safely performed.    CONCLUSION:    1. Images reveal dispersal of contrast within the epidural space as described above.    2. Based upon the epidurogram, it appears to be safe/reasonable to proceed with therapeutic epidural steroid injection at this site.    THERAPEUTIC TRANSFORAMINAL EPIDURAL STEROID INJECTION:    TECHNICAL INFORMATION/INTERPRETATION: Following review of the epidurogram, a therapeutic mixture consisting of 4.0 mL of lidocaine 1% and 1.0 mL of dexamethasone 10 mg/mL were injected. Postinjection radiographs reveal further dispersal of the previously injected contrast within the epidural space. The patient was monitored for response and described 50% relief of pain symptoms 20 minutes postinjection.    Total fluoroscopic time for the entire, combined procedure was 15 seconds. Total number of fluoroscopic images: 3.    CONCLUSION:    1. Successful therapeutic transforaminal epidural steroid injection performed without complication.    2. Initial therapeutic response to injected local anesthetic is rated R1 (partial relief of pain symptoms rated at 50%).        Electronically signed on 1/3/2019 2:50:00 PM by  Toiñto Mcintyre M.D.

## 2019-01-04 NOTE — TELEPHONE ENCOUNTER
Received and reviewed documentation of successful REBECCA completed at [facility].    Patient had 50% relief after the injection.    He has already been seen by neurosurgery and they would not recommend addressing surgically.    Will await long-term outcomes.    Shawn Meyers DO, CAM  [facility]

## 2019-01-18 ENCOUNTER — MYC MEDICAL ADVICE (OUTPATIENT)
Dept: PEDIATRICS | Facility: CLINIC | Age: 39
End: 2019-01-18

## 2019-01-18 DIAGNOSIS — M54.50 LOW BACK PAIN: Primary | ICD-10-CM

## 2019-02-25 ENCOUNTER — OFFICE VISIT (OUTPATIENT)
Dept: PEDIATRICS | Facility: CLINIC | Age: 39
End: 2019-02-25
Payer: COMMERCIAL

## 2019-02-25 VITALS
WEIGHT: 177 LBS | BODY MASS INDEX: 26.83 KG/M2 | OXYGEN SATURATION: 100 % | TEMPERATURE: 98.1 F | DIASTOLIC BLOOD PRESSURE: 60 MMHG | SYSTOLIC BLOOD PRESSURE: 104 MMHG | HEIGHT: 68 IN | HEART RATE: 96 BPM

## 2019-02-25 DIAGNOSIS — F51.04 CHRONIC INSOMNIA: ICD-10-CM

## 2019-02-25 DIAGNOSIS — F90.0 ATTENTION DEFICIT HYPERACTIVITY DISORDER (ADHD), PREDOMINANTLY INATTENTIVE TYPE: Primary | ICD-10-CM

## 2019-02-25 PROCEDURE — 99214 OFFICE O/P EST MOD 30 MIN: CPT | Performed by: INTERNAL MEDICINE

## 2019-02-25 RX ORDER — METHYLPHENIDATE HYDROCHLORIDE 18 MG/1
18 TABLET ORAL EVERY MORNING
Qty: 30 TABLET | Refills: 0 | Status: SHIPPED | OUTPATIENT
Start: 2019-02-25 | End: 2019-03-04

## 2019-02-25 ASSESSMENT — MIFFLIN-ST. JEOR: SCORE: 1702.37

## 2019-02-25 NOTE — PROGRESS NOTES
SUBJECTIVE:                                                    Edgardo Mustafa is a 38 year old male who presents to clinic today for the following health issues:    Presents today for follow-up of recent counseling visit for evaluation of attention deficit symptoms.  Counseling visit notes previously reviewed, consistent with attention deficit disorder-inattentive subtype.  Patient notes ongoing symptoms for the past few years.  He currently works in sales and notes that the symptoms are adversely affecting his ability to function at work.  Describes feeling easily distracted and having significant difficulty staying on task.  He is also completing his bachelor's degree along with working full-time-notes that he has significant difficulty completing school assignments and maintaining focus.  Has a prior history of anxiety, counseling has suggested that anxiety may be driven by ongoing ADD symptoms which have been frustrating for him.    Patient Active Problem List   Diagnosis     Generalized hyperhidrosis     Adie's pupil, left     Partial traumatic transphalangeal amputation of finger, sequela (H)     Neuropathic pain of hand, left     MARU (generalized anxiety disorder)     Chronic insomnia     Mechanical low back pain     Attention deficit hyperactivity disorder (ADHD), predominantly inattentive type     Past Surgical History:   Procedure Laterality Date     SURGICAL PATHOLOGY EXAM      repair deviated septum       Social History     Tobacco Use     Smoking status: Never Smoker     Smokeless tobacco: Never Used   Substance Use Topics     Alcohol use: Yes     Comment: rare     Family History   Problem Relation Age of Onset     Hypertension Mother      Diabetes No family hx of      Coronary Artery Disease No family hx of      Cancer - colorectal No family hx of      Prostate Cancer No family hx of          Current Outpatient Medications   Medication Sig Dispense Refill             pilocarpine (PILOCAR) 1 %  "ophthalmic solution Place 1 drop Into the left eye 2 times daily 1 Bottle 3     zolpidem (AMBIEN) 10 MG tablet One half to one tab QHS prn 90 tablet 1        OBJECTIVE:                                                    /60 (Cuff Size: Adult Regular)   Pulse 96   Temp 98.1  F (36.7  C) (Oral)   Ht 1.727 m (5' 8\")   Wt 80.3 kg (177 lb)   SpO2 100%   BMI 26.91 kg/m   Body mass index is 26.91 kg/m .  GENERAL:  alert,  no distress  Psychiatric: Normal affect.     ASSESSMENT/PLAN:                                                        ICD-10-CM    1. Attention deficit hyperactivity disorder (ADHD), predominantly inattentive type F90.0 methylphenidate (CONCERTA) 18 MG CR tablet       New diagnosis.  Start trial of Concerta 18 mg daily and return for follow-up in 3 weeks.  Side effects reviewed-specifically appetite suppression and insomnia.     2. Chronic insomnia F51.04  history of chronic insomnia, has been unable to wean off zolpidem-continue current regimen.  Patient understands that sleep may worsen somewhat with the addition of stimulant medication        Mesfin Rosales MD  [facility]    "

## 2019-03-01 ENCOUNTER — TELEPHONE (OUTPATIENT)
Dept: PEDIATRICS | Facility: CLINIC | Age: 39
End: 2019-03-01

## 2019-03-01 DIAGNOSIS — F90.0 ATTENTION DEFICIT HYPERACTIVITY DISORDER (ADHD), PREDOMINANTLY INATTENTIVE TYPE: Primary | ICD-10-CM

## 2019-03-01 NOTE — TELEPHONE ENCOUNTER
"Adult Nutrition  Assessment/PES    Patient Name:  Yolanda Irvin  YOB: 1948  MRN: [id number]  Admit Date:  5/25/2017    Assessment Date:  6/5/2017        Reason for Assessment       06/05/Patient calling to report that his head feels a little dizzy that comes on after taking the Concerta but not a problem reports his head feels little cloudy during the day.  He reports he had this with starting another medication and that it went away once used to the medication.    Patient reported that his left eye has been red  Since starting medication, Reports that he has an \"80's pupil\", states that he noticed today a few minutes after taking the medication his eye was red where it has not been previously.  Takes Pilocarpine drops at night due to giving headache and uses other drops during the day that don't cause headache that are little different.  States used visine to see if that went away.  Brimonidine drops that made the redness go in half. Slightly itchy from being red.      Denies: eye pain, vision changes, eye swelling    Medication not helping with focus and concentration and due to concerns with Eye advised patient to stop medication until he hears back from provider Monday.    Advised if having any vision changes or eye pain to seek Care sooner not wait for PCP patient agreed with plan.    Jenelle CARTAGENA RN - Triage  [facility]    " 

## 2019-03-01 NOTE — TELEPHONE ENCOUNTER
Reason for call:  Other   Patient called regarding (reason for call): call back  Additional comments: Please call patient back regarding side effects of Concerta. His eyes are red and he is dizzy.    Phone number to reach patient:  Home number on file [phone] (home)    Best Time:  ASAP    Can we leave a detailed message on this number?  YES

## 2019-03-04 RX ORDER — DEXTROAMPHETAMINE SACCHARATE, AMPHETAMINE ASPARTATE MONOHYDRATE, DEXTROAMPHETAMINE SULFATE AND AMPHETAMINE SULFATE 2.5; 2.5; 2.5; 2.5 MG/1; MG/1; MG/1; MG/1
10 CAPSULE, EXTENDED RELEASE ORAL DAILY
Qty: 7 CAPSULE | Refills: 0 | Status: SHIPPED | OUTPATIENT
Start: 2019-03-04 | End: 2019-04-18

## 2019-03-04 NOTE — TELEPHONE ENCOUNTER
Discussed with patient.  Symptoms reviewed.  Patient states that his eye symptoms resolved after discontinuing Concerta.      Start trial of Adderall XR 10 mg.  Patient will follow-up by phone in the next week and plan to titrate medication if tolerated

## 2019-03-11 NOTE — TELEPHONE ENCOUNTER
Please call pt.   Increase adderall XR to 20mg daily (he can take 2 of the 10mg tablets each morning).   Please have pt call with update in a week or so

## 2019-03-11 NOTE — TELEPHONE ENCOUNTER
I spoke with patient and he is not experiencing any side effects from the Adderall.  However, he hasn't noticed any improvement in concentration.   Difficult to concentrate and focus, and notes that he feels more tired at the end of the day.  Asking about increasing the dose?  Please advise.  MITZI Tomlinson RN

## 2019-03-11 NOTE — TELEPHONE ENCOUNTER
Patient notified of directions below.  Has only one pill left.  Order pended.    Adderall XR 10 mg      Please call patient when RX is ready at the .  Would like to  tomorrow.  Last Written Prescription Date:  03/04/19  Last Fill Quantity: 7,   # refills: 0  Last Office Visit: 02/25/19  Future Office visit:       Routing refill request to provider for review/approval because:  Drug not on the [facility], [facility] or [facility] refill protocol or controlled substance  MITZI Tomlinson RN

## 2019-03-11 NOTE — TELEPHONE ENCOUNTER
Patient calling to speak with the nurse. He was doing a week trial on a new medication Adderal 10mg and he is calling in to check with the nurse to see what he should do next. He states that he has not really noticed much of a difference and he is wondering if that is because it is a low dose? Please call him back and okay to leave a detailed vm.

## 2019-03-12 RX ORDER — DEXTROAMPHETAMINE SACCHARATE, AMPHETAMINE ASPARTATE MONOHYDRATE, DEXTROAMPHETAMINE SULFATE AND AMPHETAMINE SULFATE 5; 5; 5; 5 MG/1; MG/1; MG/1; MG/1
20 CAPSULE, EXTENDED RELEASE ORAL DAILY
Qty: 30 CAPSULE | Refills: 0 | Status: SHIPPED | OUTPATIENT
Start: 2019-03-12 | End: 2019-04-09

## 2019-03-18 DIAGNOSIS — F51.04 CHRONIC INSOMNIA: ICD-10-CM

## 2019-03-18 RX ORDER — ZOLPIDEM TARTRATE 10 MG/1
TABLET ORAL
Qty: 90 TABLET | Refills: 1 | Status: SHIPPED | OUTPATIENT
Start: 2019-03-18 | End: 2019-08-22

## 2019-03-18 NOTE — TELEPHONE ENCOUNTER
zolpidem (AMBIEN) 10 MG tablet      Last Written Prescription Date:  10/11/2018  Last Fill Quantity: 90 tablet,   # refills: 1  Last Office Visit: 02/25/2019 Mesfin Rosales MD  Future Office visit:       Routing refill request to provider for review/approval because:  Drug not on the [facility], [facility] or [facility] refill protocol or controlled substance

## 2019-04-09 DIAGNOSIS — F90.0 ATTENTION DEFICIT HYPERACTIVITY DISORDER (ADHD), PREDOMINANTLY INATTENTIVE TYPE: ICD-10-CM

## 2019-04-09 RX ORDER — DEXTROAMPHETAMINE SACCHARATE, AMPHETAMINE ASPARTATE MONOHYDRATE, DEXTROAMPHETAMINE SULFATE AND AMPHETAMINE SULFATE 5; 5; 5; 5 MG/1; MG/1; MG/1; MG/1
20 CAPSULE, EXTENDED RELEASE ORAL DAILY
Qty: 30 CAPSULE | Refills: 0 | Status: SHIPPED | OUTPATIENT
Start: 2019-04-09 | End: 2019-08-22

## 2019-04-09 NOTE — TELEPHONE ENCOUNTER
Requested Prescriptions   Pending Prescriptions Disp Refills     amphetamine-dextroamphetamine (ADDERALL XR) 20 MG 24 hr capsule  Last Written Prescription Date:  03/12/2019  Last Fill Quantity: 30 capsule,  # refills: 0    Last office visit: 2/25/2019 with prescribing provider:  Mesfin Rosales MD        Future Office Visit:   Next 5 appointments (look out 90 days)    Apr 18, 2019  3:00 PM CDT  SHORT with Mesfin Rosales MD  [facility] ([address] [id number] [phone]          30 capsule 0     Sig: Take 1 capsule (20 mg) by mouth daily       There is no refill protocol information for this order     Routing refill request to provider for review/approval because:  Drug not on the [facility], P or [facility] refill protocol or controlled substance

## 2019-04-09 NOTE — TELEPHONE ENCOUNTER
Requested Prescriptions   Pending Prescriptions Disp Refills     amphetamine-dextroamphetamine (ADDERALL XR) 20 MG 24 hr capsule 30 capsule 0     Sig: Take 1 capsule (20 mg) by mouth daily       There is no refill protocol information for this order        Patient made his follow up appointment in regards to the medication for 4/18. Patient requesting a refill that will hold him over until his visit.    Bria Gonzalez  FWF - TC/FD  4/9/2019 8:20 AM

## 2019-04-18 ENCOUNTER — OFFICE VISIT (OUTPATIENT)
Dept: PEDIATRICS | Facility: CLINIC | Age: 39
End: 2019-04-18
Payer: COMMERCIAL

## 2019-04-18 VITALS
SYSTOLIC BLOOD PRESSURE: 120 MMHG | WEIGHT: 159 LBS | OXYGEN SATURATION: 100 % | HEIGHT: 68 IN | DIASTOLIC BLOOD PRESSURE: 60 MMHG | BODY MASS INDEX: 24.1 KG/M2 | TEMPERATURE: 98.6 F | HEART RATE: 102 BPM

## 2019-04-18 DIAGNOSIS — B36.0 TINEA VERSICOLOR: ICD-10-CM

## 2019-04-18 DIAGNOSIS — F90.0 ATTENTION DEFICIT HYPERACTIVITY DISORDER (ADHD), PREDOMINANTLY INATTENTIVE TYPE: Primary | ICD-10-CM

## 2019-04-18 DIAGNOSIS — F51.04 CHRONIC INSOMNIA: ICD-10-CM

## 2019-04-18 DIAGNOSIS — N52.9 ERECTILE DYSFUNCTION, UNSPECIFIED ERECTILE DYSFUNCTION TYPE: ICD-10-CM

## 2019-04-18 PROCEDURE — 99214 OFFICE O/P EST MOD 30 MIN: CPT | Performed by: INTERNAL MEDICINE

## 2019-04-18 RX ORDER — FLUCONAZOLE 150 MG/1
TABLET ORAL
Qty: 2 TABLET | Refills: 0 | Status: SHIPPED | OUTPATIENT
Start: 2019-04-18 | End: 2019-10-09

## 2019-04-18 RX ORDER — DEXTROAMPHETAMINE SACCHARATE, AMPHETAMINE ASPARTATE MONOHYDRATE, DEXTROAMPHETAMINE SULFATE AND AMPHETAMINE SULFATE 5; 5; 5; 5 MG/1; MG/1; MG/1; MG/1
20 CAPSULE, EXTENDED RELEASE ORAL DAILY
Qty: 30 CAPSULE | Refills: 0 | Status: CANCELLED | OUTPATIENT
Start: 2019-04-18

## 2019-04-18 RX ORDER — TADALAFIL 10 MG/1
10 TABLET ORAL DAILY PRN
Qty: 6 TABLET | Refills: 6 | Status: SHIPPED | OUTPATIENT
Start: 2019-04-18 | End: 2019-04-29

## 2019-04-18 RX ORDER — DEXTROAMPHETAMINE SACCHARATE, AMPHETAMINE ASPARTATE MONOHYDRATE, DEXTROAMPHETAMINE SULFATE AND AMPHETAMINE SULFATE 2.5; 2.5; 2.5; 2.5 MG/1; MG/1; MG/1; MG/1
10 CAPSULE, EXTENDED RELEASE ORAL DAILY
Qty: 7 CAPSULE | Refills: 0 | Status: CANCELLED | OUTPATIENT
Start: 2019-04-18

## 2019-04-18 ASSESSMENT — MIFFLIN-ST. JEOR: SCORE: 1620.72

## 2019-04-18 NOTE — PROGRESS NOTES
SUBJECTIVE:                                                    Edgardo Mustafa is a 38 year old male who presents to clinic today for the following health issues:    ADD follow-up.  Since most recent visit, patient has continued Adderall XR 20 mg daily.  Patient notes initial jitteriness on the 20 mg dose for about 2 weeks.  However since that time has noted significant improvement in ADD-inattentive symptoms.  Feels he is better able to focus and complete tasks at work and is satisfied with results on the current regimen.  Some mild worsening of underlying insomnia and mild appetite suppression.    Complains of recurrent groin/inguinal rash.  Previously evaluated by dermatology in 2017 for similar rash diagnosed with tinea versicolor.  Patient states the tinea versicolor resolved entirely with 1 dose of oral antifungal.  Topical antifungals were not effective.    History of chronic insomnia.  Patient continues on chronic zolpidem for the past several years.  Unfortunately has not been able to taper off zolpidem.  The current regimen works well and patient denies side effects.    Concerns regarding erectile dysfunction.  States that symptoms are slightly worse since starting Adderall.  Did use Cialis a few years ago which worked well.  Requesting Cialis.        Patient Active Problem List   Diagnosis     Generalized hyperhidrosis     Adie's pupil, left     Partial traumatic transphalangeal amputation of finger, sequela (H)     Neuropathic pain of hand, left     MARU (generalized anxiety disorder)     Chronic insomnia     Mechanical low back pain     Attention deficit hyperactivity disorder (ADHD), predominantly inattentive type     Past Surgical History:   Procedure Laterality Date     SURGICAL PATHOLOGY EXAM      repair deviated septum       Social History     Tobacco Use     Smoking status: Never Smoker     Smokeless tobacco: Never Used   Substance Use Topics     Alcohol use: Yes     Comment: rare     Family  "History   Problem Relation Age of Onset     Hypertension Mother      Diabetes No family hx of      Coronary Artery Disease No family hx of      Cancer - colorectal No family hx of      Prostate Cancer No family hx of          Current Outpatient Medications   Medication Sig Dispense Refill     amphetamine-dextroamphetamine (ADDERALL XR) 20 MG 24 hr capsule Take 1 capsule (20 mg) by mouth daily 30 capsule 0     fluconazole (DIFLUCAN) 150 MG tablet Take 2 tablets once 2 tablet 0     pilocarpine (PILOCAR) 1 % ophthalmic solution Place 1 drop Into the left eye 2 times daily 1 Bottle 3     tadalafil (CIALIS) 10 MG tablet Take 1 tablet (10 mg) by mouth daily as needed 6 tablet 6     zolpidem (AMBIEN) 10 MG tablet One half to one tab QHS prn 90 tablet 1       ROS: The following systems have been completely reviewed and are negative except as noted in the HPI: CONSTITUTIONAL, CARDIOVASCULAR, PULMONARY, GENITOURINARY    OBJECTIVE:                                                    /60 (BP Location: Right arm, Patient Position: Chair, Cuff Size: Adult Regular)   Pulse 102   Temp 98.6  F (37  C) (Oral)   Ht 1.727 m (5' 8\")   Wt 72.1 kg (159 lb)   SpO2 100%   BMI 24.18 kg/m   Body mass index is 24.18 kg/m .  GENERAL:  alert,  no distress  NECK: no tenderness, no adenopathy  RESP: lungs clear to auscultation - no rales, no rhonchi, no wheezes  CV: regular rates and rhythm, normal S1 S2, no S3 or S4 and no murmur, no click or rub  MS: extremities- no edema       ASSESSMENT/PLAN:                                                        ICD-10-CM    1. Attention deficit hyperactivity disorder (ADHD), predominantly inattentive type F90.0  adequate control, continue Adderall XR 20 mg daily.  Patient will contact clinic in the next few weeks for 3-month scripts, may call in for subsequent 3-months and then plan for clinic follow-up at the 6-month shayan.     2. Tinea versicolor B36.0 fluconazole (DIFLUCAN) 150 MG " tablet  Recurrent rash, fluconazole 300 mg x 1     3. Chronic insomnia F51.04 Continues to work on sleep hygiene, continue Ambien as needed     4. Erectile dysfunction, unspecified erectile dysfunction type N52.9 tadalafil (CIALIS) 10 MG tablet      Side effects reviewed, start tadalafil as needed.  Patient understands this may not be covered by insurance, discussed generic sildenafil 20 mg tablets as an alternative      Mesfin Rosales MD  [facility]

## 2019-04-29 ENCOUNTER — TELEPHONE (OUTPATIENT)
Dept: PEDIATRICS | Facility: CLINIC | Age: 39
End: 2019-04-29

## 2019-04-29 DIAGNOSIS — N52.9 ERECTILE DYSFUNCTION, UNSPECIFIED ERECTILE DYSFUNCTION TYPE: Primary | ICD-10-CM

## 2019-04-29 RX ORDER — SILDENAFIL CITRATE 20 MG/1
TABLET ORAL
Qty: 40 TABLET | Refills: 6 | Status: SHIPPED | OUTPATIENT
Start: 2019-04-29 | End: 2019-07-24

## 2019-04-29 NOTE — TELEPHONE ENCOUNTER
Patient would like generic sildenafil 20mg tabs ordered as his insurance will not cover cialis. Please prescribe and send to selected pharmacy. Also notes that he needs a PA for his zolpidem as insurance will only cover a certain amount during a time frame. Pharmacy sending PA request.  -Shey Valadez

## 2019-05-09 ENCOUNTER — TELEPHONE (OUTPATIENT)
Dept: PEDIATRICS | Facility: CLINIC | Age: 39
End: 2019-05-09

## 2019-05-09 NOTE — TELEPHONE ENCOUNTER
Prior Authorization Retail Medication Request    Medication/Dose:   ICD code (if different than what is on RX):    Previously Tried and Failed:    Rationale:      Insurance Name:  [employer]  Insurance ID:  [id number]      Pharmacy Information (if different than what is on RX)  [facility]  Phone:  [phone]  [place] in pharmacy-his insurance will only cover qty 45 in 75 days

## 2019-05-14 NOTE — TELEPHONE ENCOUNTER
Central Prior Authorization Team   Phone: [phone]      PA Initiation    Medication: zolpidem 10mg-Initiated  Insurance Company: [employer] Phone [phone] Fax [phone]  [facility] [address] [facility] [place] [facility] [address] AT [facility]  Filling Pharmacy Phone: [phone]  Filling Pharmacy Fax:    Start Date: 5/14/2019

## 2019-05-14 NOTE — TELEPHONE ENCOUNTER
Prior Authorization Approval    Authorization Effective Date: 4/14/2019  Authorization Expiration Date: 5/13/2022  Medication: zolpidem 10mg-APPROVED  Approved Dose/Quantity:   Reference #:     Insurance Company: [employer] [phone] Fax [phone]  Expected CoPay:       CoPay Card Available:      Foundation Assistance Needed:    Which Pharmacy is filling the prescription (Not needed for infusion/clinic administered): [facility] [id number] [place] [address] - [address] AT [facility]  Pharmacy Notified: Yes  Patient Notified: No    Pharmacy will notify patient when medication is ready.

## 2019-07-23 ENCOUNTER — MYC MEDICAL ADVICE (OUTPATIENT)
Dept: PEDIATRICS | Facility: CLINIC | Age: 39
End: 2019-07-23

## 2019-07-23 DIAGNOSIS — N52.9 ERECTILE DYSFUNCTION, UNSPECIFIED ERECTILE DYSFUNCTION TYPE: ICD-10-CM

## 2019-07-24 RX ORDER — SILDENAFIL CITRATE 20 MG/1
TABLET ORAL
Qty: 40 TABLET | Refills: 6 | Status: SHIPPED | OUTPATIENT
Start: 2019-07-24 | End: 2019-08-09

## 2019-07-24 NOTE — TELEPHONE ENCOUNTER
Dr sapp  Is there a sildenafil that is covered or less money for out of pocket?  I can send thru PA as well.   Thanks!     Jenelle Forrest RN

## 2019-07-29 ENCOUNTER — TELEPHONE (OUTPATIENT)
Dept: PEDIATRICS | Facility: CLINIC | Age: 39
End: 2019-07-29

## 2019-07-29 DIAGNOSIS — N52.9 ERECTILE DYSFUNCTION, UNSPECIFIED ERECTILE DYSFUNCTION TYPE: ICD-10-CM

## 2019-07-29 NOTE — TELEPHONE ENCOUNTER
Prior Authorization Retail Medication Request    Medication/Dose: sildenafil 20mg  ICD code (if different than what is on RX):      Previously Tried and Failed:    Rationale:      Insurance Name:  [employer]  Insurance ID:  [id number]      Pharmacy Information (if different than what is on RX)  Name:  [facility]  Phone:  [phone]

## 2019-08-05 ENCOUNTER — MYC MEDICAL ADVICE (OUTPATIENT)
Dept: PEDIATRICS | Facility: CLINIC | Age: 39
End: 2019-08-05

## 2019-08-08 NOTE — TELEPHONE ENCOUNTER
Central Prior Authorization Team   Phone: [phone]      PA Initiation    Medication: sidenafil 20mg  Insurance Company: [employer] - Phone [phone] Fax [phone]  Pharmacy Filling the Rx: [id number] - [place], [place] - [address] AT [facility]  Filling Pharmacy Phone: [phone]  Filling Pharmacy Fax:    Start Date: 8/8/2019

## 2019-08-09 RX ORDER — SILDENAFIL CITRATE 20 MG/1
TABLET ORAL
Qty: 40 TABLET | Refills: 6 | Status: SHIPPED | OUTPATIENT
Start: 2019-08-09 | End: 2019-12-31

## 2019-08-09 NOTE — TELEPHONE ENCOUNTER
Telephone call placed to patient, left a voice message with request for return call.     Patient can talk to any available triage nurse.

## 2019-08-09 NOTE — TELEPHONE ENCOUNTER
"Pt called back.    Pt agrees to plan of resending prescription to the [facility] pharmacy, await for possible discount, and contact the pt.    Rx sent to pharmacy downstairs, still requires a PA to be pushed through. I'm unsure how to do that. Pharmacy was called and waiting for it.    - Lv \"Jason\" LACEY Martinez  Triage [facility]    "

## 2019-08-09 NOTE — TELEPHONE ENCOUNTER
PA was previously sent and denied.  Patient will need to pay out of pocket.  Called and left VM to contact clinic.    Becki Mcintyre, CMA

## 2019-08-09 NOTE — TELEPHONE ENCOUNTER
PRIOR AUTHORIZATION DENIED    Medication: sidenafil 20mg    Denial Date: 8/9/2019    Denial Rational:      Appeal Information:    If you would like to appeal, please supply P/A team with a letter of medical necessity with clinical reason.

## 2019-08-09 NOTE — TELEPHONE ENCOUNTER
Typically the cheapest way to get this med is to pay cash down in our pharmacy here. Ok to transfer script downstairs unless pt knows of a better covered alternative.

## 2019-08-09 NOTE — TELEPHONE ENCOUNTER
Please have pt call to ask if any erectile dysfunction meds are covered. If not, he should simply NOT go through insurance and do cash pay for the script sent downstairs.

## 2019-08-12 NOTE — TELEPHONE ENCOUNTER
2nd attempt. LVM for patient to return phone call to clinic, see message below.     Sherrell Rich, CMA

## 2019-08-16 NOTE — TELEPHONE ENCOUNTER
Unable to reach patient via telephone MC message regarding PA.     Opal Kate MA// August 16, 2019 3:29 PM

## 2019-08-22 DIAGNOSIS — F90.0 ATTENTION DEFICIT HYPERACTIVITY DISORDER (ADHD), PREDOMINANTLY INATTENTIVE TYPE: ICD-10-CM

## 2019-08-22 DIAGNOSIS — F51.04 CHRONIC INSOMNIA: ICD-10-CM

## 2019-08-22 RX ORDER — ZOLPIDEM TARTRATE 10 MG/1
TABLET ORAL
Qty: 90 TABLET | Refills: 1 | Status: SHIPPED | OUTPATIENT
Start: 2019-08-22 | End: 2020-03-12

## 2019-08-22 RX ORDER — DEXTROAMPHETAMINE SACCHARATE, AMPHETAMINE ASPARTATE MONOHYDRATE, DEXTROAMPHETAMINE SULFATE AND AMPHETAMINE SULFATE 5; 5; 5; 5 MG/1; MG/1; MG/1; MG/1
20 CAPSULE, EXTENDED RELEASE ORAL DAILY
Qty: 30 CAPSULE | Refills: 0 | Status: SHIPPED | OUTPATIENT
Start: 2019-08-22 | End: 2019-10-09

## 2019-08-22 NOTE — TELEPHONE ENCOUNTER
Pending Prescriptions:                       Disp   Refills    amphetamine-dextroamphetamine (ADDERALL X*30 cap*0            Sig: Take 1 capsule (20 mg) by mouth daily    zolpidem (AMBIEN) 10 MG tablet            90 tab*1            Sig: One half to one tab QHS prn    Pt states that the Adderall only has effect on him for half of the day and then wears off making pt really tired. Pt states that he is taking the medication later and later meaning 1pm or later due to this side effect. Pt will be out of this medication by Sunday but wanted to possibly see or have provider advise on what to do about this further.     Pt will be out of Ambien before appt on 10/9/19 which is the soonest he can be seen by provider.     Ok detailed messaged.

## 2019-08-22 NOTE — TELEPHONE ENCOUNTER
Ok for refill.  Please call pt.     If concerns regarding changing regimen, a follow-up telephone visit or office visit would be best to discuss

## 2019-09-23 DIAGNOSIS — F90.0 ATTENTION DEFICIT HYPERACTIVITY DISORDER (ADHD), PREDOMINANTLY INATTENTIVE TYPE: ICD-10-CM

## 2019-09-23 RX ORDER — DEXTROAMPHETAMINE SACCHARATE, AMPHETAMINE ASPARTATE MONOHYDRATE, DEXTROAMPHETAMINE SULFATE AND AMPHETAMINE SULFATE 5; 5; 5; 5 MG/1; MG/1; MG/1; MG/1
20 CAPSULE, EXTENDED RELEASE ORAL DAILY
Qty: 30 CAPSULE | Refills: 0 | Status: CANCELLED | OUTPATIENT
Start: 2019-09-23

## 2019-09-24 NOTE — TELEPHONE ENCOUNTER
Adderall XR 20 mg      Last Written Prescription Date:  8/22/19  Last Fill Quantity: 30,   # refills: 0  Last Office Visit: 4/18/19  Future Office visit:    Next 5 appointments (look out 90 days)    Oct 09, 2019  7:05 AM CDT  Adult physical with Mesfin Rosales MD, EA EXAM ROOM 20  [facility] ([facility]) [address] [place] [id number] [phone]           Routing refill request to provider for review/approval because:  Drug not on the [facility], [facility] or [facility] refill protocol or controlled substance    Jyoti, RN  Triage Nurse

## 2019-09-25 RX ORDER — DEXTROAMPHETAMINE SACCHARATE, AMPHETAMINE ASPARTATE MONOHYDRATE, DEXTROAMPHETAMINE SULFATE AND AMPHETAMINE SULFATE 5; 5; 5; 5 MG/1; MG/1; MG/1; MG/1
20 CAPSULE, EXTENDED RELEASE ORAL DAILY
Qty: 30 CAPSULE | Refills: 0 | Status: SHIPPED | OUTPATIENT
Start: 2019-10-26 | End: 2019-10-09

## 2019-09-25 RX ORDER — DEXTROAMPHETAMINE SACCHARATE, AMPHETAMINE ASPARTATE MONOHYDRATE, DEXTROAMPHETAMINE SULFATE AND AMPHETAMINE SULFATE 5; 5; 5; 5 MG/1; MG/1; MG/1; MG/1
20 CAPSULE, EXTENDED RELEASE ORAL DAILY
Qty: 30 CAPSULE | Refills: 0 | Status: SHIPPED | OUTPATIENT
Start: 2019-11-26 | End: 2019-10-09

## 2019-09-25 RX ORDER — DEXTROAMPHETAMINE SACCHARATE, AMPHETAMINE ASPARTATE MONOHYDRATE, DEXTROAMPHETAMINE SULFATE AND AMPHETAMINE SULFATE 5; 5; 5; 5 MG/1; MG/1; MG/1; MG/1
20 CAPSULE, EXTENDED RELEASE ORAL DAILY
Qty: 30 CAPSULE | Refills: 0 | Status: SHIPPED | OUTPATIENT
Start: 2019-09-25 | End: 2019-10-09

## 2019-10-09 ENCOUNTER — OFFICE VISIT (OUTPATIENT)
Dept: PEDIATRICS | Facility: CLINIC | Age: 39
End: 2019-10-09
Payer: COMMERCIAL

## 2019-10-09 VITALS
WEIGHT: 157 LBS | TEMPERATURE: 98 F | HEIGHT: 68 IN | SYSTOLIC BLOOD PRESSURE: 102 MMHG | HEART RATE: 98 BPM | BODY MASS INDEX: 23.79 KG/M2 | DIASTOLIC BLOOD PRESSURE: 68 MMHG | OXYGEN SATURATION: 99 %

## 2019-10-09 DIAGNOSIS — F90.0 ATTENTION DEFICIT HYPERACTIVITY DISORDER (ADHD), PREDOMINANTLY INATTENTIVE TYPE: ICD-10-CM

## 2019-10-09 DIAGNOSIS — K58.1 IRRITABLE BOWEL SYNDROME WITH CONSTIPATION: ICD-10-CM

## 2019-10-09 DIAGNOSIS — Z00.00 ROUTINE GENERAL MEDICAL EXAMINATION AT A HEALTH CARE FACILITY: Primary | ICD-10-CM

## 2019-10-09 DIAGNOSIS — B36.0 TINEA VERSICOLOR: ICD-10-CM

## 2019-10-09 DIAGNOSIS — K21.9 GASTROESOPHAGEAL REFLUX DISEASE WITHOUT ESOPHAGITIS: ICD-10-CM

## 2019-10-09 DIAGNOSIS — Z13.6 CARDIOVASCULAR SCREENING; LDL GOAL LESS THAN 160: ICD-10-CM

## 2019-10-09 DIAGNOSIS — M51.369 DDD (DEGENERATIVE DISC DISEASE), LUMBAR: ICD-10-CM

## 2019-10-09 DIAGNOSIS — H69.92 DYSFUNCTION OF LEFT EUSTACHIAN TUBE: ICD-10-CM

## 2019-10-09 LAB
ANION GAP SERPL CALCULATED.3IONS-SCNC: 7 MMOL/L (ref 3–14)
BUN SERPL-MCNC: 14 MG/DL (ref 7–30)
CALCIUM SERPL-MCNC: 9.1 MG/DL (ref 8.5–10.1)
CHLORIDE SERPL-SCNC: 106 MMOL/L (ref 94–109)
CHOLEST SERPL-MCNC: 165 MG/DL
CO2 SERPL-SCNC: 28 MMOL/L (ref 20–32)
CREAT SERPL-MCNC: 0.8 MG/DL (ref 0.66–1.25)
GFR SERPL CREATININE-BSD FRML MDRD: >90 ML/MIN/{1.73_M2}
GLUCOSE SERPL-MCNC: 86 MG/DL (ref 70–99)
HDLC SERPL-MCNC: 59 MG/DL
LDLC SERPL CALC-MCNC: 98 MG/DL
NONHDLC SERPL-MCNC: 106 MG/DL
POTASSIUM SERPL-SCNC: 4.6 MMOL/L (ref 3.4–5.3)
SODIUM SERPL-SCNC: 141 MMOL/L (ref 133–144)
TRIGL SERPL-MCNC: 40 MG/DL

## 2019-10-09 PROCEDURE — 80061 LIPID PANEL: CPT | Performed by: INTERNAL MEDICINE

## 2019-10-09 PROCEDURE — 90686 IIV4 VACC NO PRSV 0.5 ML IM: CPT | Performed by: INTERNAL MEDICINE

## 2019-10-09 PROCEDURE — 99213 OFFICE O/P EST LOW 20 MIN: CPT | Mod: 25 | Performed by: INTERNAL MEDICINE

## 2019-10-09 PROCEDURE — 36415 COLL VENOUS BLD VENIPUNCTURE: CPT | Performed by: INTERNAL MEDICINE

## 2019-10-09 PROCEDURE — 99395 PREV VISIT EST AGE 18-39: CPT | Mod: 25 | Performed by: INTERNAL MEDICINE

## 2019-10-09 PROCEDURE — 90471 IMMUNIZATION ADMIN: CPT | Performed by: INTERNAL MEDICINE

## 2019-10-09 PROCEDURE — 80048 BASIC METABOLIC PNL TOTAL CA: CPT | Performed by: INTERNAL MEDICINE

## 2019-10-09 RX ORDER — DEXTROAMPHETAMINE SACCHARATE, AMPHETAMINE ASPARTATE, DEXTROAMPHETAMINE SULFATE AND AMPHETAMINE SULFATE 2.5; 2.5; 2.5; 2.5 MG/1; MG/1; MG/1; MG/1
10 TABLET ORAL 2 TIMES DAILY
Qty: 60 TABLET | Refills: 0 | Status: SHIPPED | OUTPATIENT
Start: 2019-10-09 | End: 2019-12-31

## 2019-10-09 RX ORDER — FLUCONAZOLE 150 MG/1
TABLET ORAL
Qty: 2 TABLET | Refills: 0 | Status: SHIPPED | OUTPATIENT
Start: 2019-10-09 | End: 2019-12-31

## 2019-10-09 SDOH — HEALTH STABILITY: MENTAL HEALTH
STRESS IS WHEN SOMEONE FEELS TENSE, NERVOUS, ANXIOUS, OR CAN'T SLEEP AT NIGHT BECAUSE THEIR MIND IS TROUBLED. HOW STRESSED ARE YOU?: TO SOME EXTENT

## 2019-10-09 SDOH — ECONOMIC STABILITY: INCOME INSECURITY: HOW HARD IS IT FOR YOU TO PAY FOR THE VERY BASICS LIKE FOOD, HOUSING, MEDICAL CARE, AND HEATING?: NOT HARD AT ALL

## 2019-10-09 SDOH — SOCIAL STABILITY: SOCIAL NETWORK
IN A TYPICAL WEEK, HOW MANY TIMES DO YOU TALK ON THE PHONE WITH FAMILY, FRIENDS, OR NEIGHBORS?: MORE THAN THREE TIMES A WEEK

## 2019-10-09 SDOH — ECONOMIC STABILITY: TRANSPORTATION INSECURITY
IN THE PAST 12 MONTHS, HAS LACK OF TRANSPORTATION KEPT YOU FROM MEETINGS, WORK, OR FROM GETTING THINGS NEEDED FOR DAILY LIVING?: NO

## 2019-10-09 SDOH — SOCIAL STABILITY: SOCIAL NETWORK
DO YOU BELONG TO ANY CLUBS OR ORGANIZATIONS SUCH AS CHURCH GROUPS UNIONS, FRATERNAL OR ATHLETIC GROUPS, OR SCHOOL GROUPS?: YES

## 2019-10-09 SDOH — HEALTH STABILITY: PHYSICAL HEALTH: ON AVERAGE, HOW MANY MINUTES DO YOU ENGAGE IN EXERCISE AT THIS LEVEL?: 10 MIN

## 2019-10-09 SDOH — SOCIAL STABILITY: SOCIAL NETWORK: HOW OFTEN DO YOU ATTEND CHURCH OR RELIGIOUS SERVICES?: MORE THAN 4 TIMES PER YEAR

## 2019-10-09 SDOH — SOCIAL STABILITY: SOCIAL NETWORK: HOW OFTEN DO YOU ATTENT MEETINGS OF THE CLUB OR ORGANIZATION YOU BELONG TO?: MORE THAN 4 TIMES PER YEAR

## 2019-10-09 SDOH — SOCIAL STABILITY: SOCIAL NETWORK: HOW OFTEN DO YOU GET TOGETHER WITH FRIENDS OR RELATIVES?: ONCE A WEEK

## 2019-10-09 SDOH — SOCIAL STABILITY: SOCIAL NETWORK: ARE YOU MARRIED, WIDOWED, DIVORCED, SEPARATED, NEVER MARRIED, OR LIVING WITH A PARTNER?: MARRIED

## 2019-10-09 SDOH — ECONOMIC STABILITY: FOOD INSECURITY: WITHIN THE PAST 12 MONTHS, YOU WORRIED THAT YOUR FOOD WOULD RUN OUT BEFORE YOU GOT MONEY TO BUY MORE.: NEVER TRUE

## 2019-10-09 SDOH — HEALTH STABILITY: MENTAL HEALTH: HOW MANY STANDARD DRINKS CONTAINING ALCOHOL DO YOU HAVE ON A TYPICAL DAY?: PATIENT DECLINED

## 2019-10-09 SDOH — HEALTH STABILITY: MENTAL HEALTH: HOW OFTEN DO YOU HAVE A DRINK CONTAINING ALCOHOL?: NEVER

## 2019-10-09 SDOH — ECONOMIC STABILITY: FOOD INSECURITY: WITHIN THE PAST 12 MONTHS, THE FOOD YOU BOUGHT JUST DIDN'T LAST AND YOU DIDN'T HAVE MONEY TO GET MORE.: NEVER TRUE

## 2019-10-09 SDOH — HEALTH STABILITY: MENTAL HEALTH: HOW OFTEN DO YOU HAVE 6 OR MORE DRINKS ON ONE OCCASION?: NEVER

## 2019-10-09 SDOH — HEALTH STABILITY: PHYSICAL HEALTH: ON AVERAGE, HOW MANY DAYS PER WEEK DO YOU ENGAGE IN MODERATE TO STRENUOUS EXERCISE (LIKE A BRISK WALK)?: 1 DAY

## 2019-10-09 SDOH — ECONOMIC STABILITY: TRANSPORTATION INSECURITY
IN THE PAST 12 MONTHS, HAS THE LACK OF TRANSPORTATION KEPT YOU FROM MEDICAL APPOINTMENTS OR FROM GETTING MEDICATIONS?: NO

## 2019-10-09 ASSESSMENT — ENCOUNTER SYMPTOMS
EYE PAIN: 1
CONSTIPATION: 1
FEVER: 0
SORE THROAT: 0
NERVOUS/ANXIOUS: 1
WEAKNESS: 0
DIARRHEA: 1
CHILLS: 0
HEARTBURN: 0
COUGH: 0
NAUSEA: 0
DYSURIA: 0
FREQUENCY: 0
SHORTNESS OF BREATH: 0
PALPITATIONS: 1
ABDOMINAL PAIN: 1
DIZZINESS: 0
HEADACHES: 1
HEMATOCHEZIA: 1
HEMATURIA: 0
ARTHRALGIAS: 1
JOINT SWELLING: 0
MYALGIAS: 0
PARESTHESIAS: 0

## 2019-10-09 ASSESSMENT — MIFFLIN-ST. JEOR: SCORE: 1606.65
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
## 2019-10-09 NOTE — PATIENT INSTRUCTIONS
Attention deficit hyperactivity disorder (ADHD), predominantly inattentive type       Start adderall 10mg twice daily    Irritable bowel syndrome with constipation      Start miralax 1/2 to 1 capful once daily. Dietart fiber or add citrucel daily    Tinea versicolor       Fluconazole    Gastroesophageal reflux disease without esophagitis       If symptoms recur, start ranitidine 150mg twice daily (or 300mg each evening)    Dysfunction of left eustachian tube      Try mucinex (guaifenesin as needed) and see ENT if symptoms persist    Preventive Health Recommendations  Male Ages 26 - 39    Yearly exam:             See your health care provider every year in order to  o   Review health changes.   o   Discuss preventive care.    o   Review your medicines if your doctor has prescribed any.    You should be tested each year for STDs (sexually transmitted diseases), if you re at risk.     After age 35, talk to your provider about cholesterol testing. If you are at risk for heart disease, have your cholesterol tested at least every 5 years.     If you are at risk for diabetes, you should have a diabetes test (fasting glucose).  Shots: Get a flu shot each year. Get a tetanus shot every 10 years.     Nutrition:    Eat at least 5 servings of fruits and vegetables daily.     Eat whole-grain bread, whole-wheat pasta and brown rice instead of white grains and rice.     Get adequate Calcium and Vitamin D.     Lifestyle    Exercise for at least 150 minutes a week (30 minutes a day, 5 days a week). This will help you control your weight and prevent disease.     Limit alcohol to one drink per day.     No smoking.     Wear sunscreen to prevent skin cancer.     See your dentist every six months for an exam and cleaning.

## 2019-11-14 DIAGNOSIS — F90.0 ATTENTION DEFICIT HYPERACTIVITY DISORDER (ADHD), PREDOMINANTLY INATTENTIVE TYPE: ICD-10-CM

## 2019-11-14 RX ORDER — DEXTROAMPHETAMINE SACCHARATE, AMPHETAMINE ASPARTATE, DEXTROAMPHETAMINE SULFATE AND AMPHETAMINE SULFATE 2.5; 2.5; 2.5; 2.5 MG/1; MG/1; MG/1; MG/1
10 TABLET ORAL 2 TIMES DAILY
Qty: 60 TABLET | Refills: 0 | Status: CANCELLED | OUTPATIENT
Start: 2019-11-14

## 2019-11-14 NOTE — TELEPHONE ENCOUNTER
Requested Prescriptions   Pending Prescriptions Disp Refills     amphetamine-dextroamphetamine (ADDERALL) 10 MG tablet        Last Written Prescription Date:  10/9/2019  Last Fill Quantity: 60,   # refills: 0  Last Office Visit: 10/9/2019  Future Office visit:       Routing refill request to provider for review/approval because:  Drug not on the [facility], [facility] or [facility] refill protocol or controlled substance   60 tablet 0     Sig: Take 1 tablet (10 mg) by mouth 2 times daily       There is no refill protocol information for this order

## 2019-11-17 RX ORDER — DEXTROAMPHETAMINE SACCHARATE, AMPHETAMINE ASPARTATE, DEXTROAMPHETAMINE SULFATE AND AMPHETAMINE SULFATE 2.5; 2.5; 2.5; 2.5 MG/1; MG/1; MG/1; MG/1
10 TABLET ORAL 2 TIMES DAILY
Qty: 60 TABLET | Refills: 0 | Status: SHIPPED | OUTPATIENT
Start: 2019-12-18 | End: 2019-12-31

## 2019-11-17 RX ORDER — DEXTROAMPHETAMINE SACCHARATE, AMPHETAMINE ASPARTATE, DEXTROAMPHETAMINE SULFATE AND AMPHETAMINE SULFATE 2.5; 2.5; 2.5; 2.5 MG/1; MG/1; MG/1; MG/1
10 TABLET ORAL 2 TIMES DAILY
Qty: 60 TABLET | Refills: 0 | Status: SHIPPED | OUTPATIENT
Start: 2019-11-17 | End: 2019-12-31

## 2019-11-17 RX ORDER — DEXTROAMPHETAMINE SACCHARATE, AMPHETAMINE ASPARTATE, DEXTROAMPHETAMINE SULFATE AND AMPHETAMINE SULFATE 2.5; 2.5; 2.5; 2.5 MG/1; MG/1; MG/1; MG/1
10 TABLET ORAL 2 TIMES DAILY
Qty: 60 TABLET | Refills: 0 | Status: SHIPPED | OUTPATIENT
Start: 2020-01-18 | End: 2019-12-31

## 2019-12-31 ENCOUNTER — OFFICE VISIT (OUTPATIENT)
Dept: URGENT CARE | Facility: URGENT CARE | Age: 39
End: 2019-12-31
Payer: COMMERCIAL

## 2019-12-31 ENCOUNTER — ANCILLARY PROCEDURE (OUTPATIENT)
Dept: GENERAL RADIOLOGY | Facility: CLINIC | Age: 39
End: 2019-12-31
Attending: PHYSICIAN ASSISTANT
Payer: COMMERCIAL

## 2019-12-31 VITALS
BODY MASS INDEX: 24.25 KG/M2 | RESPIRATION RATE: 20 BRPM | SYSTOLIC BLOOD PRESSURE: 110 MMHG | DIASTOLIC BLOOD PRESSURE: 68 MMHG | OXYGEN SATURATION: 100 % | TEMPERATURE: 99.4 F | HEART RATE: 80 BPM | WEIGHT: 159.5 LBS

## 2019-12-31 DIAGNOSIS — J32.9 SINOBRONCHITIS: ICD-10-CM

## 2019-12-31 DIAGNOSIS — J40 SINOBRONCHITIS: ICD-10-CM

## 2019-12-31 DIAGNOSIS — R50.9 FEVER IN ADULT: Primary | ICD-10-CM

## 2019-12-31 DIAGNOSIS — R50.9 FEVER IN ADULT: ICD-10-CM

## 2019-12-31 LAB
BASOPHILS # BLD AUTO: 0 10E9/L (ref 0–0.2)
BASOPHILS NFR BLD AUTO: 0.2 %
DIFFERENTIAL METHOD BLD: ABNORMAL
EOSINOPHIL # BLD AUTO: 0.1 10E9/L (ref 0–0.7)
EOSINOPHIL NFR BLD AUTO: 1 %
ERYTHROCYTE [DISTWIDTH] IN BLOOD BY AUTOMATED COUNT: 13.8 % (ref 10–15)
FLUAV+FLUBV AG SPEC QL: NEGATIVE
FLUAV+FLUBV AG SPEC QL: NEGATIVE
HCT VFR BLD AUTO: 43.5 % (ref 40–53)
HGB BLD-MCNC: 14.3 G/DL (ref 13.3–17.7)
LYMPHOCYTES # BLD AUTO: 2.9 10E9/L (ref 0.8–5.3)
LYMPHOCYTES NFR BLD AUTO: 22.3 %
MCH RBC QN AUTO: 26.3 PG (ref 26.5–33)
MCHC RBC AUTO-ENTMCNC: 32.9 G/DL (ref 31.5–36.5)
MCV RBC AUTO: 80 FL (ref 78–100)
MONOCYTES # BLD AUTO: 0.7 10E9/L (ref 0–1.3)
MONOCYTES NFR BLD AUTO: 5.2 %
NEUTROPHILS # BLD AUTO: 9.3 10E9/L (ref 1.6–8.3)
NEUTROPHILS NFR BLD AUTO: 71.3 %
PLATELET # BLD AUTO: 329 10E9/L (ref 150–450)
RBC # BLD AUTO: 5.44 10E12/L (ref 4.4–5.9)
SPECIMEN SOURCE: NORMAL
WBC # BLD AUTO: 13.1 10E9/L (ref 4–11)

## 2019-12-31 PROCEDURE — 85025 COMPLETE CBC W/AUTO DIFF WBC: CPT | Performed by: PHYSICIAN ASSISTANT

## 2019-12-31 PROCEDURE — 71046 X-RAY EXAM CHEST 2 VIEWS: CPT

## 2019-12-31 PROCEDURE — 87804 INFLUENZA ASSAY W/OPTIC: CPT | Performed by: PHYSICIAN ASSISTANT

## 2019-12-31 PROCEDURE — 99214 OFFICE O/P EST MOD 30 MIN: CPT | Performed by: PHYSICIAN ASSISTANT

## 2019-12-31 PROCEDURE — 36415 COLL VENOUS BLD VENIPUNCTURE: CPT | Performed by: PHYSICIAN ASSISTANT

## 2019-12-31 RX ORDER — PILOCARPINE HYDROCHLORIDE 10 MG/ML
SOLUTION/ DROPS OPHTHALMIC
COMMUNITY
Start: 2019-12-20

## 2019-12-31 RX ORDER — BRIMONIDINE TARTRATE 2 MG/ML
SOLUTION/ DROPS OPHTHALMIC
COMMUNITY
Start: 2019-12-19

## 2019-12-31 RX ORDER — DOXYCYCLINE HYCLATE 100 MG
100 TABLET ORAL 2 TIMES DAILY
Qty: 20 TABLET | Refills: 0 | Status: SHIPPED | OUTPATIENT
Start: 2019-12-31 | End: 2020-03-02

## 2019-12-31 RX ORDER — DEXTROAMPHETAMINE SACCHARATE, AMPHETAMINE ASPARTATE MONOHYDRATE, DEXTROAMPHETAMINE SULFATE AND AMPHETAMINE SULFATE 2.5; 2.5; 2.5; 2.5 MG/1; MG/1; MG/1; MG/1
CAPSULE, EXTENDED RELEASE ORAL
COMMUNITY
Start: 2019-03-05 | End: 2020-03-02
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
## 2019-12-31 NOTE — LETTER
[facility]  [address]  [address]  [place] [address]  Phone: [phone]  Fax: [phone]    December 31, 2019        Edgardo Mustafa  [address] [place] [address]          To whom it may concern:    RE: Edgardo Mustafa    Patient was seen and treated today at our clinic. Please excuse patient from work on 01/02 - 01/03/2020.     Please contact me for questions or concerns.      Sincerely,        Kaylyn Arnold PA-C

## 2020-02-21 ENCOUNTER — TELEPHONE (OUTPATIENT)
Dept: PEDIATRICS | Facility: CLINIC | Age: 40
End: 2020-02-21

## 2020-02-21 DIAGNOSIS — F90.0 ATTENTION DEFICIT HYPERACTIVITY DISORDER (ADHD), PREDOMINANTLY INATTENTIVE TYPE: Primary | ICD-10-CM

## 2020-02-21 NOTE — TELEPHONE ENCOUNTER
Pt is requesting a refill on his Adderall 10mg tablets. He gets 60 tablets per month.    Thank you,  Margaret Ho [facility]

## 2020-02-24 RX ORDER — DEXTROAMPHETAMINE SACCHARATE, AMPHETAMINE ASPARTATE, DEXTROAMPHETAMINE SULFATE AND AMPHETAMINE SULFATE 2.5; 2.5; 2.5; 2.5 MG/1; MG/1; MG/1; MG/1
10 TABLET ORAL 2 TIMES DAILY
Qty: 60 TABLET | Refills: 0 | Status: SHIPPED | OUTPATIENT
Start: 2020-02-24 | End: 2020-05-26

## 2020-02-24 RX ORDER — DEXTROAMPHETAMINE SACCHARATE, AMPHETAMINE ASPARTATE, DEXTROAMPHETAMINE SULFATE AND AMPHETAMINE SULFATE 2.5; 2.5; 2.5; 2.5 MG/1; MG/1; MG/1; MG/1
10 TABLET ORAL 2 TIMES DAILY
Qty: 60 TABLET | Refills: 0 | Status: SHIPPED | OUTPATIENT
Start: 2020-03-26 | End: 2020-03-02

## 2020-02-24 RX ORDER — DEXTROAMPHETAMINE SACCHARATE, AMPHETAMINE ASPARTATE, DEXTROAMPHETAMINE SULFATE AND AMPHETAMINE SULFATE 2.5; 2.5; 2.5; 2.5 MG/1; MG/1; MG/1; MG/1
10 TABLET ORAL 2 TIMES DAILY
Qty: 60 TABLET | Refills: 0 | Status: SHIPPED | OUTPATIENT
Start: 2020-04-26 | End: 2020-03-02

## 2020-02-24 NOTE — TELEPHONE ENCOUNTER
Controlled Substance Refill Request for   Requested Prescriptions   Pending Prescriptions Disp Refills     amphetamine-dextroamphetamine (ADDERALL XR) 10 MG 24 hr capsule 60 capsule 0     Sig: Take 1 capsule (10 mg) by mouth 2 times daily       There is no refill protocol information for this order        Problem List Complete:  Yes  Last Written Prescription Date:  10/19 - 3mo supply   Last Fill Quantity: 60,   # refills: 0  Last Office Visit with [facility] primary care provider: 10/2019    Future Office visit: None - Due for April 2020    Controlled substance agreement:   Encounter-Level CSA:    There are no encounter-level csa.     Patient-Level CSA:    There are no patient-level csa.       Last Urine Drug Screen: No results found for: CDAUT, No results found for: COMDAT, No results found for: THC13, PCP13, COC13, MAMP13, OPI13, AMP13, BZO13, TCA13, MTD13, BAR13, OXY13, PPX13, BUP13     Processing:  Rx to be electronically transmitted to pharmacy by provider     [email or web address]   checked in past 3 months?  No, route to RN    [place] reviewed:  reviewed- no concerns    [place]  [email or web address]

## 2020-03-02 ENCOUNTER — OFFICE VISIT (OUTPATIENT)
Dept: URGENT CARE | Facility: URGENT CARE | Age: 40
End: 2020-03-02
Payer: COMMERCIAL

## 2020-03-02 ENCOUNTER — TELEPHONE (OUTPATIENT)
Dept: PEDIATRICS | Facility: CLINIC | Age: 40
End: 2020-03-02

## 2020-03-02 VITALS
BODY MASS INDEX: 24.31 KG/M2 | OXYGEN SATURATION: 100 % | WEIGHT: 159.9 LBS | HEART RATE: 75 BPM | DIASTOLIC BLOOD PRESSURE: 79 MMHG | TEMPERATURE: 98.1 F | SYSTOLIC BLOOD PRESSURE: 115 MMHG

## 2020-03-02 DIAGNOSIS — J06.9 VIRAL URI: Primary | ICD-10-CM

## 2020-03-02 LAB
ALBUMIN UR-MCNC: NEGATIVE MG/DL
APPEARANCE UR: CLEAR
BILIRUB UR QL STRIP: NEGATIVE
COLOR UR AUTO: YELLOW
FLUAV+FLUBV AG SPEC QL: NEGATIVE
FLUAV+FLUBV AG SPEC QL: NEGATIVE
GLUCOSE UR STRIP-MCNC: NEGATIVE MG/DL
HGB UR QL STRIP: NEGATIVE
KETONES UR STRIP-MCNC: NEGATIVE MG/DL
LEUKOCYTE ESTERASE UR QL STRIP: NEGATIVE
NITRATE UR QL: NEGATIVE
PH UR STRIP: 6.5 PH (ref 5–7)
SOURCE: NORMAL
SP GR UR STRIP: 1.01 (ref 1–1.03)
SPECIMEN SOURCE: NORMAL
UROBILINOGEN UR STRIP-ACNC: 0.2 EU/DL (ref 0.2–1)

## 2020-03-02 PROCEDURE — 99213 OFFICE O/P EST LOW 20 MIN: CPT | Performed by: PHYSICIAN ASSISTANT

## 2020-03-02 PROCEDURE — 40001204 ZZHCL STATISTIC STREP A RAPID: Performed by: PHYSICIAN ASSISTANT

## 2020-03-02 PROCEDURE — 81003 URINALYSIS AUTO W/O SCOPE: CPT | Performed by: PHYSICIAN ASSISTANT

## 2020-03-02 PROCEDURE — 87804 INFLUENZA ASSAY W/OPTIC: CPT | Mod: 59 | Performed by: PHYSICIAN ASSISTANT

## 2020-03-02 PROCEDURE — 87651 STREP A DNA AMP PROBE: CPT | Performed by: PHYSICIAN ASSISTANT

## 2020-03-02 NOTE — TELEPHONE ENCOUNTER
Spoke to patient.   He has been sick for 10 days with the flu. Back to work today. Started having back pain on the left lower just below ribs. He has had kidney infections previously.     He has been pushing fluids. Urinating ok. But having this pain. Has had kidney infection before. Felt like this.     Advised . He will call as needed.Kimberly Marquez RN on 3/2/2020 at 4:27 PM

## 2020-03-03 LAB
DEPRECATED S PYO AG THROAT QL EIA: NEGATIVE
SPECIMEN SOURCE: NORMAL
SPECIMEN SOURCE: NORMAL
STREP GROUP A PCR: NOT DETECTED
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
## 2020-03-09 DIAGNOSIS — F51.04 CHRONIC INSOMNIA: ICD-10-CM

## 2020-03-12 RX ORDER — ZOLPIDEM TARTRATE 10 MG/1
TABLET ORAL
Qty: 90 TABLET | Refills: 1 | Status: SHIPPED | OUTPATIENT
Start: 2020-03-12 | End: 2020-08-25

## 2020-03-12 NOTE — TELEPHONE ENCOUNTER
Refill request for: AMBIEN 10 MG at bedtime PRN    Last rx written: 8/22/19 # 90 w/ 1 refill    **[place]  reviewed- last filled: 2/14  Last OV: 10/9/19  for routine exam    Unable to fill per standing order routed to Dr. Rosales for approval.    Monica Ahumada RN

## 2020-05-23 DIAGNOSIS — F90.0 ATTENTION DEFICIT HYPERACTIVITY DISORDER (ADHD), PREDOMINANTLY INATTENTIVE TYPE: ICD-10-CM

## 2020-05-26 ENCOUNTER — MYC MEDICAL ADVICE (OUTPATIENT)
Dept: PEDIATRICS | Facility: CLINIC | Age: 40
End: 2020-05-26

## 2020-05-26 RX ORDER — DEXTROAMPHETAMINE SACCHARATE, AMPHETAMINE ASPARTATE, DEXTROAMPHETAMINE SULFATE AND AMPHETAMINE SULFATE 2.5; 2.5; 2.5; 2.5 MG/1; MG/1; MG/1; MG/1
10 TABLET ORAL 2 TIMES DAILY
Qty: 60 TABLET | Refills: 0 | Status: SHIPPED | OUTPATIENT
Start: 2020-05-26 | End: 2020-06-23

## 2020-06-23 DIAGNOSIS — F90.0 ATTENTION DEFICIT HYPERACTIVITY DISORDER (ADHD), PREDOMINANTLY INATTENTIVE TYPE: ICD-10-CM

## 2020-06-23 RX ORDER — DEXTROAMPHETAMINE SACCHARATE, AMPHETAMINE ASPARTATE, DEXTROAMPHETAMINE SULFATE AND AMPHETAMINE SULFATE 2.5; 2.5; 2.5; 2.5 MG/1; MG/1; MG/1; MG/1
TABLET ORAL
Qty: 60 TABLET | Refills: 0 | Status: SHIPPED | OUTPATIENT
Start: 2020-06-26 | End: 2020-07-24

## 2020-06-23 NOTE — TELEPHONE ENCOUNTER
Routing refill request to provider for review/approval because:  Drug not on the [facility] refill protocol     Nicol Brunson RN   [facility] -- Triage Nurse

## 2020-07-24 DIAGNOSIS — F90.0 ATTENTION DEFICIT HYPERACTIVITY DISORDER (ADHD), PREDOMINANTLY INATTENTIVE TYPE: ICD-10-CM

## 2020-07-25 RX ORDER — DEXTROAMPHETAMINE SACCHARATE, AMPHETAMINE ASPARTATE, DEXTROAMPHETAMINE SULFATE AND AMPHETAMINE SULFATE 2.5; 2.5; 2.5; 2.5 MG/1; MG/1; MG/1; MG/1
10 TABLET ORAL 2 TIMES DAILY
Qty: 60 TABLET | Refills: 0 | Status: SHIPPED | OUTPATIENT
Start: 2020-07-25 | End: 2020-08-21

## 2020-08-21 DIAGNOSIS — F90.0 ATTENTION DEFICIT HYPERACTIVITY DISORDER (ADHD), PREDOMINANTLY INATTENTIVE TYPE: ICD-10-CM

## 2020-08-21 RX ORDER — DEXTROAMPHETAMINE SACCHARATE, AMPHETAMINE ASPARTATE, DEXTROAMPHETAMINE SULFATE AND AMPHETAMINE SULFATE 2.5; 2.5; 2.5; 2.5 MG/1; MG/1; MG/1; MG/1
10 TABLET ORAL 2 TIMES DAILY
Qty: 60 TABLET | Refills: 0 | Status: SHIPPED | OUTPATIENT
Start: 2020-08-21 | End: 2020-12-22

## 2020-08-21 NOTE — TELEPHONE ENCOUNTER
Medication Question or Refill    Who is calling: patient    What medication are you calling about (include dose and sig)?: amphetamine-dextroamphetamine (ADDERALL) 10 MG tablet    Controlled Substance Agreement on file: No    Who prescribed the medication?: Dr Rosales    Do you need a refill? Yes: as soon as possible    When did you use the medication last? ongoing    Patient offered an appointment? Yes: med check virtually scheduled for 8/25/20    Do you have any questions or concerns?  No    Requested Pharmacy: [facility] to leave a detailed message?: Yes at Home number on file [phone] (home)

## 2020-08-25 ENCOUNTER — VIRTUAL VISIT (OUTPATIENT)
Dept: PEDIATRICS | Facility: CLINIC | Age: 40
End: 2020-08-25
Payer: COMMERCIAL

## 2020-08-25 DIAGNOSIS — B36.0 TINEA VERSICOLOR: ICD-10-CM

## 2020-08-25 DIAGNOSIS — F51.04 CHRONIC INSOMNIA: ICD-10-CM

## 2020-08-25 DIAGNOSIS — F90.0 ATTENTION DEFICIT HYPERACTIVITY DISORDER (ADHD), PREDOMINANTLY INATTENTIVE TYPE: Primary | ICD-10-CM

## 2020-08-25 PROCEDURE — 99214 OFFICE O/P EST MOD 30 MIN: CPT | Mod: TEL | Performed by: INTERNAL MEDICINE

## 2020-08-25 RX ORDER — DEXTROAMPHETAMINE SACCHARATE, AMPHETAMINE ASPARTATE, DEXTROAMPHETAMINE SULFATE AND AMPHETAMINE SULFATE 2.5; 2.5; 2.5; 2.5 MG/1; MG/1; MG/1; MG/1
10 TABLET ORAL 2 TIMES DAILY
Qty: 60 TABLET | Refills: 0 | Status: SHIPPED | OUTPATIENT
Start: 2020-10-21 | End: 2020-11-20

## 2020-08-25 RX ORDER — KETOCONAZOLE 20 MG/ML
SHAMPOO TOPICAL DAILY
Qty: 100 ML | Refills: 3 | Status: SHIPPED | OUTPATIENT
Start: 2020-08-25

## 2020-08-25 RX ORDER — DEXTROAMPHETAMINE SACCHARATE, AMPHETAMINE ASPARTATE, DEXTROAMPHETAMINE SULFATE AND AMPHETAMINE SULFATE 2.5; 2.5; 2.5; 2.5 MG/1; MG/1; MG/1; MG/1
10 TABLET ORAL 2 TIMES DAILY
Qty: 60 TABLET | Refills: 0 | Status: SHIPPED | OUTPATIENT
Start: 2020-11-21 | End: 2020-12-22

## 2020-08-25 RX ORDER — DEXTROAMPHETAMINE SACCHARATE, AMPHETAMINE ASPARTATE, DEXTROAMPHETAMINE SULFATE AND AMPHETAMINE SULFATE 2.5; 2.5; 2.5; 2.5 MG/1; MG/1; MG/1; MG/1
10 TABLET ORAL 2 TIMES DAILY
Qty: 60 TABLET | Refills: 0 | Status: SHIPPED | OUTPATIENT
Start: 2020-09-21 | End: 2020-10-21

## 2020-08-25 RX ORDER — ZOLPIDEM TARTRATE 10 MG/1
TABLET ORAL
Qty: 90 TABLET | Refills: 1 | Status: SHIPPED | OUTPATIENT
Start: 2020-08-25 | End: 2021-02-16

## 2020-08-25 RX ORDER — DEXTROAMPHETAMINE SACCHARATE, AMPHETAMINE ASPARTATE, DEXTROAMPHETAMINE SULFATE AND AMPHETAMINE SULFATE 2.5; 2.5; 2.5; 2.5 MG/1; MG/1; MG/1; MG/1
10 TABLET ORAL 2 TIMES DAILY
Qty: 60 TABLET | Refills: 0 | Status: CANCELLED | OUTPATIENT
Start: 2020-08-25
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
## 2020-09-22 DIAGNOSIS — F90.0 ATTENTION DEFICIT HYPERACTIVITY DISORDER (ADHD), PREDOMINANTLY INATTENTIVE TYPE: ICD-10-CM

## 2020-09-23 NOTE — TELEPHONE ENCOUNTER
Please call pharmacy  This was recently refilled for 3 months (see med list)  Should not need a refill

## 2020-09-25 DIAGNOSIS — N52.9 ERECTILE DYSFUNCTION, UNSPECIFIED ERECTILE DYSFUNCTION TYPE: Primary | ICD-10-CM

## 2020-09-25 RX ORDER — DEXTROAMPHETAMINE SACCHARATE, AMPHETAMINE ASPARTATE, DEXTROAMPHETAMINE SULFATE AND AMPHETAMINE SULFATE 2.5; 2.5; 2.5; 2.5 MG/1; MG/1; MG/1; MG/1
10 TABLET ORAL 2 TIMES DAILY
Qty: 60 TABLET | Refills: 0 | OUTPATIENT
Start: 2020-09-25

## 2020-09-25 NOTE — TELEPHONE ENCOUNTER
Routing refill request to provider for review/approval because:  Drug not active on patient's medication list  Isrrael Milan RN, BSN

## 2020-09-27 RX ORDER — SILDENAFIL CITRATE 20 MG/1
TABLET ORAL
Qty: 40 TABLET | Refills: 6 | Status: SHIPPED | OUTPATIENT
Start: 2020-09-27 | End: 2021-08-04

## 2020-11-23 ENCOUNTER — VIRTUAL VISIT (OUTPATIENT)
Dept: FAMILY MEDICINE | Facility: OTHER | Age: 40
End: 2020-11-23
Payer: COMMERCIAL

## 2020-11-23 PROCEDURE — 99421 OL DIG E/M SVC 5-10 MIN: CPT | Performed by: PHYSICIAN ASSISTANT

## 2020-11-24 NOTE — PROGRESS NOTES
"Date: 2020 17:59:30  Clinician: Kaylyn Arnold  Clinician NPI: [id number]  Patient: Edgardo Mustafa  Patient : 1980  Patient Address: [address] [place] [id number]  Patient Phone: [phone]  Visit Protocol: URI  Patient Summary:  Edgardo is a 40 year old ( : 1980 ) male who initiated a [facility] Visit for COVID-19 (Coronavirus) evaluation and screening. When asked the question \"Please sign me up to receive news, health information and promotions from [facility].\", Edgardo responded \"No\".    When asked when his symptoms started, Edgardo reported that he does not have any symptoms.   He denies taking antibiotic medication in the past month and having recent facial or sinus surgery in the past 60 days.    Pertinent COVID-19 (Coronavirus) information  Edgardo does not work or volunteer as healthcare worker or a . In the past 14 days, Edgardo has not worked or volunteered at a healthcare facility or group living setting.   In the past 14 days, he also has not lived in a congregate living setting.   Edgardo has had a close contact with a laboratory-confirmed COVID-19 patient in the last 14 days. He was exposed at his work. Date Edgardo was exposed to the laboratory-confirmed COVID-19 patient: 2020   Additional information about contact with COVID-19 (Coronavirus) patient as reported by the patient (free text): I do not have any symptoms, but spent the day with someone who just tested positive for COVID.   Edgardo is not living in the same household with the COVID-19 positive patient. He was in an enclosed space for greater than 15 minutes with the COVID-19 patient.   During the encounter, neither were wearing masks.   Since 2019, Edgardo has not been tested for COVID-19 and has not had upper respiratory infection or influenza-like illness.   Pertinent medical history  Edgardo needs a return to work/school note.   " Weight: 150 lbs   Edgardo does not smoke or use smokeless tobacco.   Weight: 150 lbs    MEDICATIONS: brimonidine-timolol ophthalmic (eye), pilocarpine ophthalmic (eye), zolpidem oral, Adderall oral, ALLERGIES: clindamycin  Clinician Response:  Dear Edgardo,   Your symptoms show that you may have coronavirus (COVID-19). This illness can cause fever, cough and trouble breathing. Many people get a mild case and get better on their own. Some people can get very sick.  What should I do?  We would like to test you for this virus.   1. Please call [phone] to schedule your visit. Explain that you were referred by [facility] to have a COVID-19 test. Be ready to share your [facility] visit ID number.  * If you need to schedule in [facility] please call [phone] or for [place] employees please call [phone].  * If you need to schedule in the [place] area please call [phone]. [place] employees call [phone].  The following will serve as your written order for this COVID Test, ordered by me, for the indication of suspected COVID [Z20.828]: The test will be ordered in [employer], our electronic health record, after you are scheduled. It will show as ordered and authorized by Pk Vazquez MD.  Order: COVID-19 (Coronavirus) PCR for SYMPTOMATIC testing from [facility].   2. When it's time for your COVID test:  Stay at least 6 feet away from others. (If someone will drive you to your test, stay in the backseat, as far away from the  as you can.)   Cover your mouth and nose with a mask, tissue or washcloth.  Go straight to the testing site. Don't make any stops on the way there or back.      3.Starting now: Stay home and away from others (self-isolate) until:   You've had no fever---and no medicine that reduces fever---for one full day (24 hours). And...   Your other symptoms have gotten better. For example, your cough or breathing has improved. And...   At least 10 days have passed since your symptoms started.      "  During this time, don't leave the house except for testing or medical care.   Stay in your own room, even for meals. Use your own bathroom if you can.   Stay away from others in your home. No hugging, kissing or shaking hands. No visitors.  Don't go to work, school or anywhere else.    Clean \"high touch\" surfaces often (doorknobs, counters, handles, etc.). Use a household cleaning spray or wipes. You'll find a full list of  on the EPA website: www.epa.gov/pesticide-registration/list-n-disinfectants-use-against-sars-cov-2.   Cover your mouth and nose with a mask, tissue or washcloth to avoid spreading germs.  Wash your hands and face often. Use soap and water.  Caregivers in these groups are at risk for severe illness due to COVID-19:  o People 65 years and older  o People who live in a nursing home or long-term care facility  o People with chronic disease (lung, heart, cancer, diabetes, kidney, liver, immunologic)  o People who have a weakened immune system, including those who:   Are in cancer treatment  Take medicine that weakens the immune system, such as corticosteroids  Had a bone marrow or organ transplant  Have an immune deficiency  Have poorly controlled HIV or AIDS  Are obese (body mass index of 40 or higher)  Smoke regularly   o Caregivers should wear gloves while washing dishes, handling laundry and cleaning bedrooms and bathrooms.  o Use caution when washing and drying laundry: Don't shake dirty laundry, and use the warmest water setting that you can.  o For more tips, go to www.cdc.gov/coronavirus/2019-ncov/downloads/10Things.pdf.    4.Sign up for [employer]. We know it's scary to hear that you might have COVID-19. We want to track your symptoms to make sure you're okay over the next 2 weeks. Please look for an email from [employer]---this is a free, online program that we'll use to keep in touch. To sign up, follow the link in the email. Learn more at [email or web address]  " How can I take care of myself?   Get lots of rest. Drink extra fluids (unless a doctor has told you not to).   Take Tylenol (acetaminophen) for fever or pain. If you have liver or kidney problems, ask your family doctor if it's okay to take Tylenol.   Adults can take either:    650 mg (two 325 mg pills) every 4 to 6 hours, or...   1,000 mg (two 500 mg pills) every 8 hours as needed.    Note: Don't take more than 3,000 mg in one day. Acetaminophen is found in many medicines (both prescribed and over-the-counter medicines). Read all labels to be sure you don't take too much.   For children, check the Tylenol bottle for the right dose. The dose is based on the child's age or weight.    If you have other health problems (like cancer, heart failure, an organ transplant or severe kidney disease): Call your specialty clinic if you don't feel better in the next 2 days.       Know when to call 911. Emergency warning signs include:    Trouble breathing or shortness of breath Pain or pressure in the chest that doesn't go away Feeling confused like you haven't felt before, or not being able to wake up Bluish-colored lips or face.  Where can I get more information?   [facility] -- About COVID-19: [email or web address]   CDC -- What to Do If You're Sick: www.cdc.gov/coronavirus/2019-ncov/about/steps-when-sick.html   CDC -- Ending Home Isolation: www.cdc.gov/coronavirus/2019-ncov/hcp/disposition-in-home-patients.html   CDC -- Caring for Someone: www.cdc.gov/coronavirus/2019-ncov/if-you-are-sick/care-for-someone.html   [facility] -- Interim Guidance for Hospital Discharge to Home: www.health.[facility].mn.us/diseases/coronavirus/hcp/hospdischarge.pdf   [facility] clinical trials (COVID-19 research studies): clinicalaffairs.[facility]/umn-clinical-trials    Below are the COVID-19 hotlines at the [place] Department of Health ([facility]). Interpreters are available.    For health questions: Call 153-569-5231 or  1-959.750.7215 (7 a.m. to 7 p.m.) For questions about schools and childcare: Call 524-878-8593 or 1-719.284.8667 (7 a.m. to 7 p.m.)    Diagnosis: Contact with and (suspected) exposure to other viral communicable diseases  Diagnosis ICD: Z20.828

## 2020-12-20 ENCOUNTER — HEALTH MAINTENANCE LETTER (OUTPATIENT)
Age: 40
End: 2020-12-20

## 2020-12-22 DIAGNOSIS — F90.0 ATTENTION DEFICIT HYPERACTIVITY DISORDER (ADHD), PREDOMINANTLY INATTENTIVE TYPE: ICD-10-CM

## 2020-12-22 RX ORDER — DEXTROAMPHETAMINE SACCHARATE, AMPHETAMINE ASPARTATE, DEXTROAMPHETAMINE SULFATE AND AMPHETAMINE SULFATE 2.5; 2.5; 2.5; 2.5 MG/1; MG/1; MG/1; MG/1
10 TABLET ORAL 2 TIMES DAILY
Qty: 60 TABLET | Refills: 0 | Status: SHIPPED | OUTPATIENT
Start: 2020-12-22 | End: 2021-01-20

## 2020-12-22 RX ORDER — DEXTROAMPHETAMINE SACCHARATE, AMPHETAMINE ASPARTATE, DEXTROAMPHETAMINE SULFATE AND AMPHETAMINE SULFATE 2.5; 2.5; 2.5; 2.5 MG/1; MG/1; MG/1; MG/1
10 TABLET ORAL 2 TIMES DAILY
Qty: 60 TABLET | Refills: 0 | Status: SHIPPED | OUTPATIENT
Start: 2020-12-22 | End: 2020-12-22

## 2020-12-22 NOTE — TELEPHONE ENCOUNTER
Pending Prescriptions:                       Disp   Refills    amphetamine-dextroamphetamine (ADDERALL) *60 tab*0            Sig: Take 1 tablet (10 mg) by mouth 2 times daily Due           for follow up visit    Patient has one day left.     Patient is out of town, pended closet pharmacy.     Ayse Mcfarlane, EMT at 12:37 PM on December 22, 2020  [facility] Health Guide   [phone]

## 2020-12-22 NOTE — TELEPHONE ENCOUNTER
Dr. López-    The pharmacy that was listed on other refill request was inside of a cancer care center. They do not fill adderall. Can you please sign off on this med again? Thank you! Kimberly Marquez RN on 12/22/2020 at 1:55 PM

## 2021-01-20 DIAGNOSIS — F90.0 ATTENTION DEFICIT HYPERACTIVITY DISORDER (ADHD), PREDOMINANTLY INATTENTIVE TYPE: ICD-10-CM

## 2021-01-20 RX ORDER — DEXTROAMPHETAMINE SACCHARATE, AMPHETAMINE ASPARTATE, DEXTROAMPHETAMINE SULFATE AND AMPHETAMINE SULFATE 2.5; 2.5; 2.5; 2.5 MG/1; MG/1; MG/1; MG/1
10 TABLET ORAL 2 TIMES DAILY
Qty: 60 TABLET | Refills: 0 | Status: SHIPPED | OUTPATIENT
Start: 2021-01-20 | End: 2021-02-16

## 2021-01-20 NOTE — TELEPHONE ENCOUNTER
Routing refill request to provider for review/approval because:  Drug not on the [facility] refill protocol     Nicol Brunson RN   [facility] -- Triage Nurse

## 2021-02-15 DIAGNOSIS — F51.04 CHRONIC INSOMNIA: ICD-10-CM

## 2021-02-15 DIAGNOSIS — F90.0 ATTENTION DEFICIT HYPERACTIVITY DISORDER (ADHD), PREDOMINANTLY INATTENTIVE TYPE: ICD-10-CM

## 2021-02-15 NOTE — TELEPHONE ENCOUNTER
Routing refill request to provider for review/approval because:  Drug not on the [facility] refill protocol     Isabel Russo RN on 2/15/2021 at 1:34 PM

## 2021-02-16 RX ORDER — ZOLPIDEM TARTRATE 10 MG/1
TABLET ORAL
Qty: 90 TABLET | Refills: 0 | Status: SHIPPED | OUTPATIENT
Start: 2021-02-16 | End: 2021-05-13

## 2021-02-16 RX ORDER — DEXTROAMPHETAMINE SACCHARATE, AMPHETAMINE ASPARTATE, DEXTROAMPHETAMINE SULFATE AND AMPHETAMINE SULFATE 2.5; 2.5; 2.5; 2.5 MG/1; MG/1; MG/1; MG/1
10 TABLET ORAL 2 TIMES DAILY
Qty: 60 TABLET | Refills: 0 | Status: SHIPPED | OUTPATIENT
Start: 2021-02-16 | End: 2021-03-17

## 2021-03-17 DIAGNOSIS — F90.0 ATTENTION DEFICIT HYPERACTIVITY DISORDER (ADHD), PREDOMINANTLY INATTENTIVE TYPE: ICD-10-CM

## 2021-03-17 RX ORDER — DEXTROAMPHETAMINE SACCHARATE, AMPHETAMINE ASPARTATE, DEXTROAMPHETAMINE SULFATE AND AMPHETAMINE SULFATE 2.5; 2.5; 2.5; 2.5 MG/1; MG/1; MG/1; MG/1
10 TABLET ORAL 2 TIMES DAILY
Qty: 60 TABLET | Refills: 0 | Status: SHIPPED | OUTPATIENT
Start: 2021-03-17 | End: 2021-04-12

## 2021-03-17 NOTE — TELEPHONE ENCOUNTER
Routing refill request to provider for review/approval because:  Drug not on the [facility] refill protocol     Nicol Brunson RN   [facility] -- Triage Nurse

## 2021-04-12 DIAGNOSIS — F90.0 ATTENTION DEFICIT HYPERACTIVITY DISORDER (ADHD), PREDOMINANTLY INATTENTIVE TYPE: ICD-10-CM

## 2021-04-12 RX ORDER — DEXTROAMPHETAMINE SACCHARATE, AMPHETAMINE ASPARTATE, DEXTROAMPHETAMINE SULFATE AND AMPHETAMINE SULFATE 2.5; 2.5; 2.5; 2.5 MG/1; MG/1; MG/1; MG/1
10 TABLET ORAL 2 TIMES DAILY
Qty: 60 TABLET | Refills: 0 | Status: SHIPPED | OUTPATIENT
Start: 2021-04-17 | End: 2021-05-13

## 2021-05-13 DIAGNOSIS — F90.0 ATTENTION DEFICIT HYPERACTIVITY DISORDER (ADHD), PREDOMINANTLY INATTENTIVE TYPE: ICD-10-CM

## 2021-05-13 DIAGNOSIS — F51.04 CHRONIC INSOMNIA: ICD-10-CM

## 2021-05-13 RX ORDER — ZOLPIDEM TARTRATE 10 MG/1
TABLET ORAL
Qty: 90 TABLET | Refills: 0 | Status: SHIPPED | OUTPATIENT
Start: 2021-05-13 | End: 2021-08-10

## 2021-05-13 RX ORDER — DEXTROAMPHETAMINE SACCHARATE, AMPHETAMINE ASPARTATE, DEXTROAMPHETAMINE SULFATE AND AMPHETAMINE SULFATE 2.5; 2.5; 2.5; 2.5 MG/1; MG/1; MG/1; MG/1
10 TABLET ORAL 2 TIMES DAILY
Qty: 60 TABLET | Refills: 0 | Status: SHIPPED | OUTPATIENT
Start: 2021-05-17 | End: 2021-06-15

## 2021-05-13 NOTE — TELEPHONE ENCOUNTER
Routing refill request to provider for review/approval because:  Drug not on the [facility] refill protocol     Cody HERNANDEZ RN

## 2021-06-24 ENCOUNTER — TELEPHONE (OUTPATIENT)
Dept: PEDIATRICS | Facility: CLINIC | Age: 41
End: 2021-06-24

## 2021-06-24 NOTE — TELEPHONE ENCOUNTER
Reason for Call:  Other call back    Detailed comments: Pt is hoping to be notified of an earlier appointment before his next prescription runs out.  Made appt for 7/27 but said he would be out of the med by that appt time.    Phone Number Patient can be reached at: Cell number on file:    Telephone Information:   Mobile [phone]       Best Time: anytime    Can we leave a detailed message on this number? YES    Call taken on 6/24/2021 at 2:32 PM by Noa Rankin

## 2021-07-16 DIAGNOSIS — F90.0 ATTENTION DEFICIT HYPERACTIVITY DISORDER (ADHD), PREDOMINANTLY INATTENTIVE TYPE: ICD-10-CM

## 2021-07-16 RX ORDER — DEXTROAMPHETAMINE SACCHARATE, AMPHETAMINE ASPARTATE, DEXTROAMPHETAMINE SULFATE AND AMPHETAMINE SULFATE 2.5; 2.5; 2.5; 2.5 MG/1; MG/1; MG/1; MG/1
10 TABLET ORAL 2 TIMES DAILY
Qty: 60 TABLET | Refills: 0 | Status: SHIPPED | OUTPATIENT
Start: 2021-07-16 | End: 2022-02-17

## 2021-07-27 ENCOUNTER — OFFICE VISIT (OUTPATIENT)
Dept: PEDIATRICS | Facility: CLINIC | Age: 41
End: 2021-07-27
Payer: COMMERCIAL

## 2021-07-27 VITALS
TEMPERATURE: 97.8 F | BODY MASS INDEX: 23.04 KG/M2 | HEART RATE: 74 BPM | HEIGHT: 68 IN | DIASTOLIC BLOOD PRESSURE: 60 MMHG | OXYGEN SATURATION: 100 % | RESPIRATION RATE: 16 BRPM | WEIGHT: 152 LBS | SYSTOLIC BLOOD PRESSURE: 102 MMHG

## 2021-07-27 DIAGNOSIS — F51.04 CHRONIC INSOMNIA: ICD-10-CM

## 2021-07-27 DIAGNOSIS — F90.0 ATTENTION DEFICIT HYPERACTIVITY DISORDER (ADHD), PREDOMINANTLY INATTENTIVE TYPE: Primary | ICD-10-CM

## 2021-07-27 DIAGNOSIS — H69.90 DYSFUNCTION OF EUSTACHIAN TUBE, UNSPECIFIED LATERALITY: ICD-10-CM

## 2021-07-27 DIAGNOSIS — I83.93 ASYMPTOMATIC VARICOSE VEINS OF BOTH LOWER EXTREMITIES: ICD-10-CM

## 2021-07-27 PROBLEM — S68.629S: Status: RESOLVED | Noted: 2017-04-03 | Resolved: 2021-07-27

## 2021-07-27 PROCEDURE — 99214 OFFICE O/P EST MOD 30 MIN: CPT | Performed by: INTERNAL MEDICINE

## 2021-07-27 RX ORDER — DEXTROAMPHETAMINE SACCHARATE, AMPHETAMINE ASPARTATE, DEXTROAMPHETAMINE SULFATE AND AMPHETAMINE SULFATE 2.5; 2.5; 2.5; 2.5 MG/1; MG/1; MG/1; MG/1
10 TABLET ORAL 2 TIMES DAILY
Qty: 60 TABLET | Refills: 0 | Status: CANCELLED | OUTPATIENT
Start: 2021-07-27

## 2021-07-27 ASSESSMENT — MIFFLIN-ST. JEOR: SCORE: 1578.97

## 2021-07-27 NOTE — PROGRESS NOTES
Assessment & Plan     (F90.0) Attention deficit hyperactivity disorder (ADHD), predominantly inattentive type  (primary encounter diagnosis)  Comment:   Plan: Symptoms well controlled, continue current Rx    (F51.04) Chronic insomnia  Comment:   Plan: Continue Ambien as needed.  Discussed taper off Ambien and consult through the sleep center/patient declines feels that the current regimen is working well without side effects    (H69.80) Dysfunction of Eustachian tube, unspecified laterality  Comment: Suspect eustachian tube dysfunction, patient requests a second opinion /ENT referral.  Plan: Otolaryngology Referral          (I83.93) Asymptomatic varicose veins of both lower extremities  Comment:   Plan: Diagnosis discussed    Return in about 6 months (around 1/27/2022) for ADD follow-up.    Mesfin Rosales MD  [facility] WILLIAMS Reynoso is a 40 year old who presents for the following health issues     HPI     Medication Followup of adderall    Taking Medication as prescribed: yes    Side Effects:  None    Medication Helping Symptoms:  yes     Chronic insomnia.  Symptoms date back to  service.  Continues Ambien each evening has been unable to taper off.  Denies residual daytime somnolence or parasomnias.  Denies side effects.    Recurrent problems with plugged sensation in both ears, mainly notes this when exercising.  Previously requested referral to ENT but did not follow through/requests ENT referral today.    Questions about enlarged veins of his lower legs and whether this puts him at risk for blood clots.    Patient Active Problem List   Diagnosis     Generalized hyperhidrosis     Adie's pupil, left     Neuropathic pain of hand, left     MARU (generalized anxiety disorder)     Chronic insomnia     Mechanical low back pain     Attention deficit hyperactivity disorder (ADHD), predominantly inattentive type     Gastroesophageal reflux disease without esophagitis      "Asymptomatic varicose veins of both lower extremities         Objective    /60 (Cuff Size: Adult Regular)   Pulse 74   Temp 97.8  F (36.6  C) (Tympanic)   Resp 16   Ht 1.727 m (5' 8\")   Wt 68.9 kg (152 lb)   SpO2 100%   BMI 23.11 kg/m    Body mass index is 23.11 kg/m .  Physical Exam   GENERAL: healthy, alert and no distress  SKIN: no suspicious lesions or rashes  Ext: Bilateral varicose veins lower extremities below knee  NEURO: Normal strength and tone, mentation intact and speech normal  PSYCH: mentation appears normal, affect normal/bright    "

## 2021-08-10 DIAGNOSIS — F51.04 CHRONIC INSOMNIA: ICD-10-CM

## 2021-08-10 NOTE — TELEPHONE ENCOUNTER
Pt states they are out of state and will run out of Rx while away. Please contact if there are further questions/concerns or if this Rx is unable to be filled out of state.    Rosy Schwartz on 8/10/2021 at 3:32 PM

## 2021-08-11 RX ORDER — ZOLPIDEM TARTRATE 10 MG/1
10 TABLET ORAL
Qty: 90 TABLET | Refills: 1 | Status: SHIPPED | OUTPATIENT
Start: 2021-08-11 | End: 2021-11-09

## 2021-08-11 NOTE — TELEPHONE ENCOUNTER
Please see message.     Routing refill request to provider for review/approval because:  Drug not on the [facility] refill protocol     Cody HERNANDEZ RN

## 2021-08-17 DIAGNOSIS — F90.0 ATTENTION DEFICIT HYPERACTIVITY DISORDER (ADHD), PREDOMINANTLY INATTENTIVE TYPE: ICD-10-CM

## 2021-08-18 RX ORDER — DEXTROAMPHETAMINE SACCHARATE, AMPHETAMINE ASPARTATE, DEXTROAMPHETAMINE SULFATE AND AMPHETAMINE SULFATE 2.5; 2.5; 2.5; 2.5 MG/1; MG/1; MG/1; MG/1
10 TABLET ORAL 2 TIMES DAILY
Qty: 60 TABLET | Refills: 0 | Status: SHIPPED | OUTPATIENT
Start: 2021-09-18 | End: 2021-10-18

## 2021-08-18 RX ORDER — DEXTROAMPHETAMINE SACCHARATE, AMPHETAMINE ASPARTATE, DEXTROAMPHETAMINE SULFATE AND AMPHETAMINE SULFATE 2.5; 2.5; 2.5; 2.5 MG/1; MG/1; MG/1; MG/1
TABLET ORAL
Qty: 60 TABLET | Refills: 0 | OUTPATIENT
Start: 2021-08-18

## 2021-08-18 RX ORDER — DEXTROAMPHETAMINE SACCHARATE, AMPHETAMINE ASPARTATE, DEXTROAMPHETAMINE SULFATE AND AMPHETAMINE SULFATE 2.5; 2.5; 2.5; 2.5 MG/1; MG/1; MG/1; MG/1
10 TABLET ORAL 2 TIMES DAILY
Qty: 60 TABLET | Refills: 0 | Status: SHIPPED | OUTPATIENT
Start: 2021-10-19 | End: 2021-11-18

## 2021-08-18 RX ORDER — DEXTROAMPHETAMINE SACCHARATE, AMPHETAMINE ASPARTATE, DEXTROAMPHETAMINE SULFATE AND AMPHETAMINE SULFATE 2.5; 2.5; 2.5; 2.5 MG/1; MG/1; MG/1; MG/1
10 TABLET ORAL 2 TIMES DAILY
Qty: 60 TABLET | Refills: 0 | Status: SHIPPED | OUTPATIENT
Start: 2021-08-18 | End: 2021-09-17

## 2021-09-14 ENCOUNTER — TELEPHONE (OUTPATIENT)
Dept: NURSING | Facility: CLINIC | Age: 41
End: 2021-09-14

## 2021-09-14 NOTE — TELEPHONE ENCOUNTER
"Data:   Pt returning a call from RN. Per chart review it appears MD was relaying the following message, \"Please review med list--refills for this med (adderall) were sent previously and should be on file at pharmacy.\" Pt declined COVID screen.        Action:   Message relayed to pt who will contact their pharmacy.      Response:   Pt verbalizes understanding and agrees with plan of care.    Connor Ashraf RN 9/14/2021 6:34 PM  [facility] Nurse Advisor    COVID 19 Nurse Triage Plan/Patient Instructions    Please be aware that novel coronavirus (COVID-19) may be circulating in the community. If you develop symptoms such as fever, cough, or SOB or if you have concerns about the presence of another infection including coronavirus (COVID-19), please contact your health care provider or visit [email or web address].     Disposition/Instructions    Additional COVID19 information to add for patients.   How can I protect others?  If you have symptoms (fever, cough, body aches or trouble breathing): Stay home and away from others (self-isolate) until:    At least 10 days have passed since your symptoms started, And     You ve had no fever--and no medicine that reduces fever--for 1 full day (24 hours), And      Your other symptoms have resolved (gotten better).     If you don t have symptoms, but a test showed that you have COVID-19 (you tested positive):    Stay home and away from others (self-isolate). Follow the tips under \"How do I self-isolate?\" below for 10 days (20 days if you have a weak immune system).    You don't need to be retested for COVID-19 before going back to school or work. As long as you're fever-free and feeling better, you can go back to school, work and other activities after waiting the 10 or 20 days.     How do I self-isolate?    Stay in your own room, even for meals. Use your own bathroom if you can.     Stay away from others in your home. No hugging, kissing or shaking hands. No " visitors.    Don t go to work, school or anywhere else.     Clean  high touch  surfaces often (doorknobs, counters, handles, etc.). Use a household cleaning spray or wipes. You ll find a full list on the EPA website:  www.epa.gov/pesticide-registration/list-n-disinfectants-use-against-sars-cov-2.    Cover your mouth and nose with a mask, tissue or washcloth to avoid spreading germs.    Wash your hands and face often. Use soap and water.    Caregivers in these groups are at risk for severe illness due to COVID-19:  o People 65 years and older  o People who live in a nursing home or long-term care facility  o People with chronic disease (lung, heart, cancer, diabetes, kidney, liver, immunologic)  o People who have a weakened immune system, including those who:  - Are in cancer treatment  - Take medicine that weakens the immune system, such as corticosteroids  - Had a bone marrow or organ transplant  - Have an immune deficiency  - Have poorly controlled HIV or AIDS  - Are obese (body mass index of 40 or higher)  - Smoke regularly    Caregivers should wear gloves while washing dishes, handling laundry and cleaning bedrooms and bathrooms.    Use caution when washing and drying laundry: Don t shake dirty laundry, and use the warmest water setting that you can.    For more tips, go to www.cdc.gov/coronavirus/2019-ncov/downloads/10Things.pdf.    How can I take care of myself?  1. Get lots of rest. Drink extra fluids (unless a doctor has told you not to).     2. Take Tylenol (acetaminophen) for fever or pain. If you have liver or kidney problems, ask your family doctor if it s okay to take Tylenol.     Adults can take either:     650 mg (two 325 mg pills) every 4 to 6 hours, or     1,000 mg (two 500 mg pills) every 8 hours as needed.     Note: Don t take more than 3,000 mg in one day.   Acetaminophen is found in many medicines (both prescribed and over-the-counter medicines). Read all labels to be sure you don t take too  much.     For children, check the Tylenol bottle for the right dose. The dose is based on the child s age or weight.    3. If you have other health problems (like cancer, heart failure, an organ transplant or severe kidney disease): Call your specialty clinic if you don t feel better in the next 2 days.    4. Know when to call 911: Emergency warning signs include:    Trouble breathing or shortness of breath    Pain or pressure in the chest that doesn t go away    Feeling confused like you haven t felt before, or not being able to wake up    Bluish-colored lips or face    What are the symptoms of COVID-19?     The most common symptoms are cough, fever and trouble breathing.     Less common symptoms include body aches, chills, diarrhea (loose, watery poops), fatigue (feeling very tired), headache, runny nose, sore throat and loss of smell.    COVID-19 can cause severe coughing (bronchitis) and lung infection (pneumonia).    How does it spread?     The virus may spread when a person coughs or sneezes into the air. The virus can travel about 6 feet this way, and it can live on surfaces.      Common  (household disinfectants) will kill the virus.    Who is at risk?  Anyone can catch COVID-19 if they re around someone who has the virus.    How can others protect themselves?     Stay away from people who have COVID-19 (or symptoms of COVID-19).    Wash hands often with soap and water. Or, use hand  with at least 60% alcohol.    Avoid touching the eyes, nose or mouth.     Wear a face mask when you go out in public, when sick or when caring for a sick person.    Where can I get more information?     [facility]: About COVID-19: [email or web address]    CDC: What to Do If You re Sick: www.cdc.gov/coronavirus/2019-ncov/about/steps-when-sick.html    CDC: Ending Home Isolation: www.cdc.gov/coronavirus/2019-ncov/hcp/disposition-in-home-patients.html     CDC: Caring for Someone:  www.cdc.gov/coronavirus/2019-ncov/if-you-are-sick/care-for-someone.html     [facility]: Interim Guidance for Hospital Discharge to Home: www.[facility]/diseases/coronavirus/hcp/hospdischarge.pdf    [facility] clinical trials (COVID-19 research studies): clinicalaffairs.[facility]-clinical-trials     Below are the COVID-19 hotlines at the [place] Department of Health ([facility]). Interpreters are available.   o For health questions: Call 389-837-7911 or 1-700.384.2253 (7 a.m. to 7 p.m.)  o For questions about schools and childcare: Call 085-766-9496 or 1-730.481.4363 (7 a.m. to 7 p.m.)          Thank you for taking steps to prevent the spread of this virus.  o Limit your contact with others.  o Wear a simple mask to cover your cough.  o Wash your hands well and often.    Resources    [facility]: About COVID-19: [email or web address]    CDC: What to Do If You're Sick: www.cdc.gov/coronavirus/2019-ncov/about/steps-when-sick.html    CDC: Ending Home Isolation: www.cdc.gov/coronavirus/2019-ncov/hcp/disposition-in-home-patients.html     CDC: Caring for Someone: www.cdc.gov/coronavirus/2019-ncov/if-you-are-sick/care-for-someone.html     [facility]: Interim Guidance for Hospital Discharge to Home: www.[facility]./diseases/coronavirus/hcp/hospdischarge.pdf    [facility] clinical trials (COVID-19 research studies): clinicalaffairs.[facility]-clinical-trials     Below are the COVID-19 hotlines at the [place] Department of Health ([facility]). Interpreters are available.   o For health questions: Call 442-773-1418 or 1-308.376.3560 (7 a.m. to 7 p.m.)  o For questions about schools and childcare: Call 028-699-6797 or 1-331.493.5619 (7 a.m. to 7 p.m.)

## 2021-10-03 ENCOUNTER — HEALTH MAINTENANCE LETTER (OUTPATIENT)
Age: 41
End: 2021-10-03

## 2021-11-09 DIAGNOSIS — F51.04 CHRONIC INSOMNIA: ICD-10-CM

## 2021-11-09 RX ORDER — ZOLPIDEM TARTRATE 10 MG/1
TABLET ORAL
Qty: 90 TABLET | Refills: 0 | Status: SHIPPED | OUTPATIENT
Start: 2021-11-09 | End: 2022-02-01

## 2021-11-09 NOTE — TELEPHONE ENCOUNTER
Routing refill request to provider for review/approval because:  Drug not on the [facility] refill protocol     Nicol Brunson RN   [facility]  -- Triage Nurse

## 2021-11-15 DIAGNOSIS — F90.0 ATTENTION DEFICIT HYPERACTIVITY DISORDER (ADHD), PREDOMINANTLY INATTENTIVE TYPE: Primary | ICD-10-CM

## 2021-11-15 RX ORDER — DEXTROAMPHETAMINE SACCHARATE, AMPHETAMINE ASPARTATE, DEXTROAMPHETAMINE SULFATE AND AMPHETAMINE SULFATE 2.5; 2.5; 2.5; 2.5 MG/1; MG/1; MG/1; MG/1
10 TABLET ORAL 2 TIMES DAILY
Qty: 60 TABLET | Refills: 0 | Status: SHIPPED | OUTPATIENT
Start: 2021-12-16 | End: 2022-01-15

## 2021-11-15 RX ORDER — DEXTROAMPHETAMINE SACCHARATE, AMPHETAMINE ASPARTATE, DEXTROAMPHETAMINE SULFATE AND AMPHETAMINE SULFATE 2.5; 2.5; 2.5; 2.5 MG/1; MG/1; MG/1; MG/1
10 TABLET ORAL 2 TIMES DAILY
Qty: 60 TABLET | Refills: 0 | Status: SHIPPED | OUTPATIENT
Start: 2021-11-15 | End: 2021-12-15

## 2021-11-15 RX ORDER — DEXTROAMPHETAMINE SACCHARATE, AMPHETAMINE ASPARTATE, DEXTROAMPHETAMINE SULFATE AND AMPHETAMINE SULFATE 2.5; 2.5; 2.5; 2.5 MG/1; MG/1; MG/1; MG/1
10 TABLET ORAL 2 TIMES DAILY
Qty: 60 TABLET | Refills: 0 | Status: SHIPPED | OUTPATIENT
Start: 2022-01-16 | End: 2022-02-15

## 2021-11-15 RX ORDER — DEXTROAMPHETAMINE SACCHARATE, AMPHETAMINE ASPARTATE, DEXTROAMPHETAMINE SULFATE AND AMPHETAMINE SULFATE 2.5; 2.5; 2.5; 2.5 MG/1; MG/1; MG/1; MG/1
TABLET ORAL
Qty: 60 TABLET | Refills: 0 | OUTPATIENT
Start: 2021-11-15

## 2021-12-17 NOTE — TELEPHONE ENCOUNTER
Routing refill request to provider for review/approval because:  Drug not on the [facility] refill protocol     Skylar Schwartz RN           Try generic allegra or claritin at night to see if we can make any improvement with drainage and stuffiness .     Use Ocean Nasal Mist (or similar nasal SALINE) 1 spray in each nostril at least twice a day until you feel better.  You cannot take too much of this.     For the spot on the R groin area use the over the counter hydrocortisone twice daily as needed until improved.   Stop the Neosporin.     Per your request , will see back in a ya. Medicare Wellness Visit and subsequent office visit    Call if problems though in the interim

## 2022-01-23 ENCOUNTER — HEALTH MAINTENANCE LETTER (OUTPATIENT)
Age: 42
End: 2022-01-23

## 2022-02-01 DIAGNOSIS — F51.04 CHRONIC INSOMNIA: ICD-10-CM

## 2022-02-01 RX ORDER — ZOLPIDEM TARTRATE 10 MG/1
TABLET ORAL
Qty: 90 TABLET | Refills: 0 | Status: SHIPPED | OUTPATIENT
Start: 2022-02-01 | End: 2022-08-01

## 2022-02-01 NOTE — TELEPHONE ENCOUNTER
Spoke with patient and he would like a refill sent in as soon as possible because he is out of the medication. He has a physical scheduled for March.

## 2022-02-01 NOTE — TELEPHONE ENCOUNTER
Routing refill request to provider for review/approval because:  Drug not on the [facility] refill protocol   Isrrael Milan RN, BSN

## 2022-02-17 DIAGNOSIS — F90.0 ATTENTION DEFICIT HYPERACTIVITY DISORDER (ADHD), PREDOMINANTLY INATTENTIVE TYPE: ICD-10-CM

## 2022-02-17 RX ORDER — DEXTROAMPHETAMINE SACCHARATE, AMPHETAMINE ASPARTATE, DEXTROAMPHETAMINE SULFATE AND AMPHETAMINE SULFATE 2.5; 2.5; 2.5; 2.5 MG/1; MG/1; MG/1; MG/1
10 TABLET ORAL 2 TIMES DAILY
Qty: 60 TABLET | Refills: 0 | Status: SHIPPED | OUTPATIENT
Start: 2022-02-17 | End: 2022-05-03

## 2022-02-17 NOTE — TELEPHONE ENCOUNTER
Routing refill request to provider for review/approval because:  Drug not on the [facility] refill protocol     Isabel Russo RN on 2/17/2022 at 11:41 AM

## 2022-03-17 DIAGNOSIS — N52.9 ERECTILE DYSFUNCTION, UNSPECIFIED ERECTILE DYSFUNCTION TYPE: ICD-10-CM

## 2022-03-18 RX ORDER — SILDENAFIL CITRATE 20 MG/1
TABLET ORAL
Qty: 40 TABLET | Refills: 3 | Status: SHIPPED | OUTPATIENT
Start: 2022-03-18 | End: 2022-06-03

## 2022-05-03 DIAGNOSIS — F90.0 ATTENTION DEFICIT HYPERACTIVITY DISORDER (ADHD), PREDOMINANTLY INATTENTIVE TYPE: ICD-10-CM

## 2022-05-03 RX ORDER — DEXTROAMPHETAMINE SACCHARATE, AMPHETAMINE ASPARTATE, DEXTROAMPHETAMINE SULFATE AND AMPHETAMINE SULFATE 2.5; 2.5; 2.5; 2.5 MG/1; MG/1; MG/1; MG/1
TABLET ORAL
Qty: 60 TABLET | Refills: 0 | Status: SHIPPED | OUTPATIENT
Start: 2022-05-03 | End: 2022-06-22

## 2022-05-03 NOTE — TELEPHONE ENCOUNTER
Routing refill request to provider for review/approval because:  Drug not on the [facility] refill protocol     Isabel Russo RN on 5/3/2022 at 12:20 PM

## 2022-05-11 ENCOUNTER — OFFICE VISIT (OUTPATIENT)
Dept: PEDIATRICS | Facility: CLINIC | Age: 42
End: 2022-05-11
Payer: COMMERCIAL

## 2022-05-11 VITALS
HEART RATE: 63 BPM | OXYGEN SATURATION: 98 % | DIASTOLIC BLOOD PRESSURE: 60 MMHG | TEMPERATURE: 98.5 F | SYSTOLIC BLOOD PRESSURE: 112 MMHG | BODY MASS INDEX: 23.04 KG/M2 | RESPIRATION RATE: 16 BRPM | HEIGHT: 68 IN | WEIGHT: 152 LBS

## 2022-05-11 DIAGNOSIS — F90.0 ATTENTION DEFICIT HYPERACTIVITY DISORDER (ADHD), PREDOMINANTLY INATTENTIVE TYPE: ICD-10-CM

## 2022-05-11 DIAGNOSIS — H91.92 CHANGE IN HEARING, LEFT: ICD-10-CM

## 2022-05-11 DIAGNOSIS — Z00.00 ROUTINE GENERAL MEDICAL EXAMINATION AT A HEALTH CARE FACILITY: Primary | ICD-10-CM

## 2022-05-11 DIAGNOSIS — F51.04 CHRONIC INSOMNIA: ICD-10-CM

## 2022-05-11 PROCEDURE — 99396 PREV VISIT EST AGE 40-64: CPT | Performed by: INTERNAL MEDICINE

## 2022-05-11 SDOH — ECONOMIC STABILITY: INCOME INSECURITY: HOW HARD IS IT FOR YOU TO PAY FOR THE VERY BASICS LIKE FOOD, HOUSING, MEDICAL CARE, AND HEATING?: NOT VERY HARD

## 2022-05-11 SDOH — ECONOMIC STABILITY: INCOME INSECURITY: IN THE LAST 12 MONTHS, WAS THERE A TIME WHEN YOU WERE NOT ABLE TO PAY THE MORTGAGE OR RENT ON TIME?: NO

## 2022-05-11 SDOH — HEALTH STABILITY: PHYSICAL HEALTH: ON AVERAGE, HOW MANY DAYS PER WEEK DO YOU ENGAGE IN MODERATE TO STRENUOUS EXERCISE (LIKE A BRISK WALK)?: 4 DAYS

## 2022-05-11 SDOH — ECONOMIC STABILITY: FOOD INSECURITY: WITHIN THE PAST 12 MONTHS, THE FOOD YOU BOUGHT JUST DIDN'T LAST AND YOU DIDN'T HAVE MONEY TO GET MORE.: NEVER TRUE

## 2022-05-11 SDOH — HEALTH STABILITY: PHYSICAL HEALTH: ON AVERAGE, HOW MANY MINUTES DO YOU ENGAGE IN EXERCISE AT THIS LEVEL?: 110 MIN

## 2022-05-11 SDOH — ECONOMIC STABILITY: FOOD INSECURITY: WITHIN THE PAST 12 MONTHS, YOU WORRIED THAT YOUR FOOD WOULD RUN OUT BEFORE YOU GOT MONEY TO BUY MORE.: NEVER TRUE

## 2022-05-11 ASSESSMENT — LIFESTYLE VARIABLES
SKIP TO QUESTIONS 9-10: 1
HOW OFTEN DO YOU HAVE A DRINK CONTAINING ALCOHOL: NEVER
AUDIT-C TOTAL SCORE: 0
HOW OFTEN DO YOU HAVE SIX OR MORE DRINKS ON ONE OCCASION: NEVER
HOW MANY STANDARD DRINKS CONTAINING ALCOHOL DO YOU HAVE ON A TYPICAL DAY: PATIENT DOES NOT DRINK

## 2022-05-11 ASSESSMENT — ENCOUNTER SYMPTOMS
NERVOUS/ANXIOUS: 0
FEVER: 0
PALPITATIONS: 0
FREQUENCY: 0
DIARRHEA: 0
HEARTBURN: 0
JOINT SWELLING: 0
MYALGIAS: 0
CHILLS: 0
HEMATURIA: 0
DIZZINESS: 0
HEMATOCHEZIA: 0
COUGH: 0
WEAKNESS: 0
SHORTNESS OF BREATH: 0
HEADACHES: 0
EYE PAIN: 0
ARTHRALGIAS: 0
NAUSEA: 0
PARESTHESIAS: 0
DYSURIA: 0
ABDOMINAL PAIN: 0
SORE THROAT: 0
CONSTIPATION: 0

## 2022-05-11 ASSESSMENT — SOCIAL DETERMINANTS OF HEALTH (SDOH)
DO YOU BELONG TO ANY CLUBS OR ORGANIZATIONS SUCH AS CHURCH GROUPS UNIONS, FRATERNAL OR ATHLETIC GROUPS, OR SCHOOL GROUPS?: YES
HOW OFTEN DO YOU ATTEND CHURCH OR RELIGIOUS SERVICES?: MORE THAN 4 TIMES PER YEAR
IN A TYPICAL WEEK, HOW MANY TIMES DO YOU TALK ON THE PHONE WITH FAMILY, FRIENDS, OR NEIGHBORS?: TWICE A WEEK
HOW OFTEN DO YOU GET TOGETHER WITH FRIENDS OR RELATIVES?: ONCE A WEEK

## 2022-05-11 ASSESSMENT — PAIN SCALES - GENERAL: PAINLEVEL: NO PAIN (0)
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
## 2022-06-01 DIAGNOSIS — N52.9 ERECTILE DYSFUNCTION, UNSPECIFIED ERECTILE DYSFUNCTION TYPE: ICD-10-CM

## 2022-06-03 RX ORDER — SILDENAFIL CITRATE 20 MG/1
TABLET ORAL
Qty: 40 TABLET | Refills: 6 | Status: SHIPPED | OUTPATIENT
Start: 2022-06-03 | End: 2023-01-25

## 2022-06-03 NOTE — TELEPHONE ENCOUNTER
Routing refill request to provider for review/approval because:  Drug interaction warning    Cody HERNANDEZ RN

## 2022-08-01 DIAGNOSIS — F51.04 CHRONIC INSOMNIA: ICD-10-CM

## 2022-08-01 RX ORDER — ZOLPIDEM TARTRATE 10 MG/1
TABLET ORAL
Qty: 90 TABLET | Refills: 0 | Status: SHIPPED | OUTPATIENT
Start: 2022-08-01 | End: 2022-10-28

## 2022-08-04 ENCOUNTER — TRANSFERRED RECORDS (OUTPATIENT)
Dept: PEDIATRICS | Facility: CLINIC | Age: 42
End: 2022-08-04

## 2022-09-07 ENCOUNTER — OFFICE VISIT (OUTPATIENT)
Dept: INTERNAL MEDICINE | Facility: CLINIC | Age: 42
End: 2022-09-07
Payer: COMMERCIAL

## 2022-09-07 VITALS
HEART RATE: 68 BPM | WEIGHT: 155.9 LBS | SYSTOLIC BLOOD PRESSURE: 118 MMHG | HEIGHT: 68 IN | RESPIRATION RATE: 16 BRPM | TEMPERATURE: 97.6 F | DIASTOLIC BLOOD PRESSURE: 70 MMHG | OXYGEN SATURATION: 100 % | BODY MASS INDEX: 23.63 KG/M2

## 2022-09-07 DIAGNOSIS — Z01.818 PRE-OP EXAM: Primary | ICD-10-CM

## 2022-09-07 DIAGNOSIS — J34.89 NASAL OBSTRUCTION: ICD-10-CM

## 2022-09-07 DIAGNOSIS — J34.2 DEVIATED NASAL SEPTUM: ICD-10-CM

## 2022-09-07 LAB
ANION GAP SERPL CALCULATED.3IONS-SCNC: 5 MMOL/L (ref 7–15)
BUN SERPL-MCNC: 18.8 MG/DL (ref 6–20)
CALCIUM SERPL-MCNC: 9.4 MG/DL (ref 8.6–10)
CHLORIDE SERPL-SCNC: 104 MMOL/L (ref 98–107)
CREAT SERPL-MCNC: 0.95 MG/DL (ref 0.67–1.17)
DEPRECATED HCO3 PLAS-SCNC: 32 MMOL/L (ref 22–29)
ERYTHROCYTE [DISTWIDTH] IN BLOOD BY AUTOMATED COUNT: 14.5 % (ref 10–15)
GFR SERPL CREATININE-BSD FRML MDRD: >90 ML/MIN/1.73M2
GLUCOSE SERPL-MCNC: 71 MG/DL (ref 70–99)
HCT VFR BLD AUTO: 45.3 % (ref 40–53)
HGB BLD-MCNC: 14.5 G/DL (ref 13.3–17.7)
MCH RBC QN AUTO: 25.6 PG (ref 26.5–33)
MCHC RBC AUTO-ENTMCNC: 32 G/DL (ref 31.5–36.5)
MCV RBC AUTO: 80 FL (ref 78–100)
PLATELET # BLD AUTO: 286 10E3/UL (ref 150–450)
POTASSIUM SERPL-SCNC: 4.8 MMOL/L (ref 3.4–5.3)
RBC # BLD AUTO: 5.66 10E6/UL (ref 4.4–5.9)
SODIUM SERPL-SCNC: 141 MMOL/L (ref 136–145)
WBC # BLD AUTO: 9.1 10E3/UL (ref 4–11)

## 2022-09-07 PROCEDURE — 99213 OFFICE O/P EST LOW 20 MIN: CPT | Performed by: FAMILY MEDICINE

## 2022-09-07 PROCEDURE — 36415 COLL VENOUS BLD VENIPUNCTURE: CPT | Performed by: FAMILY MEDICINE

## 2022-09-07 PROCEDURE — 80048 BASIC METABOLIC PNL TOTAL CA: CPT | Performed by: FAMILY MEDICINE

## 2022-09-07 PROCEDURE — 85027 COMPLETE CBC AUTOMATED: CPT | Performed by: FAMILY MEDICINE
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
## 2022-09-11 ENCOUNTER — HEALTH MAINTENANCE LETTER (OUTPATIENT)
Age: 42
End: 2022-09-11

## 2022-09-30 DIAGNOSIS — F90.0 ATTENTION DEFICIT HYPERACTIVITY DISORDER (ADHD), PREDOMINANTLY INATTENTIVE TYPE: ICD-10-CM

## 2022-10-02 RX ORDER — DEXTROAMPHETAMINE SACCHARATE, AMPHETAMINE ASPARTATE, DEXTROAMPHETAMINE SULFATE AND AMPHETAMINE SULFATE 2.5; 2.5; 2.5; 2.5 MG/1; MG/1; MG/1; MG/1
TABLET ORAL
Qty: 60 TABLET | Refills: 0 | Status: SHIPPED | OUTPATIENT
Start: 2022-10-02 | End: 2023-01-25

## 2022-10-28 ENCOUNTER — TRANSFERRED RECORDS (OUTPATIENT)
Dept: HEALTH INFORMATION MANAGEMENT | Facility: CLINIC | Age: 42
End: 2022-10-28

## 2022-10-28 DIAGNOSIS — F51.04 CHRONIC INSOMNIA: ICD-10-CM

## 2022-10-30 RX ORDER — ZOLPIDEM TARTRATE 10 MG/1
10 TABLET ORAL
Qty: 90 TABLET | Refills: 0 | Status: SHIPPED | OUTPATIENT
Start: 2022-10-30 | End: 2023-01-30

## 2022-11-21 ENCOUNTER — NURSE TRIAGE (OUTPATIENT)
Dept: NURSING | Facility: CLINIC | Age: 42
End: 2022-11-21

## 2022-11-21 NOTE — TELEPHONE ENCOUNTER
"Nurse Triage SBAR    Is this a 2nd Level Triage? YES, LICENSED PRACTITIONER REVIEW IS REQUIRED    Situation: Pt calling with cold like symptoms for the last 8-9 days. Symptoms started with a cough followed by chest congestion with wheezing, headache, low grade fevers, chills, sore throat, and fatigue     Background: No known exposure to anybody that has had the flu or covid recently. He has not tested himself for covid. Was taking sudafed but switched to mucinex which does seem to be helpful with moving the congestion around but he states \"there is just so much of it, I don't know where it is all coming from\". He has also used afrin which seems to have helped alittle as well     Assessment: Cough is productive with light yellow sputum and nasal discharge is white. Coughs to the point where it makes him gag. Chest is not painful but \"just really congested\". He does become easily fatigued with movement. Has not checked his temp today but has been afebrile the last couple times he checked     Protocol Recommended Disposition:   Discuss With PCP And Callback By Nurse Within 1 Hour    Recommendation: Advised that message would be sent to PCP to review for further advice on if he should be seen or any care advice he can try. Pt is agreeable and would also like to mention that he had pneumonia 1-2 years ago and current symptoms feel very similar to that except the chest congestion is worse      Routed to provider    Does the patient meet one of the following criteria for ADS visit consideration? 16+ years old, with an [facility] PCP     TIP  Providers, please consider if this condition is appropriate for management at one of our Acute and Diagnostic Services sites.     If patient is a good candidate, please use dotphrase <dot>triageresponse and select Refer to ADS to document.    Reason for Disposition    MILD difficulty breathing (e.g., minimal/no SOB at rest, SOB with walking, pulse <100)    Additional Information    " Negative: SEVERE difficulty breathing (e.g., struggling for each breath, speaks in single words)    Negative: Difficult to awaken or acting confused (e.g., disoriented, slurred speech)    Negative: Bluish (or gray) lips or face now    Negative: Shock suspected (e.g., cold/pale/clammy skin, too weak to stand, low BP, rapid pulse)    Negative: Sounds like a life-threatening emergency to the triager    Negative: [1] Diagnosed or suspected COVID-19 AND [2] symptoms lasting 3 or more weeks    Negative: [1] COVID-19 exposure AND [2] no symptoms    Negative: COVID-19 vaccine reaction suspected (e.g., fever, headache, muscle aches) occurring 1 to 3 days after getting vaccine    Negative: COVID-19 vaccine, questions about    Negative: [1] Lives with someone known to have influenza (flu test positive) AND [2] flu-like symptoms (e.g., cough, runny nose, sore throat, SOB; with or without fever)    Negative: [1] Adult with possible COVID-19 symptoms AND [2] triager concerned about severity of symptoms or other causes    Negative: COVID-19 and breastfeeding, questions about    Negative: SEVERE or constant chest pain or pressure  (Exception: Mild central chest pain, present only when coughing.)    Negative: MODERATE difficulty breathing (e.g., speaks in phrases, SOB even at rest, pulse 100-120)    Negative: Headache and stiff neck (can't touch chin to chest)    Negative: Oxygen level (e.g., pulse oximetry) 90 percent or lower    Negative: Chest pain or pressure  (Exception: MILD central chest pain, present only when coughing)    Negative: Patient sounds very sick or weak to the triager    Protocols used: CORONAVIRUS (COVID-19) DIAGNOSED OR [id number]      Jessica Gutierres RN [facility] November 21, 2022 1:07 PM

## 2022-11-21 NOTE — TELEPHONE ENCOUNTER
Appt made for tomorrow  Pt verbalized understanding and agrees to the plan    Fe Leung RN on 11/21/2022 at 1:31 PM

## 2022-11-22 ENCOUNTER — OFFICE VISIT (OUTPATIENT)
Dept: PEDIATRICS | Facility: CLINIC | Age: 42
End: 2022-11-22
Payer: COMMERCIAL

## 2022-11-22 VITALS
RESPIRATION RATE: 28 BRPM | HEIGHT: 68 IN | OXYGEN SATURATION: 99 % | TEMPERATURE: 98 F | HEART RATE: 66 BPM | WEIGHT: 155 LBS | BODY MASS INDEX: 23.49 KG/M2 | DIASTOLIC BLOOD PRESSURE: 68 MMHG | SYSTOLIC BLOOD PRESSURE: 112 MMHG

## 2022-11-22 DIAGNOSIS — J20.9 ACUTE BRONCHITIS WITH SYMPTOMS > 10 DAYS: ICD-10-CM

## 2022-11-22 DIAGNOSIS — J01.90 ACUTE SINUSITIS WITH SYMPTOMS > 10 DAYS: Primary | ICD-10-CM

## 2022-11-22 DIAGNOSIS — R06.2 WHEEZING: ICD-10-CM

## 2022-11-22 PROCEDURE — 99213 OFFICE O/P EST LOW 20 MIN: CPT | Performed by: NURSE PRACTITIONER

## 2022-11-22 RX ORDER — ALBUTEROL SULFATE 90 UG/1
2 AEROSOL, METERED RESPIRATORY (INHALATION) EVERY 6 HOURS
Qty: 18 G | Refills: 0 | Status: SHIPPED | OUTPATIENT
Start: 2022-11-22

## 2022-11-22 ASSESSMENT — PAIN SCALES - GENERAL: PAINLEVEL: MILD PAIN (2)

## 2022-11-22 NOTE — PROGRESS NOTES
Assessment & Plan     Acute sinusitis with symptoms > 10 days  Acute bronchitis with symptoms > 10 days  Wheezing  If not improving in 2-3 days then he should be seen again for consideration of chest xray vs steroids vs switch in medications, sooner if worsening. Reassuring no signs of resp distress or fever but we discussed s/s to watch for.  - albuterol (PROAIR HFA/PROVENTIL HFA/VENTOLIN HFA) 108 (90 Base) MCG/ACT inhaler; Inhale 2 puffs into the lungs every 6 hours  - amoxicillin-clavulanate (AUGMENTIN) 875-125 MG tablet; Take 1 tablet by mouth 2 times daily for 10 days         Patient Instructions   Use your albuterol inhaler scheduled every 4 -6 hours for the next 2-3 days, then use every 4-6 hours as needed for cough, wheeze, or shortness of breath.    Take antibiotic twice per day with food    For cough:  -delsym  -mucinex    Follow up Friday if symptoms not improving-sooner if worsening symptoms    Get flu shot when feeling better       Return in about 3 days (around 11/25/2022), or if symptoms worsen or fail to improve.    Alicia Moore NP  [facility] WILLIAMS Reynoso is a 42 year old, presenting for the following health issues:  URI    URI    History of Present Illness       Reason for visit:  Cough, congestion, headache, sore throat  Symptom onset:  1-2 weeks ago  Symptoms include:  Cough  congestion   sore throat  Symptom intensity:  Moderate  Symptom progression:  Improving  Had these symptoms before:  No    He eats 2-3 servings of fruits and vegetables daily.He consumes 0 sweetened beverage(s) daily.He exercises with enough effort to increase his heart rate 30 to 60 minutes per day.  He exercises with enough effort to increase his heart rate 4 days per week.   He is taking medications regularly.     Started as sore throat  Then progresses to severe congestion and cough  Temp 99F, some chills.     Dyspneic with activity  Occ wheeze, with cough.    No hx of asthma  No  "known illness exposures  Recent travel to [place]    Review of Systems         Objective    /68 (BP Location: Right arm, Patient Position: Chair, Cuff Size: Adult Regular)   Pulse 66   Temp 98  F (36.7  C) (Tympanic)   Resp 28   Ht 1.721 m (5' 7.75\")   Wt 70.3 kg (155 lb)   SpO2 99%   BMI 23.74 kg/m    Body mass index is 23.74 kg/m .  Physical Exam   GENERAL: healthy, alert and no distress  EYES: Eyes grossly normal to inspection  HENT: ear canals normal, B/l TMs with clear effusion, nose and mouth without ulcers or lesions  NECK: no adenopathy  RESP:  no rales or rhonchi. Slight exp wheeze that cleared with deep breathing to left lower posterior lung field. No respiratory distress. No tachypnea. Infrequent tight cough.  CV: regular rate and rhythm, normal S1 S2, no S3 or S4, no murmur, click or rub                      "

## 2022-11-22 NOTE — PATIENT INSTRUCTIONS
Use your albuterol inhaler scheduled every 4 -6 hours for the next 2-3 days, then use every 4-6 hours as needed for cough, wheeze, or shortness of breath.    Take antibiotic twice per day with food    For cough:  -delsym  -mucinex    Follow up Friday if symptoms not improving-sooner if worsening symptoms    Get flu shot when feeling better

## 2022-12-14 ENCOUNTER — VIRTUAL VISIT (OUTPATIENT)
Dept: FAMILY MEDICINE | Facility: CLINIC | Age: 42
End: 2022-12-14
Payer: COMMERCIAL

## 2022-12-14 DIAGNOSIS — R05.3 PERSISTENT COUGH: Primary | ICD-10-CM

## 2022-12-14 PROCEDURE — 99213 OFFICE O/P EST LOW 20 MIN: CPT | Mod: GT | Performed by: PHYSICIAN ASSISTANT

## 2022-12-14 RX ORDER — OXYCODONE HYDROCHLORIDE 5 MG/1
TABLET ORAL
COMMUNITY
Start: 2022-09-21 | End: 2023-07-06

## 2022-12-14 NOTE — PROGRESS NOTES
"Edgardo is a 42 year old who is being evaluated via a billable video visit.        Assessment & Plan   I have recommended pt be seen in a face to face visit for further evaluation given worsening sx, previus tx and recurrence of sx with mild sob, chest discomfort, feverish.  Minimum will need lung exam, O2 sats. Consider CXR to exclude pna.  Can not exclude this is a new illness.      Margy Serna PA-C      Subjective   Edgardo is a 42 year old, presenting for the following health issues:  Cough (Pt c/o cough,sinus pressure chills and body aches that have been getting worse x 2-3 days)      History of Present Illness       Headaches:   Since the patient's last clinic visit, headaches are: no change  The patient is getting headaches:  Headache all day  He is able to do normal daily activities when he has a migraine.  The patient is taking the following rescue/relief medications:  Ibuprofen (Advil, Motrin)   Patient states \"I get some relief\" from the rescue/relief medications.   The patient is taking the following medications to prevent migraines:  No medications to prevent migraines  In the past 4 weeks, the patient has gone to an Urgent Care or Emergency Room 0 times times due to headaches.    Reason for visit:  Severe chest congestion, chills, sore throat, coughing    He eats 2-3 servings of fruits and vegetables daily.He consumes 0 sweetened beverage(s) daily.He exercises with enough effort to increase his heart rate 60 or more minutes per day.  He exercises with enough effort to increase his heart rate 4 days per week.   He is taking medications regularly.     Throat felt 'burned\" followed by chest and sinus congestion, cough muscle aches and chills.  No fevers.  Nasal congestion, chest congestion,  Brain fog and chills.    Feverish at onset and then better and then worse again.    At onset of the illness did not do influenza or covid 19 test.    Daughter with similar sx.  Took Abx for sinusitis " (augmentin) , felt some better but not 100% then came back with avengeance.    HA.  NO facial pain or tooth pain.    NSAIDs helpful.    Ears full.  Discharge clear   Some chest pain.  Little short of breath.  Able to run and go to the gym.  Little wheezy.    Using inhaler : not a lot of help.        Review of Systems   Constitutional, HEENT, cardiovascular, pulmonary, gi and gu systems are negative, except as otherwise noted.      Objective       Vitals:  No vitals were obtained today due to virtual visit.    Physical Exam     Patient is alert and oriented. Able to speak in full sentences. No wheezing or cough heard during telephone conversation. Mood is appropriate.      Video-Visit Details    Video Start Time: 1220    Type of service:  Video Visit    Video End Time: 1242    Originating Location (pt. Location): home        Distant Location (provider location):  On-site    Platform used for Video Visit: Journeys

## 2023-01-30 DIAGNOSIS — F51.04 CHRONIC INSOMNIA: ICD-10-CM

## 2023-01-30 RX ORDER — ZOLPIDEM TARTRATE 10 MG/1
10 TABLET ORAL
Qty: 90 TABLET | Refills: 0 | Status: SHIPPED | OUTPATIENT
Start: 2023-01-30 | End: 2023-04-30

## 2023-03-15 ENCOUNTER — TELEPHONE (OUTPATIENT)
Dept: PEDIATRICS | Facility: CLINIC | Age: 43
End: 2023-03-15

## 2023-03-15 NOTE — TELEPHONE ENCOUNTER
Prior Authorization Retail Medication Request    Medication/Dose: Zolpidem  Covered alternative(s):  Insurance doesn't allow for daily use, please renew previous PA    Insurance Information  Insurance Name:  [employer] (previously PA'd under same plan but [place])  Insurance ID:  [id number]  Insurance phone number:    Pharmacy Information  Name:  [facility]   Phone:  [phone] -s. Tyrell, Pharmacy Technician/Liaison, Discharge Pharmacy [phone]

## 2023-03-17 NOTE — TELEPHONE ENCOUNTER
Central Prior Authorization Team   Phone: [phone]    PA Initiation    Medication: Zolpidem  Insurance Company: [employer]s - Phone [phone] Fax [phone]  Pharmacy Filling the Rx: [place], [place] - [address] [facility]  Filling Pharmacy Phone: [phone]  Filling Pharmacy Fax:    Start Date: 3/17/2023

## 2023-03-20 NOTE — TELEPHONE ENCOUNTER
Prior Authorization Approval    Authorization Effective Date: 2/15/2023  Authorization Expiration Date: 3/16/2024  Medication: Zolpidem  Approved Dose/Quantity:    Reference #:     Insurance Company: [employer]s - Phone [phone] Fax [phone]  Expected CoPay:       CoPay Card Available:      Foundation Assistance Needed:    Which Pharmacy is filling the prescription (Not needed for infusion/clinic administered): [facility] [place], [place] - [address] [facility]  Pharmacy Notified:    Patient Notified: Comment:  Pharmacy will notify patient

## 2023-04-20 ENCOUNTER — PATIENT OUTREACH (OUTPATIENT)
Dept: CARE COORDINATION | Facility: CLINIC | Age: 43
End: 2023-04-20
Payer: COMMERCIAL

## 2023-05-01 ENCOUNTER — TELEPHONE (OUTPATIENT)
Dept: PEDIATRICS | Facility: CLINIC | Age: 43
End: 2023-05-01
Payer: COMMERCIAL

## 2023-05-01 NOTE — TELEPHONE ENCOUNTER
Prior Authorization Retail Medication Request    Medication/Dose: Zolpidem 10mg  Covered alternative(s):  Need quantity limit PA    Insurance Information  Insurance Name:  [employer] ([employer])  Insurance ID:  [id number]  Insurance phone number:    Pharmacy Information  Name:  [facility]   Phone:  [phone] -sJeff Santillan, Pharmacy Technician/Liaison, Discharge Pharmacy [phone]

## 2023-05-01 NOTE — TELEPHONE ENCOUNTER
Central Prior Authorization Team  Phone: [phone]    PA Initiation    Medication: Zolpidem qty limits  Insurance Company: [employer]s - Phone [phone] Fax [phone]  Pharmacy Filling the Rx: [facility] [place], [place] - [address] [facility]  Filling Pharmacy Phone: [phone]  Filling Pharmacy Fax:    Start Date: 5/1/2023

## 2023-05-02 ENCOUNTER — TELEPHONE (OUTPATIENT)
Dept: PEDIATRICS | Facility: CLINIC | Age: 43
End: 2023-05-02
Payer: COMMERCIAL

## 2023-05-02 NOTE — TELEPHONE ENCOUNTER
Refill was already completed 4/30/23.    Spoke with pt to notify.    Leighann Salomon on 5/2/2023 at 5:40 PM

## 2023-05-02 NOTE — TELEPHONE ENCOUNTER
Prior Authorization Approval    Authorization Effective Date: 4/1/2023  Authorization Expiration Date: 4/30/2024  Medication: Zolpidem qty limits- APPROVED   Approved Dose/Quantity:   Reference #:     Insurance Company: [employer]s - Phone [phone] Fax [phone]  Expected CoPay:       CoPay Card Available:      Foundation Assistance Needed:    Which Pharmacy is filling the prescription (Not needed for infusion/clinic administered): [facility] [address] [facility]  Pharmacy Notified: Yes  Patient Notified:  **Instructed pharmacy to notify patient when script is ready to /ship.**

## 2023-05-02 NOTE — TELEPHONE ENCOUNTER
New Medication Request    Contacts       Type Contact Phone/Fax    05/02/2023 04:08 PM CDT Phone (Incoming) Edgardo Mustafa (Self) [phone] (H)          What medication are you requesting?: zolpedin    Reason for medication request: Ran out of medication    Have you taken this medication before?: Yes:     Controlled Substance Agreement on file:   CSA -- Patient Level:    CSA: None found at the patient level.         Patient offered an appointment? No    Preferred Pharmacy: [id number] - [place] - [address] AT [facility]  [address]  [facility] [address]  Phone: [phone] Fax: [phone]    [id number] - [place], [place] - [address] [place] [address] [address] AT [facility] [address]  [address] [place] [address] [address]  [facility] [address]  Phone: [phone] Fax: [phone]    [facility] [place]  [place] - [address] [facility]  [address] [facility]   [address]  [facility] [address]  Phone: [phone] Fax: [phone]    [place], [place] - [address] [facility]  [address] [facility] [address]  Phone: [phone] Fax: [phone]    [facility] [place] - [place], [place] - [address] AT [facility]  [address]  [address]  [place] [address]  Phone: [phone] Fax: [phone]    [id number] - [place], [place] - [address]  [address]  [place] [address]  Phone: [phone] Fax: [phone]    [id number] - [place], [place] - [address] [facility]  [address] [facility] [place] [address]  Phone: [phone] Fax: [phone]      Could we send this information to you in [employer] or would you prefer to receive a phone call?:   Patient would prefer a phone call   Okay to leave a detailed message?: Yes at Cell number on file:    Telephone Information:   Mobile [phone]

## 2023-07-05 SDOH — ECONOMIC STABILITY: FOOD INSECURITY: WITHIN THE PAST 12 MONTHS, YOU WORRIED THAT YOUR FOOD WOULD RUN OUT BEFORE YOU GOT MONEY TO BUY MORE.: NEVER TRUE

## 2023-07-05 SDOH — HEALTH STABILITY: PHYSICAL HEALTH: ON AVERAGE, HOW MANY MINUTES DO YOU ENGAGE IN EXERCISE AT THIS LEVEL?: 60 MIN

## 2023-07-05 SDOH — ECONOMIC STABILITY: INCOME INSECURITY: IN THE LAST 12 MONTHS, WAS THERE A TIME WHEN YOU WERE NOT ABLE TO PAY THE MORTGAGE OR RENT ON TIME?: NO

## 2023-07-05 SDOH — ECONOMIC STABILITY: FOOD INSECURITY: WITHIN THE PAST 12 MONTHS, THE FOOD YOU BOUGHT JUST DIDN'T LAST AND YOU DIDN'T HAVE MONEY TO GET MORE.: NEVER TRUE

## 2023-07-05 SDOH — ECONOMIC STABILITY: INCOME INSECURITY: HOW HARD IS IT FOR YOU TO PAY FOR THE VERY BASICS LIKE FOOD, HOUSING, MEDICAL CARE, AND HEATING?: NOT VERY HARD

## 2023-07-05 SDOH — HEALTH STABILITY: PHYSICAL HEALTH: ON AVERAGE, HOW MANY DAYS PER WEEK DO YOU ENGAGE IN MODERATE TO STRENUOUS EXERCISE (LIKE A BRISK WALK)?: 6 DAYS

## 2023-07-05 ASSESSMENT — ENCOUNTER SYMPTOMS
ARTHRALGIAS: 0
CONSTIPATION: 0
MYALGIAS: 0
WEAKNESS: 0
HEMATOCHEZIA: 0
JOINT SWELLING: 0
DIARRHEA: 0
DIZZINESS: 0
HEARTBURN: 0
FREQUENCY: 0
PALPITATIONS: 0
DYSURIA: 0
COUGH: 0
ABDOMINAL PAIN: 0
EYE PAIN: 0
FEVER: 0
NAUSEA: 0
SORE THROAT: 0
PARESTHESIAS: 0
SHORTNESS OF BREATH: 0
HEADACHES: 0
HEMATURIA: 0
NERVOUS/ANXIOUS: 0

## 2023-07-05 ASSESSMENT — LIFESTYLE VARIABLES
SKIP TO QUESTIONS 9-10: 1
HOW MANY STANDARD DRINKS CONTAINING ALCOHOL DO YOU HAVE ON A TYPICAL DAY: PATIENT DOES NOT DRINK
HOW OFTEN DO YOU HAVE SIX OR MORE DRINKS ON ONE OCCASION: NEVER
AUDIT-C TOTAL SCORE: 0
HOW OFTEN DO YOU HAVE A DRINK CONTAINING ALCOHOL: NEVER

## 2023-07-05 ASSESSMENT — SOCIAL DETERMINANTS OF HEALTH (SDOH)
HOW OFTEN DO YOU GET TOGETHER WITH FRIENDS OR RELATIVES?: TWICE A WEEK
IN A TYPICAL WEEK, HOW MANY TIMES DO YOU TALK ON THE PHONE WITH FAMILY, FRIENDS, OR NEIGHBORS?: MORE THAN THREE TIMES A WEEK
HOW OFTEN DO YOU ATTEND CHURCH OR RELIGIOUS SERVICES?: MORE THAN 4 TIMES PER YEAR
DO YOU BELONG TO ANY CLUBS OR ORGANIZATIONS SUCH AS CHURCH GROUPS UNIONS, FRATERNAL OR ATHLETIC GROUPS, OR SCHOOL GROUPS?: YES

## 2023-07-06 ENCOUNTER — OFFICE VISIT (OUTPATIENT)
Dept: PEDIATRICS | Facility: CLINIC | Age: 43
End: 2023-07-06
Payer: COMMERCIAL

## 2023-07-06 VITALS
BODY MASS INDEX: 23.57 KG/M2 | OXYGEN SATURATION: 98 % | DIASTOLIC BLOOD PRESSURE: 58 MMHG | HEIGHT: 68 IN | HEART RATE: 69 BPM | RESPIRATION RATE: 16 BRPM | SYSTOLIC BLOOD PRESSURE: 118 MMHG | TEMPERATURE: 98 F | WEIGHT: 155.5 LBS

## 2023-07-06 DIAGNOSIS — Z91.030 BEE STING ALLERGY: ICD-10-CM

## 2023-07-06 DIAGNOSIS — F90.0 ATTENTION DEFICIT HYPERACTIVITY DISORDER (ADHD), PREDOMINANTLY INATTENTIVE TYPE: ICD-10-CM

## 2023-07-06 DIAGNOSIS — Z00.00 ROUTINE GENERAL MEDICAL EXAMINATION AT A HEALTH CARE FACILITY: Primary | ICD-10-CM

## 2023-07-06 DIAGNOSIS — Z13.220 LIPID SCREENING: ICD-10-CM

## 2023-07-06 DIAGNOSIS — N52.9 ERECTILE DYSFUNCTION, UNSPECIFIED ERECTILE DYSFUNCTION TYPE: ICD-10-CM

## 2023-07-06 DIAGNOSIS — F51.04 CHRONIC INSOMNIA: ICD-10-CM

## 2023-07-06 PROCEDURE — 36415 COLL VENOUS BLD VENIPUNCTURE: CPT | Performed by: INTERNAL MEDICINE

## 2023-07-06 PROCEDURE — 99213 OFFICE O/P EST LOW 20 MIN: CPT | Mod: 25 | Performed by: INTERNAL MEDICINE

## 2023-07-06 PROCEDURE — 99396 PREV VISIT EST AGE 40-64: CPT | Performed by: INTERNAL MEDICINE

## 2023-07-06 PROCEDURE — 80061 LIPID PANEL: CPT | Performed by: INTERNAL MEDICINE

## 2023-07-06 RX ORDER — EPINEPHRINE 0.3 MG/.3ML
0.3 INJECTION SUBCUTANEOUS PRN
Qty: 2 EACH | Refills: 11 | Status: SHIPPED | OUTPATIENT
Start: 2023-07-06 | End: 2024-04-23

## 2023-07-06 RX ORDER — TADALAFIL 10 MG/1
10-20 TABLET ORAL DAILY PRN
Qty: 60 TABLET | Refills: 6 | Status: SHIPPED | OUTPATIENT
Start: 2023-07-06

## 2023-07-06 ASSESSMENT — ENCOUNTER SYMPTOMS
WEAKNESS: 0
NAUSEA: 0
PARESTHESIAS: 0
COUGH: 0
MYALGIAS: 0
HEARTBURN: 0
DIZZINESS: 0
SORE THROAT: 0
HEMATURIA: 0
DIARRHEA: 0
ABDOMINAL PAIN: 0
SHORTNESS OF BREATH: 0
HEADACHES: 0
FEVER: 0
JOINT SWELLING: 0
EYE PAIN: 0
NERVOUS/ANXIOUS: 0
DYSURIA: 0
HEMATOCHEZIA: 0
ARTHRALGIAS: 0
PALPITATIONS: 0
FREQUENCY: 0
CONSTIPATION: 0

## 2023-07-06 ASSESSMENT — PAIN SCALES - GENERAL: PAINLEVEL: NO PAIN (0)

## 2023-07-06 NOTE — PROGRESS NOTES
SUBJECTIVE:   CC: Edgardo is an 42 year old who presents for preventative health visit.       2023     9:22 AM   Additional Questions   Roomed by Judy   Accompanied by self         2023     9:22 AM   Patient Reported Additional Medications   Patient reports taking the following new medications none     Healthy Habits:     Getting at least 3 servings of Calcium per day:  Yes    Bi-annual eye exam:  Yes    Dental care twice a year:  Yes    Sleep apnea or symptoms of sleep apnea:  None    Diet:  Regular (no restrictions)    Frequency of exercise:  4-5 days/week    Duration of exercise:  45-60 minutes    Taking medications regularly:  Yes    Medication side effects:  Significant flushing    Additional concerns today:  No      Today's PHQ-2 Score:       2023     1:01 PM   PHQ-2 (  [employer])   Q1: Little interest or pleasure in doing things 0   Q2: Feeling down, depressed or hopeless 0   PHQ-2 Score 0   Q1: Little interest or pleasure in doing things Not at all   Q2: Feeling down, depressed or hopeless Not at all   PHQ-2 Score 0         Social History     Tobacco Use     Smoking status: Former     Packs/day: 1.00     Years: 10.00     Pack years: 10.00     Types: Cigarettes     Start date: 2000     Quit date: 2012     Years since quittin.5     Smokeless tobacco: Never   Substance Use Topics     Alcohol use: Not Currently     Comment: rare             2023     1:01 PM   Alcohol Use   Prescreen: >3 drinks/day or >7 drinks/week? No       Last PSA: No results found for: PSA    Reviewed orders with patient. Reviewed health maintenance and updated orders accordingly - Yes  Patient Active Problem List   Diagnosis     Generalized hyperhidrosis     Adie's pupil, left     Neuropathic pain of hand, left     Chronic insomnia     Mechanical low back pain     Attention deficit hyperactivity disorder (ADHD), predominantly inattentive type     Gastroesophageal reflux disease without esophagitis      Asymptomatic varicose veins of both lower extremities     Past Surgical History:   Procedure Laterality Date     ENT SURGERY  3/1/23     SURGICAL PATHOLOGY EXAM      repair deviated septum       Social History     Tobacco Use     Smoking status: Former     Packs/day: 1.00     Years: 10.00     Pack years: 10.00     Types: Cigarettes     Start date: 2000     Quit date: 2012     Years since quittin.5     Smokeless tobacco: Never   Substance Use Topics     Alcohol use: Not Currently     Comment: rare     Family History   Problem Relation Age of Onset     Hypertension Mother      Diabetes No family hx of      Coronary Artery Disease No family hx of      Cancer - colorectal No family hx of      Prostate Cancer No family hx of          Current Outpatient Medications   Medication Sig Dispense Refill     albuterol (PROAIR HFA/PROVENTIL HFA/VENTOLIN HFA) 108 (90 Base) MCG/ACT inhaler Inhale 2 puffs into the lungs every 6 hours 18 g 0     amphetamine-dextroamphetamine (ADDERALL) 10 MG tablet Take 1 tablet (10 mg) by mouth 2 times daily 60 tablet 0     brimonidine (ALPHAGAN) 0.2 % ophthalmic solution        EPINEPHrine (ANY BX GENERIC EQUIV) 0.3 MG/0.3ML injection 2-pack Inject 0.3 mLs (0.3 mg) into the muscle as needed for anaphylaxis May repeat one time in 5-15 minutes if response to initial dose is inadequate. 2 each 11     ketoconazole (NIZORAL) 2 % external shampoo Apply topically daily Apply to rash daily for 5-10min then wash off 100 mL 3     pilocarpine (PILOCAR) 1 % ophthalmic solution        tadalafil (CIALIS) 10 MG tablet Take 1-2 tablets (10-20 mg) by mouth daily as needed (ED) 60 tablet 6     zolpidem (AMBIEN) 10 MG tablet Take 1 tablet (10 mg) by mouth nightly as needed for sleep 90 tablet 0       Reviewed and updated as needed this visit by clinical staff   Tobacco  Allergies  Meds              Reviewed and updated as needed this visit by Provider                     Review of Systems  "  Constitutional: Negative for fever.   HENT: Negative for congestion, ear pain, hearing loss and sore throat.    Eyes: Negative for pain and visual disturbance.   Respiratory: Negative for cough and shortness of breath.    Cardiovascular: Negative for chest pain, palpitations and peripheral edema.   Gastrointestinal: Negative for abdominal pain, constipation, diarrhea, heartburn, hematochezia and nausea.   Genitourinary: Positive for impotence. Negative for dysuria, frequency, genital sores, hematuria, penile discharge and urgency.   Musculoskeletal: Negative for arthralgias, joint swelling and myalgias.   Skin: Negative for rash.   Neurological: Negative for dizziness, weakness, headaches and paresthesias.   Psychiatric/Behavioral: Negative for mood changes. The patient is not nervous/anxious.        OBJECTIVE:   /58 (BP Location: Right arm, Patient Position: Sitting, Cuff Size: Adult Regular)   Pulse 69   Temp 98  F (36.7  C) (Tympanic)   Resp 16   Ht 1.715 m (5' 7.5\")   Wt 70.5 kg (155 lb 8 oz)   SpO2 98%   BMI 24.00 kg/m      Physical Exam  GENERAL: healthy, alert and no distress  EYES: Eyes grossly normal to inspection, PERRL and conjunctivae and sclerae normal  HENT: ear canals and TM's normal, nose and mouth without ulcers or lesions  NECK: no adenopathy, no asymmetry, masses, or scars and thyroid normal to palpation  RESP: lungs clear to auscultation - no rales, rhonchi or wheezes  CV: regular rate and rhythm, normal S1 S2, no S3 or S4, no murmur, click or rub, no peripheral edema and peripheral pulses strong  ABDOMEN: soft, nontender, no hepatosplenomegaly, no masses and bowel sounds normal  MS: no gross musculoskeletal defects noted, no edema  SKIN: no suspicious lesions or rashes  NEURO: Normal strength and tone, mentation intact and speech normal  PSYCH: mentation appears normal, affect normal/bright    ASSESSMENT/PLAN:   (Z00.00) Routine general medical examination at [facility] facility  (primary encounter diagnosis)    (F90.0) Attention deficit hyperactivity disorder (ADHD), predominantly inattentive type  Comment:   Plan: plans to reduce adderall dose to 10mg daily, well controlled    (F51.04) Chronic insomnia  Comment:   Plan: long term ambien use (has been unable to taper)/denies side effects.    (N52.9) Erectile dysfunction, unspecified erectile dysfunction type  Comment:   Plan: tadalafil (CIALIS) 10 MG tablet          (Z91.030) Bee sting allergy  Comment:   Plan: EPINEPHrine (ANY BX GENERIC EQUIV) 0.3 MG/0.3ML        injection 2-pack          (Z13.220) Lipid screening  Comment:   Plan: Lipid panel reflex to direct LDL Fasting              Patient has been advised of split billing requirements and indicates understanding: Yes      COUNSELING:   Reviewed preventive health counseling, as reflected in patient instructions       Regular exercise       Healthy diet/nutrition       Colorectal cancer screening       Prostate cancer screening        He reports that he quit smoking about 11 years ago. His smoking use included cigarettes. He started smoking about 23 years ago. He has a 10.00 pack-year smoking history. He has never used smokeless tobacco.            Mesfin Rosales MD  [facility]

## 2023-07-06 NOTE — PATIENT INSTRUCTIONS
"  Tadalafil (Cialis) is often preferred because the medication effects persist for 2-3 days after each dose.  Both medications are typically not covered by insurance--you can expect to pay out-of-pocket.  Recommend the \"GoodRx\" cell phone harvey or website to assist with out-of-pocket cost.  The harvey allows you to select the local pharmacy where you can find the lowest price out of pocket when the medication is not covered by insurance.       Preventive Health Recommendations  Male Ages 40 to 49    Yearly exam:             See your health care provider every year in order to  o   Review health changes.   o   Discuss preventive care.    o   Review your medicines if your doctor has prescribed any.  You should be tested each year for STDs (sexually transmitted diseases) if you re at risk.   Have a cholesterol test every 5 years.   Have a colonoscopy (test for colon cancer) if someone in your family has had colon cancer or polyps before age 50.   After age 45, have a diabetes test (fasting glucose). If you are at risk for diabetes, you should have this test every 3 years.    Talk with your health care provider about whether or not a prostate cancer screening test (PSA) is right for you.    Shots: Get a flu shot each year. Get a tetanus shot every 10 years.     Nutrition:  Eat at least 5 servings of fruits and vegetables daily.   Eat whole-grain bread, whole-wheat pasta and brown rice instead of white grains and rice.   Get adequate Calcium and Vitamin D.     Lifestyle  Exercise for at least 150 minutes a week (30 minutes a day, 5 days a week). This will help you control your weight and prevent disease.   Limit alcohol to one drink per day.   No smoking.   Wear sunscreen to prevent skin cancer.   See your dentist every six months for an exam and cleaning.      "

## 2023-07-07 LAB
CHOLEST SERPL-MCNC: 202 MG/DL
HDLC SERPL-MCNC: 69 MG/DL
LDLC SERPL CALC-MCNC: 126 MG/DL
NONHDLC SERPL-MCNC: 133 MG/DL
TRIGL SERPL-MCNC: 37 MG/DL

## 2023-07-26 ENCOUNTER — TELEPHONE (OUTPATIENT)
Dept: PEDIATRICS | Facility: CLINIC | Age: 43
End: 2023-07-26
Payer: COMMERCIAL

## 2023-07-26 DIAGNOSIS — F51.04 CHRONIC INSOMNIA: ICD-10-CM

## 2023-07-26 RX ORDER — ZOLPIDEM TARTRATE 10 MG/1
10 TABLET ORAL
Qty: 90 TABLET | Refills: 3 | Status: SHIPPED | OUTPATIENT
Start: 2023-07-26 | End: 2024-02-17

## 2023-07-26 NOTE — TELEPHONE ENCOUNTER
"Pt is out of refills on zolpidem (AMBIEN) 10 MG tablet. The last time he seen you was 7/6/23 pt states that \"I usually get a refill when I come in to see Dr. Rosales, I'm not sure why we did not discuss it this time.\" Pt would like a refill on this medication.    ETELVINA CHRISTOPHER on 7/26/2023 at 8:25 AM    "

## 2024-02-17 DIAGNOSIS — F51.04 CHRONIC INSOMNIA: ICD-10-CM

## 2024-02-19 RX ORDER — ZOLPIDEM TARTRATE 10 MG/1
10 TABLET ORAL
Qty: 90 TABLET | Refills: 1 | Status: SHIPPED | OUTPATIENT
Start: 2024-02-19

## 2024-04-23 PROBLEM — Z91.030 BEE STING ALLERGY: Status: ACTIVE | Noted: 2024-04-23

## 2024-06-06 ENCOUNTER — PATIENT OUTREACH (OUTPATIENT)
Dept: CARE COORDINATION | Facility: CLINIC | Age: 44
End: 2024-06-06
Payer: COMMERCIAL

## 2024-06-20 ENCOUNTER — PATIENT OUTREACH (OUTPATIENT)
Dept: CARE COORDINATION | Facility: CLINIC | Age: 44
End: 2024-06-20
Payer: COMMERCIAL

## 2024-09-15 ENCOUNTER — HEALTH MAINTENANCE LETTER (OUTPATIENT)
Age: 44
End: 2024-09-15